# Patient Record
Sex: MALE | Race: WHITE | Employment: OTHER | URBAN - NONMETROPOLITAN AREA
[De-identification: names, ages, dates, MRNs, and addresses within clinical notes are randomized per-mention and may not be internally consistent; named-entity substitution may affect disease eponyms.]

---

## 2017-01-09 ENCOUNTER — AMBULATORY - GICH (OUTPATIENT)
Dept: UROLOGY | Facility: OTHER | Age: 82
End: 2017-01-09

## 2017-01-09 DIAGNOSIS — C61 MALIGNANT NEOPLASM OF PROSTATE (H): ICD-10-CM

## 2017-01-18 ENCOUNTER — COMMUNICATION - GICH (OUTPATIENT)
Dept: UROLOGY | Facility: OTHER | Age: 82
End: 2017-01-18

## 2018-01-03 NOTE — TELEPHONE ENCOUNTER
Patient Information     Patient Name MRN Maxime Lowry [id number] Male 10/26/1933      Telephone Encounter by Sima Santos at 2017 10:36 AM     Author:  Sima Santos Service:  (none) Author Type:  (none)     Filed:  2017 10:37 AM Encounter Date:  2017 Status:  Signed     :  Sima Santos            Pt was called by Urology Liaison to schedule a 6 month follow up with and PSA and see Dr. Norman. Pt declined the appointment stated he is seeing Dr. Pathak for follow up. Dr. Norman notified.Sima Santos LPN  2017  10:37 AM

## 2018-01-31 ENCOUNTER — DOCUMENTATION ONLY (OUTPATIENT)
Dept: FAMILY MEDICINE | Facility: OTHER | Age: 83
End: 2018-01-31

## 2018-01-31 PROBLEM — R35.0 URINARY FREQUENCY: Status: ACTIVE | Noted: 2018-01-31

## 2018-01-31 PROBLEM — R97.20 ELEVATED PROSTATE SPECIFIC ANTIGEN (PSA): Status: ACTIVE | Noted: 2018-01-31

## 2018-01-31 PROBLEM — G43.909 MIGRAINE HEADACHE: Status: ACTIVE | Noted: 2018-01-31

## 2018-01-31 PROBLEM — R35.1 NOCTURIA: Status: ACTIVE | Noted: 2018-01-31

## 2018-01-31 PROBLEM — M70.20 OLECRANON BURSITIS: Status: ACTIVE | Noted: 2018-01-31

## 2018-01-31 PROBLEM — R19.8 OTHER SYMPTOMS INVOLVING DIGESTIVE SYSTEM(787.99): Status: ACTIVE | Noted: 2018-01-31

## 2018-01-31 PROBLEM — R05.9 COUGH: Status: ACTIVE | Noted: 2018-01-31

## 2018-01-31 PROBLEM — M54.50 LUMBAGO: Status: ACTIVE | Noted: 2018-01-31

## 2018-01-31 PROBLEM — I25.10 CORONARY ATHEROSCLEROSIS: Status: ACTIVE | Noted: 2018-01-31

## 2018-01-31 PROBLEM — M25.519 PAIN IN JOINT, SHOULDER REGION: Status: ACTIVE | Noted: 2018-01-31

## 2018-01-31 RX ORDER — GLIPIZIDE 5 MG/1
5 TABLET ORAL
COMMUNITY
Start: 2014-10-02 | End: 2019-03-27

## 2018-01-31 RX ORDER — LISINOPRIL 20 MG/1
10 TABLET ORAL EVERY MORNING
COMMUNITY
Start: 2013-08-27

## 2018-01-31 RX ORDER — MULTIVIT-MIN/IRON FUM/FOLIC AC 7.5 MG-4
1 TABLET ORAL DAILY
COMMUNITY
Start: 2014-10-02 | End: 2019-03-27

## 2018-01-31 RX ORDER — TAMSULOSIN HYDROCHLORIDE 0.4 MG/1
0.8 CAPSULE ORAL AT BEDTIME
Status: ON HOLD | COMMUNITY
Start: 2014-10-02 | End: 2019-09-22

## 2018-01-31 RX ORDER — HYDROXYCHLOROQUINE SULFATE 200 MG/1
200 TABLET, FILM COATED ORAL EVERY MORNING
Status: ON HOLD | COMMUNITY
Start: 2013-08-27 | End: 2019-03-30

## 2018-01-31 RX ORDER — SIMVASTATIN 40 MG
20 TABLET ORAL AT BEDTIME
COMMUNITY
Start: 2013-08-27 | End: 2019-03-27

## 2018-01-31 RX ORDER — PREDNISONE 5 MG/1
7.5 TABLET ORAL
COMMUNITY
Start: 2015-07-30

## 2018-01-31 RX ORDER — ATENOLOL 50 MG/1
50 TABLET ORAL DAILY
COMMUNITY
Start: 2013-08-27 | End: 2019-03-27

## 2018-08-28 ENCOUNTER — RECORDS - HEALTHEAST (OUTPATIENT)
Dept: LAB | Facility: CLINIC | Age: 83
End: 2018-08-28

## 2018-08-28 LAB
ALBUMIN UR-MCNC: ABNORMAL MG/DL
APPEARANCE UR: ABNORMAL
BACTERIA #/AREA URNS HPF: ABNORMAL HPF
BILIRUB UR QL STRIP: NEGATIVE
COLOR UR AUTO: YELLOW
GLUCOSE UR STRIP-MCNC: NEGATIVE MG/DL
HGB UR QL STRIP: NEGATIVE
KETONES UR STRIP-MCNC: NEGATIVE MG/DL
LEUKOCYTE ESTERASE UR QL STRIP: ABNORMAL
NITRATE UR QL: NEGATIVE
PH UR STRIP: 6.5 [PH] (ref 4.5–8)
RBC #/AREA URNS AUTO: ABNORMAL HPF
SP GR UR STRIP: 1.02 (ref 1–1.03)
SQUAMOUS #/AREA URNS AUTO: ABNORMAL LPF
UROBILINOGEN UR STRIP-ACNC: ABNORMAL
WBC #/AREA URNS AUTO: >100 HPF
WBC CLUMPS #/AREA URNS HPF: PRESENT /[HPF]

## 2018-08-29 LAB — BACTERIA SPEC CULT: ABNORMAL

## 2018-09-11 ENCOUNTER — RECORDS - HEALTHEAST (OUTPATIENT)
Dept: LAB | Facility: CLINIC | Age: 83
End: 2018-09-11

## 2018-09-11 LAB
ALBUMIN SERPL-MCNC: 3 G/DL (ref 3.5–5)
ALP SERPL-CCNC: 51 U/L (ref 45–120)
ALT SERPL W P-5'-P-CCNC: 15 U/L (ref 0–45)
ANION GAP SERPL CALCULATED.3IONS-SCNC: 9 MMOL/L (ref 5–18)
AST SERPL W P-5'-P-CCNC: 16 U/L (ref 0–40)
BILIRUB SERPL-MCNC: 0.4 MG/DL (ref 0–1)
BUN SERPL-MCNC: 24 MG/DL (ref 8–28)
CALCIUM SERPL-MCNC: 9.3 MG/DL (ref 8.5–10.5)
CHLORIDE BLD-SCNC: 107 MMOL/L (ref 98–107)
CHOLEST SERPL-MCNC: 129 MG/DL
CO2 SERPL-SCNC: 24 MMOL/L (ref 22–31)
CREAT SERPL-MCNC: 0.95 MG/DL (ref 0.7–1.3)
ERYTHROCYTE [DISTWIDTH] IN BLOOD BY AUTOMATED COUNT: 14.7 % (ref 11–14.5)
FASTING STATUS PATIENT QL REPORTED: NORMAL
GFR SERPL CREATININE-BSD FRML MDRD: >60 ML/MIN/1.73M2
GLUCOSE BLD-MCNC: 168 MG/DL (ref 70–125)
HCT VFR BLD AUTO: 33.6 % (ref 40–54)
HDLC SERPL-MCNC: 50 MG/DL
HGB BLD-MCNC: 11.2 G/DL (ref 14–18)
LDLC SERPL CALC-MCNC: 61 MG/DL
MCH RBC QN AUTO: 33 PG (ref 27–34)
MCHC RBC AUTO-ENTMCNC: 33.3 G/DL (ref 32–36)
MCV RBC AUTO: 99 FL (ref 80–100)
PLATELET # BLD AUTO: 167 THOU/UL (ref 140–440)
PMV BLD AUTO: 10.2 FL (ref 8.5–12.5)
POTASSIUM BLD-SCNC: 4.1 MMOL/L (ref 3.5–5)
PROT SERPL-MCNC: 6 G/DL (ref 6–8)
RBC # BLD AUTO: 3.39 MILL/UL (ref 4.4–6.2)
SODIUM SERPL-SCNC: 140 MMOL/L (ref 136–145)
TRIGL SERPL-MCNC: 88 MG/DL
WBC: 8.8 THOU/UL (ref 4–11)

## 2018-09-12 LAB — HBA1C MFR BLD: 6.4 % (ref 4.2–6.1)

## 2018-10-02 ENCOUNTER — HOSPITAL ENCOUNTER (EMERGENCY)
Facility: CLINIC | Age: 83
Discharge: HOME | End: 2018-10-02
Attending: FAMILY MEDICINE | Admitting: FAMILY MEDICINE
Payer: MEDICARE

## 2018-10-02 VITALS
OXYGEN SATURATION: 98 % | DIASTOLIC BLOOD PRESSURE: 93 MMHG | RESPIRATION RATE: 16 BRPM | TEMPERATURE: 97.6 F | SYSTOLIC BLOOD PRESSURE: 147 MMHG

## 2018-10-02 DIAGNOSIS — N39.0 URINARY TRACT INFECTION ASSOCIATED WITH CATHETERIZATION OF URINARY TRACT, UNSPECIFIED INDWELLING URINARY CATHETER TYPE, INITIAL ENCOUNTER (H): ICD-10-CM

## 2018-10-02 DIAGNOSIS — R33.9 URINARY RETENTION: ICD-10-CM

## 2018-10-02 DIAGNOSIS — T83.511A URINARY TRACT INFECTION ASSOCIATED WITH CATHETERIZATION OF URINARY TRACT, UNSPECIFIED INDWELLING URINARY CATHETER TYPE, INITIAL ENCOUNTER (H): ICD-10-CM

## 2018-10-02 LAB
ALBUMIN UR-MCNC: 30 MG/DL
ANION GAP SERPL CALCULATED.3IONS-SCNC: 6 MMOL/L (ref 3–14)
APPEARANCE UR: ABNORMAL
BILIRUB UR QL STRIP: NEGATIVE
BUN SERPL-MCNC: 22 MG/DL (ref 7–30)
CALCIUM SERPL-MCNC: 8.2 MG/DL (ref 8.5–10.1)
CHLORIDE SERPL-SCNC: 108 MMOL/L (ref 94–109)
CO2 SERPL-SCNC: 28 MMOL/L (ref 20–32)
COLOR UR AUTO: YELLOW
CREAT SERPL-MCNC: 1 MG/DL (ref 0.66–1.25)
GFR SERPL CREATININE-BSD FRML MDRD: 71 ML/MIN/1.7M2
GLUCOSE SERPL-MCNC: 149 MG/DL (ref 70–99)
GLUCOSE UR STRIP-MCNC: NEGATIVE MG/DL
HGB UR QL STRIP: NEGATIVE
KETONES UR STRIP-MCNC: NEGATIVE MG/DL
LEUKOCYTE ESTERASE UR QL STRIP: ABNORMAL
NITRATE UR QL: NEGATIVE
PH UR STRIP: 6 PH (ref 5–7)
POTASSIUM SERPL-SCNC: 4.1 MMOL/L (ref 3.4–5.3)
RBC #/AREA URNS AUTO: 28 /HPF (ref 0–2)
SODIUM SERPL-SCNC: 142 MMOL/L (ref 133–144)
SOURCE: ABNORMAL
SP GR UR STRIP: 1.01 (ref 1–1.03)
UROBILINOGEN UR STRIP-MCNC: 0 MG/DL (ref 0–2)
WBC #/AREA URNS AUTO: >182 /HPF (ref 0–5)
WBC CLUMPS #/AREA URNS HPF: PRESENT /HPF

## 2018-10-02 PROCEDURE — 99284 EMERGENCY DEPT VISIT MOD MDM: CPT | Mod: 25 | Performed by: FAMILY MEDICINE

## 2018-10-02 PROCEDURE — 76705 ECHO EXAM OF ABDOMEN: CPT | Mod: 26 | Performed by: FAMILY MEDICINE

## 2018-10-02 PROCEDURE — 87088 URINE BACTERIA CULTURE: CPT | Performed by: FAMILY MEDICINE

## 2018-10-02 PROCEDURE — 87186 SC STD MICRODIL/AGAR DIL: CPT | Performed by: FAMILY MEDICINE

## 2018-10-02 PROCEDURE — 87086 URINE CULTURE/COLONY COUNT: CPT | Performed by: FAMILY MEDICINE

## 2018-10-02 PROCEDURE — 25000125 ZZHC RX 250: Performed by: FAMILY MEDICINE

## 2018-10-02 PROCEDURE — 80048 BASIC METABOLIC PNL TOTAL CA: CPT | Performed by: FAMILY MEDICINE

## 2018-10-02 PROCEDURE — 76705 ECHO EXAM OF ABDOMEN: CPT | Performed by: FAMILY MEDICINE

## 2018-10-02 PROCEDURE — 81001 URINALYSIS AUTO W/SCOPE: CPT | Performed by: FAMILY MEDICINE

## 2018-10-02 RX ORDER — SULFAMETHOXAZOLE/TRIMETHOPRIM 800-160 MG
1 TABLET ORAL 2 TIMES DAILY
Qty: 14 TABLET | Refills: 0 | Status: SHIPPED | OUTPATIENT
Start: 2018-10-02 | End: 2018-10-13

## 2018-10-02 RX ADMIN — LIDOCAINE HYDROCHLORIDE 10 ML: 20 JELLY TOPICAL at 15:32

## 2018-10-02 ASSESSMENT — ENCOUNTER SYMPTOMS
ABDOMINAL PAIN: 1
NAUSEA: 0
DIAPHORESIS: 0
BLOOD IN STOOL: 0
DIFFICULTY URINATING: 1
FEVER: 0
SINUS PRESSURE: 0
COUGH: 0
FREQUENCY: 0
WHEEZING: 0
PALPITATIONS: 0
VOMITING: 0
SHORTNESS OF BREATH: 0
DYSURIA: 1
HEADACHES: 0
DIARRHEA: 1
CONSTIPATION: 0
SORE THROAT: 0
CHILLS: 0

## 2018-10-02 NOTE — ED NOTES
Pt self cath's and reports it went normally this morning but this afternoon he tried to cath but it was very painful and he was unable to perform the procedure. He also reports that some urine leaked around the catheter rather than coming through it. Pt denies hematuria. Does have hx of UTI but reports this feels different

## 2018-10-02 NOTE — ED AVS SNAPSHOT
[facility] Emergency Department    [address] [address]    Phone:  [phone]    Fax:  [phone]                                       Maxime Powell   MRN: [id number]    Department:  [facility] Emergency Department   Date of Visit:  10/2/2018           After Visit Summary Signature Page     I have received my discharge instructions, and my questions have been answered. I have discussed any challenges I see with this plan with the nurse or doctor.    ..........................................................................................................................................  Patient/Patient Representative Signature      ..........................................................................................................................................  Patient Representative Print Name and Relationship to Patient    ..................................................               ................................................  Date                                   Time    ..........................................................................................................................................  Reviewed by Signature/Title    ...................................................              ..............................................  Date                                               Time          22EPIC Rev 08/18

## 2018-10-02 NOTE — ED PROVIDER NOTES
History     Chief Complaint   Patient presents with     Urinary Retention     does self cathing but too painful this afternoon to insert cath     HPI  Maxime Powell is a 84 year old male who presents with DM,RA, prostate cancer, CAD and urinary retention and self-catheterizes 5-6 times daily for the last year. Today it was difficult and could not place catheter - painful to catheterize today and leakage around catheter. this occurred this afternoon.   dysuria.    No fever,.  suprapubic paiun. Tolerating fluids, food.     Problem List:    Patient Active Problem List    Diagnosis Date Noted     Coronary atherosclerosis 01/31/2018     Priority: Medium     Cough 01/31/2018     Priority: Medium     Elevated prostate specific antigen (PSA) 01/31/2018     Priority: Medium     Lumbago 01/31/2018     Priority: Medium     Migraine headache 01/31/2018     Priority: Medium     Nocturia 01/31/2018     Priority: Medium     Olecranon bursitis 01/31/2018     Priority: Medium     Other symptoms involving digestive system(787.99) 01/31/2018     Priority: Medium     Pain in joint, shoulder region 01/31/2018     Priority: Medium     Urinary frequency 01/31/2018     Priority: Medium     Aftercare following surgery of the musculoskeletal system 11/04/2014     Priority: Medium     Acute urinary retention 05/07/2014     Priority: Medium     ASCVD (arteriosclerotic cardiovascular disease) 05/07/2014     Priority: Medium     Overview:   S/p RCA stenting 2001       Diabetes mellitus, type 2 (H) 05/07/2014     Priority: Medium     Hyperlipidemia 05/07/2014     Priority: Medium     Hypertension 05/07/2014     Priority: Medium     Rheumatoid arthritis (H) 05/07/2014     Priority: Medium     Osteoarthritis of glenohumeral joint 02/13/2014     Priority: Medium     Prostate cancer (H) 08/27/2013     Priority: Medium     Overview:   S/p radition tx 1987 in [place]           Past Medical History:    Past Medical History:   Diagnosis Date      Atherosclerotic heart disease of native coronary artery without angina pectoris      Essential (primary) hypertension      Hyperlipidemia      Mononeuropathy of left lower extremity      Osteoarthritis      Personal history of malignant neoplasm of prostate      Rheumatoid arthritis (H)      Strain of muscle, fascia and tendon of long head of biceps, unspecified arm, initial encounter      Type 2 diabetes mellitus without complications (H)        Past Surgical History:    Past Surgical History:   Procedure Laterality Date     APPENDECTOMY OPEN      1960     ARTHROSCOPY SHOULDER ROTATOR CUFF REPAIR      2002,right, Jim Chanel MD     ARTHROTOMY WRIST      1986     COLONOSCOPY      2014     HEART CATH, ANGIOPLASTY      2001,right coronary artery     OTHER SURGICAL HISTORY      05/07/2014,[id number],SHOULDER REPLACEMENT,Right,reverse     OTHER SURGICAL HISTORY      69545.0,NM BONE SCAN 3 PHASE,09/06/13 negative for  metastases     OTHER SURGICAL HISTORY      611334,OTHER,no evidence of metastases     OTHER SURGICAL HISTORY      911189,OTHER     OTHER SURGICAL HISTORY      738160,OTHER,right       Family History:    Family History   Problem Relation Age of Onset     Cancer Father      Cancer,throat cancer     Breast Cancer Sister      Cancer-breast       Social History:  Marital Status:   [2]  Social History   Substance Use Topics     Smoking status: Former Smoker     Years: 30.00     Types: Cigarettes     Quit date: 1/1/1980     Smokeless tobacco: Former User     Types: Chew     Quit date: 5/1/2014      Comment: Quit smoking: quit smoking 33 years ago. Chew's daily     Alcohol use No        Medications:      adalimumab (HUMIRA) 40 MG/0.8ML prefilled syringe kit   aspirin 81 MG tablet   atenolol (TENORMIN) 50 MG tablet   calcium-vitamin D (CALTRATE) 600-400 MG-UNIT per tablet   glipiZIDE (GLUCOTROL) 5 MG tablet   hydroxychloroquine (PLAQUENIL) 200 MG tablet   lisinopril (PRINIVIL/ZESTRIL) 20 MG tablet    metFORMIN (GLUCOPHAGE) 500 MG tablet   Multiple Vitamins-Minerals (MULTI-VITAMIN/MINERALS) TABS   predniSONE (DELTASONE) 5 MG tablet   simvastatin (ZOCOR) 40 MG tablet   tamsulosin (FLOMAX) 0.4 MG capsule         Review of Systems   Constitutional: Negative for chills, diaphoresis and fever.   HENT: Negative for ear pain, sinus pressure and sore throat.    Eyes: Negative for visual disturbance.   Respiratory: Negative for cough, shortness of breath and wheezing.    Cardiovascular: Negative for chest pain and palpitations.   Gastrointestinal: Positive for abdominal pain (suprapubic) and diarrhea (x1 episopde). Negative for blood in stool, constipation, nausea and vomiting.   Genitourinary: Positive for decreased urine volume, difficulty urinating and dysuria. Negative for frequency and urgency.   Skin: Negative for rash.   Neurological: Negative for headaches.   All other systems reviewed and are negative.      Physical Exam   BP: 127/62  Heart Rate: 70  Temp: 97.6  F (36.4  C)  Resp: 16  SpO2: 98 %      Physical Exam   Constitutional: No distress.   HENT:   Mouth/Throat: Oropharynx is clear and moist.   Eyes: Conjunctivae are normal.   Neck: Neck supple.   Cardiovascular: Exam reveals no gallop and no friction rub.    No murmur heard.  Pulmonary/Chest: Effort normal and breath sounds normal. No respiratory distress. He has no wheezes. He has no rales.   Abdominal: Soft. Bowel sounds are normal. He exhibits no distension. There is tenderness in the suprapubic area. There is no rebound and no guarding.   Musculoskeletal: He exhibits no edema.   Neurological: He is alert. He exhibits normal muscle tone.   Skin: No rash noted. He is not diaphoretic.       ED Course     ED Course     Procedures               Critical Care time:  none               Results for orders placed or performed during the hospital encounter of 10/02/18 (from the past 24 hour(s))   POC US ABDOMEN LIMITED    Impression    [facility]  Procedure Note      Limited Bedside ED Ultrasound of the Urinary Bladder:    PERFORMED BY: Dr. Joel Williamson  INDICATIONS:  Suprapubic pain  PROBE: Low frequency convex probe  BODY LOCATION:  Abdomen  FINDINGS:  Visualization of the bladder in longitudinal and transverse views demonstrated a distended state.  The bladder dimensions measured: 10 cm width X 8 height cm X 10 cm length.  INTERPRETATION:  Total calculated volume was estimated at 400 cc.  The bladder is abnormally distended.  IMAGE DOCUMENTATION: Images were archived to PACs system.   Urine Culture   Result Value Ref Range    Specimen Description Catheterized Urine     Special Requests Specimen received in preservative     Culture Micro PENDING    UA with Microscopic   Result Value Ref Range    Color Urine Yellow     Appearance Urine Cloudy     Glucose Urine Negative NEG^Negative mg/dL    Bilirubin Urine Negative NEG^Negative    Ketones Urine Negative NEG^Negative mg/dL    Specific Gravity Urine 1.013 1.003 - 1.035    Blood Urine Negative NEG^Negative    pH Urine 6.0 5.0 - 7.0 pH    Protein Albumin Urine 30 (A) NEG^Negative mg/dL    Urobilinogen mg/dL 0.0 0.0 - 2.0 mg/dL    Nitrite Urine Negative NEG^Negative    Leukocyte Esterase Urine Large (A) NEG^Negative    Source Catheterized Urine     WBC Urine >182 (H) 0 - 5 /HPF    RBC Urine 28 (H) 0 - 2 /HPF    WBC Clumps Present (A) NEG^Negative /HPF   Basic metabolic panel   Result Value Ref Range    Sodium 142 133 - 144 mmol/L    Potassium 4.1 3.4 - 5.3 mmol/L    Chloride 108 94 - 109 mmol/L    Carbon Dioxide 28 20 - 32 mmol/L    Anion Gap 6 3 - 14 mmol/L    Glucose 149 (H) 70 - 99 mg/dL    Urea Nitrogen 22 7 - 30 mg/dL    Creatinine 1.00 0.66 - 1.25 mg/dL    GFR Estimate 71 >60 mL/min/1.7m2    GFR Estimate If Black 86 >60 mL/min/1.7m2    Calcium 8.2 (L) 8.5 - 10.1 mg/dL       Medications - No data to display    Assessments & Plan (with Medical Decision Making)     MDM: Maxime Powell is a 84 year old  male who presented with urinary obstruction symptoms  and difficulty with self-catheterization today associated with dysuria and symptoms suggestive of urinary tract infection.  He has chronic urinary obstruction and self catheterizes for more than a year.  Prior prostate cancer resection and status post radiation.   bedside ultrasound revealed approximately 400 cc and bladder and he was catheterized with [place] catheter I recommended keeping is in for now.  As he has no BPH I did not prescribe Flomax.  He does have a urinary tract infection will treat this with Septra.  We did discuss the potential risk of hyperkalemia on Septra while he is on lisinopril and therefore I checked a baseline chemistry panel today and recommended that he follow-up in clinic and recheck potassium at that point.  He should also return for signs of worsening of an urinary tract infection including fever which were not present on his exam.  I did recommend he follow-up with urology regarding his obstructive symptoms for removal of the [place] catheter.  I suspect at that point after urinary tract infection is treated he will be able to tolerate removal of the catheter.    Alternative antibiotics were considered for catheter associated UTI.  He is on chronic steroids and therefore I have avoided floroquinolones.    I have reviewed the nursing notes.    I have reviewed the findings, diagnosis, plan and need for follow up with the patient.       New Prescriptions    No medications on file       Final diagnoses:   Urinary tract infection associated with catheterization of urinary tract, unspecified indwelling urinary catheter type, initial encounter (H) - Take septra ds twice daily for 7 days.  await the urine culture.  return for fever, worsening.  recheck potassium level in the next week to confirm no increased potassium on septra while on lisinopril.   Urinary retention - leave catheter in until follow-up with urology.  return for worsening.        10/2/2018   [facility] EMERGENCY DEPARTMENT     Joel Williamson MD  10/02/18 0921

## 2018-10-02 NOTE — ED AVS SNAPSHOT
[facility] Emergency Department    [address] [facility] [address]    Phone:  [phone]    Fax:  [phone]                                       Maxime Powell   MRN: [id number]    Department:  [facility] Emergency Department   Date of Visit:  10/2/2018           Patient Information     Date Of Birth          10/26/1933        Your diagnoses for this visit were:     Urinary tract infection associated with catheterization of urinary tract, unspecified indwelling urinary catheter type, initial encounter (H) Take septra ds twice daily for 7 days.  await the urine culture.  return for fever, worsening.  recheck potassium level in the next week to confirm no increased potassium on septra while on lisinopril.    Urinary retention leave catheter in until follow-up with urology.  return for worsening.       You were seen by Joel Williamson MD.      Follow-up Information     Follow up with [facility] In 1 week.    Contact information:    [facility] [address]          Follow up with [facility] Emergency Department.    Specialty:  EMERGENCY MEDICINE    Why:  As needed, If symptoms worsen    Contact information:    [facility] [id number] [phone]    Additional information:    The medical center is located at   [address] [place] [address] (between [address]   [address] in [place], [place] north   of [place]).        Schedule an appointment as soon as possible for a visit with [facility].    Specialty:  Urology    [facility]  [facility]    Contact information:    [address] [facility] [id number] [phone]    Additional information:    The [facility] is located at   [address] [place] [address] (between [address]   [address] in [place], four [place] north   of [place]).        Discharge Instructions         ICD-10-CM    1. Urinary tract infection associated with catheterization of urinary  tract, unspecified indwelling urinary catheter type, initial encounter (H) T83.511A     N39.0     Take septra ds twice daily for 7 days.  await the urine culture.  return for fever, worsening.  recheck potassium level in the next week to confirm no increased potassium on septra while on lisinopril.   2. Urinary retention R33.9     leave catheter in until follow-up with urology.  return for worsening.         Catheter-Associated Urinary Tract Infections     A small balloon keeps the catheter in place inside the bladder.   A catheter-associated urinary tract infection (CAUTI) is an infection of the urinary system. CAUTI is caused by bacteria that enter the urinary tract when a urinary catheter is used. This is a tube that s placed into the bladder to drain urine.  The urinary system  This system includes the kidneys, ureters, bladder, and urethra. The kidneys filter blood and make urine. The ureters carry urine from the kidneys to the bladder. The bladder stores urine. The urethra carries urine from the bladder to the outside of the body.  What is a urinary catheter?  A urinary catheter is a thin, flexible tube. It is placed in the bladder to drain urine. Urine flows through the tube into a collecting bag outside of the body. There are different types of urinary catheters. The most common type is an indwelling catheter. This is also known as a urethral catheter. This is because it s placed into the bladder through the urethra. This catheter is also called a Murcia catheter.  Why is a urinary catheter needed?  A urinary catheter is needed for any of the following:    You can t get up to use the toilet because your mobility is limited. This may be due to surgery, an injury, or illness.    You have a blockage in your urinary system.    Your healthcare provider needs to measure the amount of urine you pass.    The function of your kidneys and bladder is being tested.    You re not able to control your bladder  (incontinence).  In most cases, the urinary catheter is temporary. You'll need it only until the problem that requires it is resolved.   How does a CAUTI develop?  Bacteria can enter the urinary tract as the catheter is put into the urethra. Bacteria can also get into the urinary tract while the catheter is in place. The common bacteria that cause a CAUTI are ones that live in the intestine. These bacteria don t normally cause problems in the intestine. But when they get into the urinary tract, a CAUTI can result.  Why is a CAUTI of concern?  Left untreated, a CAUTI can lead to health problems. These problems may include bladder infection, prostate infection, and kidney infection. A CAUTI can prolong your hospital stay. If the infection is not treated in time, serious health complications may occur.  What are the symptoms of a CAUTI?    A burning feeling, pressure, or pain in your lower abdomen    Fever or chills    Urine in the collecting bag is cloudy or bloody (pink or red)    Burning feeling in the urethra or genital area    Aching in the back (kidney area)    Nausea and vomiting    Person is confused, or is not alert, or has a change in behavior (mainly affects older patients)    Note that sometimes a person won t have any symptoms but may still have a CAUTI.  Tell a healthcare provider right away if you or your loved one has any of these symptoms.  How is CAUTI diagnosed?  If you have symptoms of CAUTI your healthcare provider will order tests. These include a urine test, blood tests, and other tests as needed.  How is CAUTI treated?   Treatment may involve any of the following:    Antibiotics. Your healthcare provider will likely prescribe antibiotics if you have symptoms. Be aware that if you don t have symptoms, you may not be given antibiotics. This is to prevent an increase in bacteria that resist (can t be killed by) certain antibiotics.    Removing the catheter. The catheter will be removed when your  healthcare provider decides it s no longer needed. This usually helps stop the infection.    Changing the catheter. If you still need a catheter, the old one will be removed. A new one will be put in. This may help stop the infection.  How do hospital and long-term facility staff prevent CAUTI?  To keep patients from getting a CAUTI, the staff follow certain procedures:    Prescribe a catheter only when it s needed. It is removed as soon as it s no longer needed.    Use sterile (clean) technique when placing the catheter into the urinary tract. This means before putting the catheter in, the caregiver washes his or her hands with soap and water. He or she then puts on sterile gloves. A sterile catheter kit that has cleansers is used to cleanse the patient s genital area.    Before performing catheter care, caregivers also wash their hands or use an alcohol-based hand cleanser.    Hang the bag lower than your bladder. This prevents urine from flowing back into your bladder.    Ensure that the bag is emptied regularly.  What you can do as a patient to prevent CAUTI  You can help prevent yourself from getting a CAUTI by doing the following:    Every day ask your healthcare provider how long you need to have the catheter. The longer you have a catheter, the higher your chance of getting a CAUTI.    If a caregiver doesn t clean his or her hands and put on gloves before touching your catheter, ask them to do so.    If you ve been taught how to care for your catheter, be sure to wash your hands before and after each session.    Make sure your bag is lower than your bladder. If it s not, tell your caregiver.    Don t disconnect the catheter and drain tube. Doing so allows germs to get into the catheter.    Cleansing of the genital and perineal areas is very important to help decrease bacteria in areas surrounding the catheter. Ask your doctor what you should use and how often to clean these areas.  If you are discharged with  an indwelling catheter    Before you leave the hospital, make sure you understand the instructions on how to care for your catheter at home.    Ask your healthcare provider how long you need the catheter. Also ask if you need to make a follow-up appointment to have the catheter removed.    Always use sterile (clean) technique when caring for your catheter. Wash your hands before and after doing any catheter care.    Call your healthcare provider right away if you develop symptoms of a CAUTI (see above).   Date Last Reviewed: 1/1/2017 2000-2017 [employer]. [address] [place] [address]. All rights reserved. This information is not intended as a substitute for professional medical care. Always follow your healthcare professional's instructions.          24 Hour Appointment Hotline       To make an appointment at any [facility], call [phone] ([phone]). If you don't have a family doctor or clinic, we will help you find one. [facility] clinics are conveniently located to serve the needs of you and your family.             Review of your medicines      START taking        Dose / Directions Last dose taken    sulfamethoxazole-trimethoprim 800-160 MG per tablet   Commonly known as:  BACTRIM DS/SEPTRA DS   Dose:  1 tablet   Quantity:  14 tablet        Take 1 tablet by mouth 2 times daily   Refills:  0          Our records show that you are taking the medicines listed below. If these are incorrect, please call your family doctor or clinic.        Dose / Directions Last dose taken    aspirin 81 MG tablet   Dose:  81 mg        Take 81 mg by mouth daily with food   Refills:  0        atenolol 50 MG tablet   Commonly known as:  TENORMIN   Dose:  50 mg        Take 50 mg by mouth daily   Refills:  0        calcium carbonate 600 mg-vitamin D 400 units 600-400 MG-UNIT per tablet   Commonly known as:  CALTRATE   Dose:  1 tablet        Take 1 tablet by mouth 2 times daily (with meals)    Refills:  0        glipiZIDE 5 MG tablet   Commonly known as:  GLUCOTROL   Dose:  5 mg        Take 5 mg by mouth 2 times daily (before meals)   Refills:  0        HUMIRA 40 MG/0.8ML prefilled syringe kit   Dose:  40 mg   Generic drug:  adalimumab        Inject 40 mg Subcutaneous every 14 days   Refills:  0        hydroxychloroquine 200 MG tablet   Commonly known as:  PLAQUENIL        Take by mouth 2 times daily   Refills:  0        lisinopril 20 MG tablet   Commonly known as:  PRINIVIL/ZESTRIL   Dose:  10 mg        Take 10 mg by mouth daily   Refills:  0        metFORMIN 500 MG tablet   Commonly known as:  GLUCOPHAGE        Take two tablets in the AM and one tablet at night   Refills:  0        MULTI-VITAMIN/MINERALS Tabs   Dose:  1 tablet        Take 1 tablet by mouth daily   Refills:  0        predniSONE 5 MG tablet   Commonly known as:  DELTASONE   Dose:  7.5 mg        Take 7.5 mg by mouth as directed.   Refills:  0        simvastatin 40 MG tablet   Commonly known as:  ZOCOR   Dose:  20 mg        Take 20 mg by mouth At Bedtime   Refills:  0        tamsulosin 0.4 MG capsule   Commonly known as:  FLOMAX   Dose:  0.4 mg        Take 0.4 mg by mouth daily after a meal.   Refills:  0                Prescriptions were sent or printed at these locations (1 Prescription)                   [facility] [place], [place] - [address] [place] [address]    Telephone:  [phone]   Fax:  [phone]   Hours:                  E-Prescribed (1 of 1)         sulfamethoxazole-trimethoprim (BACTRIM DS/SEPTRA DS) 800-160 MG per tablet                Procedures and tests performed during your visit     Basic metabolic panel    Leg bag    POC US ABDOMEN LIMITED    UA with Microscopic    Urine Culture      Orders Needing Specimen Collection     None      Pending Results     Date and Time Order Name Status Description    10/2/2018 1701 Basic metabolic panel In process     10/2/2018 1518 Urine  Culture In process             Pending Culture Results     Date and Time Order Name Status Description    10/2/2018 1518 Urine Culture In process             Pending Results Instructions     If you had any lab results that were not finalized at the time of your Discharge, you can call the ED Lab Result RN at [phone]. You will be contacted by this team for any positive Lab results or changes in treatment. The nurses are available 7 days a week from 10A to 6:30P.  You can leave a message 24 hours per day and they will return your call.        Test Results From Your Hospital Stay        10/2/2018  3:21 PM      Impression     [facility] Procedure Note      Limited Bedside ED Ultrasound of the Urinary Bladder:    PERFORMED BY: Dr. Joel Williamson  INDICATIONS:  Suprapubic pain  PROBE: Low frequency convex probe  BODY LOCATION:  Abdomen  FINDINGS:  Visualization of the bladder in longitudinal and transverse views demonstrated a distended state.  The bladder dimensions measured: 10 cm width X 8 height cm X 10 cm length.  INTERPRETATION:  Total calculated volume was estimated at 400 cc.  The bladder is abnormally distended.  IMAGE DOCUMENTATION: Images were archived to PACs system.         10/2/2018  4:00 PM         10/2/2018  4:27 PM      Component Results     Component Value Ref Range & Units Status    Color Urine Yellow  Final    Appearance Urine Cloudy  Final    Glucose Urine Negative NEG^Negative mg/dL Final    Bilirubin Urine Negative NEG^Negative Final    Ketones Urine Negative NEG^Negative mg/dL Final    Specific Gravity Urine 1.013 1.003 - 1.035 Final    Blood Urine Negative NEG^Negative Final    pH Urine 6.0 5.0 - 7.0 pH Final    Protein Albumin Urine 30 (A) NEG^Negative mg/dL Final    Urobilinogen mg/dL 0.0 0.0 - 2.0 mg/dL Final    Nitrite Urine Negative NEG^Negative Final    Leukocyte Esterase Urine Large (A) NEG^Negative Final    Source Catheterized Urine  Final    WBC Urine >182 (H) 0 - 5 /HPF Final  "   RBC Urine 28 (H) 0 - 2 /HPF Final    WBC Clumps Present (A) NEG^Negative /HPF Final         10/2/2018  5:31 PM                Thank you for choosing [facility]       Thank you for choosing [facility] for your care. Our goal is always to provide you with excellent care. Hearing back from our patients is one way we can continue to improve our services. Please take a few minutes to complete the written survey that you may receive in the mail after you visit with us. Thank you!        [employer] Accesshart Information     [facility] lets you send messages to your doctor, view your test results, renew your prescriptions, schedule appointments and more. To sign up, go to [email or web address] . Click on \"Log in\" on the left side of the screen, which will take you to the Welcome page. Then click on \"Sign up Now\" on the right side of the page.     You will be asked to enter the access code listed below, as well as some personal information. Please follow the directions to create your username and password.     Your access code is: [email or web address]  Expires: 2018  5:57 PM     Your access code will  in 90 days. If you need help or a new code, please call your [facility] clinic or [phone].        Care EveryWhere ID     This is your Care EveryWhere ID. This could be used by other organizations to access your [facility] medical records  [id number]        Equal Access to Services     RAYRAY DREW : Hadii thomas Henning, waaxda patrick, qaybta kaalthierry shields, geovanni wilkinson . So [facility] [place] [phone].    ATENCIÓN: Si habla español, tiene a nicholas disposición servicios gratuitos de asistencia lingüística. Su al [phone].    We comply with applicable federal civil rights laws and [place] laws. We do not discriminate on the basis of race, color, national origin, age, disability, sex, sexual orientation, or gender identity.            After Visit Summary       This is your record. Keep this with you " and show to your community pharmacist(s) and doctor(s) at your next visit.

## 2018-10-02 NOTE — DISCHARGE INSTRUCTIONS
ICD-10-CM    1. Urinary tract infection associated with catheterization of urinary tract, unspecified indwelling urinary catheter type, initial encounter (H) T83.511A     N39.0     Take septra ds twice daily for 7 days.  await the urine culture.  return for fever, worsening.  recheck potassium level in the next week to confirm no increased potassium on septra while on lisinopril.   2. Urinary retention R33.9     leave catheter in until follow-up with urology.  return for worsening.         Catheter-Associated Urinary Tract Infections     A small balloon keeps the catheter in place inside the bladder.   A catheter-associated urinary tract infection (CAUTI) is an infection of the urinary system. CAUTI is caused by bacteria that enter the urinary tract when a urinary catheter is used. This is a tube that s placed into the bladder to drain urine.  The urinary system  This system includes the kidneys, ureters, bladder, and urethra. The kidneys filter blood and make urine. The ureters carry urine from the kidneys to the bladder. The bladder stores urine. The urethra carries urine from the bladder to the outside of the body.  What is a urinary catheter?  A urinary catheter is a thin, flexible tube. It is placed in the bladder to drain urine. Urine flows through the tube into a collecting bag outside of the body. There are different types of urinary catheters. The most common type is an indwelling catheter. This is also known as a urethral catheter. This is because it s placed into the bladder through the urethra. This catheter is also called a Murcia catheter.  Why is a urinary catheter needed?  A urinary catheter is needed for any of the following:    You can t get up to use the toilet because your mobility is limited. This may be due to surgery, an injury, or illness.    You have a blockage in your urinary system.    Your healthcare provider needs to measure the amount of urine you pass.    The function of your kidneys  and bladder is being tested.    You re not able to control your bladder (incontinence).  In most cases, the urinary catheter is temporary. You'll need it only until the problem that requires it is resolved.   How does a CAUTI develop?  Bacteria can enter the urinary tract as the catheter is put into the urethra. Bacteria can also get into the urinary tract while the catheter is in place. The common bacteria that cause a CAUTI are ones that live in the intestine. These bacteria don t normally cause problems in the intestine. But when they get into the urinary tract, a CAUTI can result.  Why is a CAUTI of concern?  Left untreated, a CAUTI can lead to health problems. These problems may include bladder infection, prostate infection, and kidney infection. A CAUTI can prolong your hospital stay. If the infection is not treated in time, serious health complications may occur.  What are the symptoms of a CAUTI?    A burning feeling, pressure, or pain in your lower abdomen    Fever or chills    Urine in the collecting bag is cloudy or bloody (pink or red)    Burning feeling in the urethra or genital area    Aching in the back (kidney area)    Nausea and vomiting    Person is confused, or is not alert, or has a change in behavior (mainly affects older patients)    Note that sometimes a person won t have any symptoms but may still have a CAUTI.  Tell a healthcare provider right away if you or your loved one has any of these symptoms.  How is CAUTI diagnosed?  If you have symptoms of CAUTI your healthcare provider will order tests. These include a urine test, blood tests, and other tests as needed.  How is CAUTI treated?   Treatment may involve any of the following:    Antibiotics. Your healthcare provider will likely prescribe antibiotics if you have symptoms. Be aware that if you don t have symptoms, you may not be given antibiotics. This is to prevent an increase in bacteria that resist (can t be killed by) certain  antibiotics.    Removing the catheter. The catheter will be removed when your healthcare provider decides it s no longer needed. This usually helps stop the infection.    Changing the catheter. If you still need a catheter, the old one will be removed. A new one will be put in. This may help stop the infection.  How do hospital and long-term facility staff prevent CAUTI?  To keep patients from getting a CAUTI, the staff follow certain procedures:    Prescribe a catheter only when it s needed. It is removed as soon as it s no longer needed.    Use sterile (clean) technique when placing the catheter into the urinary tract. This means before putting the catheter in, the caregiver washes his or her hands with soap and water. He or she then puts on sterile gloves. A sterile catheter kit that has cleansers is used to cleanse the patient s genital area.    Before performing catheter care, caregivers also wash their hands or use an alcohol-based hand cleanser.    Hang the bag lower than your bladder. This prevents urine from flowing back into your bladder.    Ensure that the bag is emptied regularly.  What you can do as a patient to prevent CAUTI  You can help prevent yourself from getting a CAUTI by doing the following:    Every day ask your healthcare provider how long you need to have the catheter. The longer you have a catheter, the higher your chance of getting a CAUTI.    If a caregiver doesn t clean his or her hands and put on gloves before touching your catheter, ask them to do so.    If you ve been taught how to care for your catheter, be sure to wash your hands before and after each session.    Make sure your bag is lower than your bladder. If it s not, tell your caregiver.    Don t disconnect the catheter and drain tube. Doing so allows germs to get into the catheter.    Cleansing of the genital and perineal areas is very important to help decrease bacteria in areas surrounding the catheter. Ask your doctor what  you should use and how often to clean these areas.  If you are discharged with an indwelling catheter    Before you leave the hospital, make sure you understand the instructions on how to care for your catheter at home.    Ask your healthcare provider how long you need the catheter. Also ask if you need to make a follow-up appointment to have the catheter removed.    Always use sterile (clean) technique when caring for your catheter. Wash your hands before and after doing any catheter care.    Call your healthcare provider right away if you develop symptoms of a CAUTI (see above).   Date Last Reviewed: 1/1/2017 2000-2017 [employer]. [address] [place] [address], [place] [address]. All rights reserved. This information is not intended as a substitute for professional medical care. Always follow your healthcare professional's instructions.

## 2018-10-04 ENCOUNTER — TELEPHONE (OUTPATIENT)
Dept: EMERGENCY MEDICINE | Facility: CLINIC | Age: 83
End: 2018-10-04

## 2018-10-04 DIAGNOSIS — N39.0 URINARY TRACT INFECTION: ICD-10-CM

## 2018-10-04 LAB
BACTERIA SPEC CULT: ABNORMAL
Lab: ABNORMAL
SPECIMEN SOURCE: ABNORMAL

## 2018-10-04 NOTE — LETTER
October 6, 2018        Maxime Powell     [facility] [address]          Dear Maxime Powell:    You were seen in the [facility] at [facility] DEPARTMENT on 10/2/2018.  We are unable to reach you by phone, so we are sending you this letter.     It is important that you call [facility] Emergency Department Lab Result RN at [phone] as we have to make some changes in your treatment.     Best time to call back is between 10 a.m. and 6 p.m.      Sincerely,     [facility] Emergency Department Lab Result RN  [phone]

## 2018-10-04 NOTE — TELEPHONE ENCOUNTER
[facility] Emergency Department Lab result notification [Adult-Male]     [facility] ED lab result protocol used  Urine Culture     Reason for call  Notify of lab results, assess symptoms,  review ED providers recommendations/discharge instructions (if necessary) and advise per ED lab result f/u protocol     Lab Result (including Rx patient on, if applicable)  Final Urine Culture Report on 10/4/18  Emergency Dept discharge antibiotic prescribed: Sulfamethoxazole-Trimethoprim (Bactrim DS, Septra DS) 800-160 mg PO tablet,  1 tablet by mouth 2 times daily for 7 days.  #1. Bacteria, >100,000 colonies/mLEnterococcus faecalis,  is [NOT TESTED] to antibiotic.   Change in treatment as per [place] ED Lab result protocol.     Information table from ED Provider visit on 10/2/18  Symptoms reported at ED visit (Chief complaint, HPI)      Patient presents with     Urinary Retention         does self cathing but too painful this afternoon to insert cath       HPI  Maxime Powell is a 84 year old male who presents with DM,RA, prostate cancer, CAD and urinary retention and self-catheterizes 5-6 times daily for the last year. Today it was difficult and could not place catheter - painful to catheterize today and leakage around catheter. this occurred this afternoon.   dysuria.    No fever,.  suprapubic paiun. Tolerating fluids, food.       Significant Medical hx, if applicable (i.e. CKD, diabetes) Diabetes, prostate CA, martinez.   Allergies       Allergies   Allergen Reactions     Finasteride         Other reaction(s): Gynecomastia  Required tamoxifen treatment     Penicillins         Other reaction(s): Edema, Erythema     Gold Rash       Injectable gold       Weight, if applicable     Wt Readings from Last 2 Encounters:   08/19/16 77.1 kg (170 lb)   08/19/15 87.1 kg (192 lb)       Coumadin/Warfarin [Yes /No] No   Creatinine Level (mg/dl)       Creatinine   Date Value Ref Range Status   10/02/2018 1.00 0.66 - 1.25 mg/dL Final        Creatinine clearance (ml/min), if applicable Creatinine clearance cannot be calculated (Unknown ideal weight.)   ED providers Impression and Plan (applicable information) MDM: Maxime Powell is a 84 year old male who presented with urinary obstruction symptoms  and difficulty with self-catheterization today associated with dysuria and symptoms suggestive of urinary tract infection.  He has chronic urinary obstruction and self catheterizes for more than a year.  Prior prostate cancer resection and status post radiation.   bedside ultrasound revealed approximately 400 cc and bladder and he was catheterized with [place] catheter I recommended keeping is in for now.  As he has no BPH I did not prescribe Flomax.  He does have a urinary tract infection will treat this with Septra.  We did discuss the potential risk of hyperkalemia on Septra while he is on lisinopril and therefore I checked a baseline chemistry panel today and recommended that he follow-up in clinic and recheck potassium at that point.  He should also return for signs of worsening of an urinary tract infection including fever which were not present on his exam.  I did recommend he follow-up with urology regarding his obstructive symptoms for removal of the Murcia catheter.  I suspect at that point after urinary tract infection is treated he will be able to tolerate removal of the catheter.  Alternative antibiotics were considered for catheter associated UTI.  He is on chronic steroids and therefore I have avoided floroquinolones.     Take septra ds twice daily for 7 days.  await the urine culture.  return for fever, worsening.  recheck potassium level in the next week to confirm no increased potassium on septra while on lisinopril.  leave catheter in until follow-up with urology.  return for worsening.      ED diagnosis Urinary tract infection associated with catheterization of urinary tract, unspecified indwelling urinary catheter type, initial  encounter (H)    Urinary retention        ED provider Joel Williamson MD  10/02/18 2079       RN Assessment (Patient s current Symptoms), include time called.  [Insert Left message here if message left]  3:39 pm Message left to call us back at [phone], between 10 am and 6 pm, seven days a week. May leave a message 24/7, if no one available.      [facility] Emergency Department Provider Name & Recommendations (included time consulted)  3:25 pm I consulted with Dr Feliciano from the [place] ED. He advised to switch the Bactrim DS for Nitrofurantoin 100 mg BID for 7 days.   Patient to be notified.         PCP follow-up Questions asked: No     Dary Munroe RN  [facility] RN  Lung Nodule and ED Lab Result F/u RN  Epic pool (ED late result f/u RN): [id number]

## 2018-10-04 NOTE — TELEPHONE ENCOUNTER
[facility] Emergency Department Lab result notification [Adult-Male]    [facility] ED lab result protocol used  Urine Culture    Reason for call  Notify of lab results, assess symptoms,  review ED providers recommendations/discharge instructions (if necessary) and advise per ED lab result f/u protocol    Lab Result (including Rx patient on, if applicable)  Final Urine Culture Report on 10/4/18  Emergency Dept discharge antibiotic prescribed: Sulfamethoxazole-Trimethoprim (Bactrim DS, Septra DS) 800-160 mg PO tablet,  1 tablet by mouth 2 times daily for 7 days.  #1. Bacteria,  >100,000 colonies/mLEnterococcus faecalis,  is [NOT TESTED] to antibiotic.   Change in treatment as per [place] ED Lab result protocol.    Information table from ED Provider visit on 10/2/18  Symptoms reported at ED visit (Chief complaint, HPI) Patient presents with     Urinary Retention       does self cathing but too painful this afternoon to insert cath      HPI  Maxime Powell is a 84 year old male who presents with DM,RA, prostate cancer, CAD and urinary retention and self-catheterizes 5-6 times daily for the last year. Today it was difficult and could not place catheter - painful to catheterize today and leakage around catheter. this occurred this afternoon.   dysuria.    No fever,.  suprapubic paiun. Tolerating fluids, food.      Significant Medical hx, if applicable (i.e. CKD, diabetes) Diabetes, prostate CA, martinez.   Allergies Allergies   Allergen Reactions     Finasteride      Other reaction(s): Gynecomastia  Required tamoxifen treatment     Penicillins      Other reaction(s): Edema, Erythema     Gold Rash     Injectable gold      Weight, if applicable Wt Readings from Last 2 Encounters:   08/19/16 77.1 kg (170 lb)   08/19/15 87.1 kg (192 lb)      Coumadin/Warfarin [Yes /No] No   Creatinine Level (mg/dl) Creatinine   Date Value Ref Range Status   10/02/2018 1.00 0.66 - 1.25 mg/dL Final      Creatinine clearance (ml/min), if  applicable Creatinine clearance cannot be calculated (Unknown ideal weight.)   ED providers Impression and Plan (applicable information) MDM: Maxime Powell is a 84 year old male who presented with urinary obstruction symptoms  and difficulty with self-catheterization today associated with dysuria and symptoms suggestive of urinary tract infection.  He has chronic urinary obstruction and self catheterizes for more than a year.  Prior prostate cancer resection and status post radiation.   bedside ultrasound revealed approximately 400 cc and bladder and he was catheterized with Murcia catheter I recommended keeping is in for now.  As he has no BPH I did not prescribe Flomax.  He does have a urinary tract infection will treat this with Septra.  We did discuss the potential risk of hyperkalemia on Septra while he is on lisinopril and therefore I checked a baseline chemistry panel today and recommended that he follow-up in clinic and recheck potassium at that point.  He should also return for signs of worsening of an urinary tract infection including fever which were not present on his exam.  I did recommend he follow-up with urology regarding his obstructive symptoms for removal of the Murcia catheter.  I suspect at that point after urinary tract infection is treated he will be able to tolerate removal of the catheter.  Alternative antibiotics were considered for catheter associated UTI.  He is on chronic steroids and therefore I have avoided floroquinolones.    Take septra ds twice daily for 7 days.  await the urine culture.  return for fever, worsening.  recheck potassium level in the next week to confirm no increased potassium on septra while on lisinopril.  leave catheter in until follow-up with urology.  return for worsening.     ED diagnosis Urinary tract infection associated with catheterization of urinary tract, unspecified indwelling urinary catheter type, initial encounter (H)    Urinary retention       ED  provider Joel Williamson MD  10/02/18 6712      RN Assessment (Patient s current Symptoms), include time called.  [Insert Left message here if message left]  3:39 pm     RN Recommendations/Instructions per [facility] ED lab result protocol  Patient notified of lab result and treatment recommendations.  Rx for  sent to [Pharmacy - ].  RN reviewed information about Advised to finish full course of antibiotics as prescribed and drink plenty of fluids.  And follow up with your PCP as the ED provider recommended.     [facility] Emergency Department Provider Name & Recommendations (included time consulted)  2:55 pm I consulted with Dr Feliciano from the [place] ED. He advised to switch antibiotic for Nitrofurantoin 100 mg BID for 7 days.      Please Contact your PCP clinic or return to the Emergency department if your:    Symptoms return.    Symptoms do not improve after 3 days on antibiotic.    Symptoms do not resolve after completing antibiotic.    Symptoms worsen or other concerning symptom's.    PCP follow-up Questions asked: YES       Dary Munroe RN  [facility] RN  Lung Nodule and ED Lab Result F/u RN  Epic pool (ED late result f/u RN): [id number]  # [phone]

## 2018-10-05 NOTE — TELEPHONE ENCOUNTER
[facility] Emergency Department Lab result notification:    Reason for call  Notify of lab results, assess symptoms,  review ED providers recommendations (if necessary) and advise per ED lab result f/u protocol.    Lab result  Final Urine Culture Report on 10/4/18  Emergency Dept discharge antibiotic prescribed: Sulfamethoxazole-Trimethoprim (Bactrim DS, Septra DS) 800-160 mg PO tablet,  1 tablet by mouth 2 times daily for 7 days.  #1. Bacteria, >100,000 colonies/mLEnterococcus faecalis,  is [NOT TESTED] to antibiotic.   Change in treatment as per [facility] ED Lab result protocol.    Left voicemail message requesting a call back to [phone] between 10 a.m. and 6:30 p.m. for patient's ED/UC lab results.    PCP follow-up Questions asked: NO    Mercedes Grider RN    [facility] RN  Lung Nodule and ED Lab Results F/U RN  Epic pool (ED late result f/u RN) : [id number]  Ph # [phone]

## 2018-10-10 ENCOUNTER — RECORDS - HEALTHEAST (OUTPATIENT)
Dept: LAB | Facility: CLINIC | Age: 83
End: 2018-10-10

## 2018-10-10 LAB — POTASSIUM BLD-SCNC: 4.3 MMOL/L (ref 3.5–5)

## 2018-10-11 ENCOUNTER — RECORDS - HEALTHEAST (OUTPATIENT)
Dept: LAB | Facility: CLINIC | Age: 83
End: 2018-10-11

## 2018-10-11 LAB — POTASSIUM BLD-SCNC: 4.2 MMOL/L (ref 3.5–5)

## 2018-10-13 RX ORDER — NITROFURANTOIN 25; 75 MG/1; MG/1
100 CAPSULE ORAL 2 TIMES DAILY
Qty: 14 CAPSULE | Refills: 0 | Status: SHIPPED | OUTPATIENT
Start: 2018-10-13 | End: 2018-10-14

## 2018-10-13 NOTE — TELEPHONE ENCOUNTER
[facility] Emergency Department Lab result notification     Patient/parent Name  Dtr in law of Maxime Powell RN Assessment (Patient s current Symptoms), include time called.  [Insert Left message here if message left]  Dtr in law states letter received, patient resides in memory care unit at [facility], where his cares and meds are managed.  Dtr in law does offer no improvement in Maxime's confusion which is a symptom of UTI for him.  Spoke briefly with staff who reports nurse not in facility over weekend, asked this nurse to call back in 10 minutes and she can offer contact number for on call nurse.  Did call back, no answer.  Called back again, spoke with FIDENCIO Munoz for facility on call.  Medication ordered to [facility] and states no need for results be faxed to facility.      Please Contact your PCP clinic or return to the Emergency department if your:    Symptoms return.    Symptoms do not improve after 3 days on antibiotic.    Symptoms do not resolve after completing antibiotic.    Symptoms worsen or other concerning symptom's.    PCP follow-up Questions asked: YES       Mercedes Grider RN    [facility] RN  Lung Nodule and ED Lab Results F/U RN  Epic pool (ED late result f/u RN) : P [id number]   # [phone]

## 2018-10-14 RX ORDER — NITROFURANTOIN 25; 75 MG/1; MG/1
100 CAPSULE ORAL 2 TIMES DAILY
Qty: 14 CAPSULE | Refills: 0 | Status: SHIPPED | OUTPATIENT
Start: 2018-10-14 | End: 2019-03-27

## 2018-10-14 NOTE — TELEPHONE ENCOUNTER
Daughter in law Beatris, called with concern that Rx has not been received as yet.  Rx sent to the incorrect pharmacy.  Resent to [facility], Esdras Quinones, RN  [facility] RN  Lung Nodule and ED Lab Result RN  Epic pool (ED late result f/u RN): P [id number]  [facility] INCIDENTAL RADIOLOGY F/U NURSES: P [id number]  # [phone]

## 2018-12-25 ENCOUNTER — HOSPITAL ENCOUNTER (EMERGENCY)
Facility: CLINIC | Age: 83
Discharge: HOME | End: 2018-12-25
Attending: FAMILY MEDICINE | Admitting: FAMILY MEDICINE
Payer: MEDICARE

## 2018-12-25 VITALS
TEMPERATURE: 97.2 F | SYSTOLIC BLOOD PRESSURE: 135 MMHG | DIASTOLIC BLOOD PRESSURE: 100 MMHG | RESPIRATION RATE: 16 BRPM | BODY MASS INDEX: 23.03 KG/M2 | OXYGEN SATURATION: 96 % | HEART RATE: 87 BPM | HEIGHT: 72 IN | WEIGHT: 170 LBS

## 2018-12-25 DIAGNOSIS — R33.9 URINE RETENTION: ICD-10-CM

## 2018-12-25 DIAGNOSIS — N30.00 ACUTE CYSTITIS WITHOUT HEMATURIA: ICD-10-CM

## 2018-12-25 LAB
ALBUMIN UR-MCNC: 100 MG/DL
ANION GAP SERPL CALCULATED.3IONS-SCNC: 9 MMOL/L (ref 3–14)
APPEARANCE UR: ABNORMAL
BACTERIA #/AREA URNS HPF: ABNORMAL /HPF
BASOPHILS # BLD AUTO: 0 10E9/L (ref 0–0.2)
BASOPHILS NFR BLD AUTO: 0.2 %
BILIRUB UR QL STRIP: NEGATIVE
BUN SERPL-MCNC: 20 MG/DL (ref 7–30)
CALCIUM SERPL-MCNC: 8.3 MG/DL (ref 8.5–10.1)
CHLORIDE SERPL-SCNC: 104 MMOL/L (ref 94–109)
CO2 SERPL-SCNC: 23 MMOL/L (ref 20–32)
COLOR UR AUTO: YELLOW
CREAT SERPL-MCNC: 0.94 MG/DL (ref 0.66–1.25)
DIFFERENTIAL METHOD BLD: ABNORMAL
EOSINOPHIL # BLD AUTO: 0 10E9/L (ref 0–0.7)
EOSINOPHIL NFR BLD AUTO: 0.2 %
ERYTHROCYTE [DISTWIDTH] IN BLOOD BY AUTOMATED COUNT: 14.3 % (ref 10–15)
GFR SERPL CREATININE-BSD FRML MDRD: 73 ML/MIN/{1.73_M2}
GLUCOSE SERPL-MCNC: 171 MG/DL (ref 70–99)
GLUCOSE UR STRIP-MCNC: NEGATIVE MG/DL
HCT VFR BLD AUTO: 36.3 % (ref 40–53)
HGB BLD-MCNC: 12.2 G/DL (ref 13.3–17.7)
HGB UR QL STRIP: NEGATIVE
IMM GRANULOCYTES # BLD: 0 10E9/L (ref 0–0.4)
IMM GRANULOCYTES NFR BLD: 0.4 %
KETONES UR STRIP-MCNC: NEGATIVE MG/DL
LEUKOCYTE ESTERASE UR QL STRIP: ABNORMAL
LYMPHOCYTES # BLD AUTO: 0.7 10E9/L (ref 0.8–5.3)
LYMPHOCYTES NFR BLD AUTO: 7.2 %
MCH RBC QN AUTO: 32.4 PG (ref 26.5–33)
MCHC RBC AUTO-ENTMCNC: 33.6 G/DL (ref 31.5–36.5)
MCV RBC AUTO: 97 FL (ref 78–100)
MONOCYTES # BLD AUTO: 0.8 10E9/L (ref 0–1.3)
MONOCYTES NFR BLD AUTO: 7.9 %
MUCOUS THREADS #/AREA URNS LPF: PRESENT /LPF
NEUTROPHILS # BLD AUTO: 8.2 10E9/L (ref 1.6–8.3)
NEUTROPHILS NFR BLD AUTO: 84.1 %
NITRATE UR QL: NEGATIVE
NRBC # BLD AUTO: 0 10*3/UL
NRBC BLD AUTO-RTO: 0 /100
PH UR STRIP: 8 PH (ref 5–7)
PLATELET # BLD AUTO: 160 10E9/L (ref 150–450)
POTASSIUM SERPL-SCNC: 3.8 MMOL/L (ref 3.4–5.3)
RBC # BLD AUTO: 3.76 10E12/L (ref 4.4–5.9)
RBC #/AREA URNS AUTO: 11 /HPF (ref 0–2)
SODIUM SERPL-SCNC: 136 MMOL/L (ref 133–144)
SOURCE: ABNORMAL
SP GR UR STRIP: 1.01 (ref 1–1.03)
SPERM #/AREA URNS HPF: PRESENT /HPF
UROBILINOGEN UR STRIP-MCNC: 0 MG/DL (ref 0–2)
WBC # BLD AUTO: 9.8 10E9/L (ref 4–11)
WBC #/AREA URNS AUTO: 417 /HPF (ref 0–5)
WBC CLUMPS #/AREA URNS HPF: PRESENT /HPF

## 2018-12-25 PROCEDURE — 96360 HYDRATION IV INFUSION INIT: CPT | Performed by: FAMILY MEDICINE

## 2018-12-25 PROCEDURE — 81001 URINALYSIS AUTO W/SCOPE: CPT | Performed by: FAMILY MEDICINE

## 2018-12-25 PROCEDURE — 25000128 H RX IP 250 OP 636: Performed by: FAMILY MEDICINE

## 2018-12-25 PROCEDURE — 87086 URINE CULTURE/COLONY COUNT: CPT | Performed by: FAMILY MEDICINE

## 2018-12-25 PROCEDURE — 80048 BASIC METABOLIC PNL TOTAL CA: CPT | Performed by: FAMILY MEDICINE

## 2018-12-25 PROCEDURE — A9270 NON-COVERED ITEM OR SERVICE: HCPCS | Mod: GY | Performed by: FAMILY MEDICINE

## 2018-12-25 PROCEDURE — 51702 INSERT TEMP BLADDER CATH: CPT | Performed by: FAMILY MEDICINE

## 2018-12-25 PROCEDURE — 25000125 ZZHC RX 250

## 2018-12-25 PROCEDURE — 99284 EMERGENCY DEPT VISIT MOD MDM: CPT | Mod: 25 | Performed by: FAMILY MEDICINE

## 2018-12-25 PROCEDURE — 99284 EMERGENCY DEPT VISIT MOD MDM: CPT | Mod: Z6 | Performed by: FAMILY MEDICINE

## 2018-12-25 PROCEDURE — 87088 URINE BACTERIA CULTURE: CPT | Performed by: FAMILY MEDICINE

## 2018-12-25 PROCEDURE — 25000132 ZZH RX MED GY IP 250 OP 250 PS 637: Mod: GY | Performed by: FAMILY MEDICINE

## 2018-12-25 PROCEDURE — 85025 COMPLETE CBC W/AUTO DIFF WBC: CPT | Performed by: FAMILY MEDICINE

## 2018-12-25 PROCEDURE — 87186 SC STD MICRODIL/AGAR DIL: CPT | Performed by: FAMILY MEDICINE

## 2018-12-25 RX ORDER — CIPROFLOXACIN 500 MG/1
500 TABLET, FILM COATED ORAL ONCE
Status: COMPLETED | OUTPATIENT
Start: 2018-12-25 | End: 2018-12-25

## 2018-12-25 RX ORDER — CIPROFLOXACIN 500 MG/1
500 TABLET, FILM COATED ORAL 2 TIMES DAILY
Qty: 14 TABLET | Refills: 0 | Status: SHIPPED | OUTPATIENT
Start: 2018-12-25 | End: 2019-03-27

## 2018-12-25 RX ADMIN — LIDOCAINE HYDROCHLORIDE: 20 JELLY TOPICAL at 18:29

## 2018-12-25 RX ADMIN — CIPROFLOXACIN 500 MG: 500 TABLET, FILM COATED ORAL at 18:19

## 2018-12-25 RX ADMIN — SODIUM CHLORIDE 1000 ML: 9 INJECTION, SOLUTION INTRAVENOUS at 18:08

## 2018-12-25 ASSESSMENT — MIFFLIN-ST. JEOR: SCORE: 1494.11

## 2018-12-25 NOTE — ED AVS SNAPSHOT
[facility] Emergency Department  [address] [address]  Phone:  [phone]  Fax:  [phone]                                    Maxime Powell   MRN: [id number]    Department:  [facility] Emergency Department   Date of Visit:  12/25/2018           After Visit Summary Signature Page    I have received my discharge instructions, and my questions have been answered. I have discussed any challenges I see with this plan with the nurse or doctor.    ..........................................................................................................................................  Patient/Patient Representative Signature      ..........................................................................................................................................  Patient Representative Print Name and Relationship to Patient    ..................................................               ................................................  Date                                   Time    ..........................................................................................................................................  Reviewed by Signature/Title    ...................................................              ..............................................  Date                                               Time          22EPIC Rev 08/18

## 2018-12-26 NOTE — DISCHARGE INSTRUCTIONS
Return to the Emergency Room if the following occurs:     Severely worsened pain, vomiting/dehydration, fainting, or for any concern at anytime.    Or, follow-up with the following provider as we discussed:     Return to your primary doctor as needed, or if not improved over the next 48 hours (or return to the ER).    Medications discussed:    Ciprofloxacin.    If you received pain-relieving or sedating medication during your time in the ER, avoid alcohol, driving automobiles, or working with machinery.  Also, a responsible adult must stay with you.        Call the Nurse Advice Line at [phone] or [phone] for any concern at anytime.

## 2018-12-26 NOTE — ED PROVIDER NOTES
HPI  Patient is an 85-year-old male presenting with concerns for a bladder infection.  He comes from an assisted living environment.  He has a known history of bladder infections but no recent antibiotics reported or found in the chart (10/18).  Other past medical history and medications reviewed.  He does have diabetes and is on Humira and prednisone for rheumatoid arthritis.    The patient describes new onset of pelvic pain and burning with urination starting this morning.  He has had increased frequency and some incontinence.  No fever.  No flank pain.  No vomiting.  No lightheadedness or fainting.    ROS: All other review of systems are negative other than that noted above.      Past Medical History:   Diagnosis Date     Atherosclerotic heart disease of native coronary artery without angina pectoris     RCA stenting 2001     Essential (primary) hypertension     No Comments Provided     Hyperlipidemia     No Comments Provided     Mononeuropathy of left lower extremity     R/T diabetes     Osteoarthritis     No Comments Provided     Personal history of malignant neoplasm of prostate     1987,radiation     Rheumatoid arthritis (H)     1996     Strain of muscle, fascia and tendon of long head of biceps, unspecified arm, initial encounter     2003,right     Type 2 diabetes mellitus without complications (H)     2002,Type II     Past Surgical History:   Procedure Laterality Date     APPENDECTOMY OPEN      1960     ARTHROSCOPY SHOULDER ROTATOR CUFF REPAIR      2002,right, Jim Chanel MD     ARTHROTOMY WRIST      1986     COLONOSCOPY      2014     HEART CATH, ANGIOPLASTY      2001,right coronary artery     OTHER SURGICAL HISTORY      05/07/2014,[id number],SHOULDER REPLACEMENT,Right,reverse     OTHER SURGICAL HISTORY      06990.0,NM BONE SCAN 3 PHASE,09/06/13 negative for  metastases     OTHER SURGICAL HISTORY      591803,OTHER,no evidence of metastases     OTHER SURGICAL HISTORY      258877,OTHER     OTHER SURGICAL  HISTORY      342893,OTHER,right     Family History   Problem Relation Age of Onset     Cancer Father         Cancer,throat cancer     Breast Cancer Sister         Cancer-breast     Social History     Tobacco Use     Smoking status: Former Smoker     Years: 30.00     Types: Cigarettes     Last attempt to quit: 1980     Years since quittin.0     Smokeless tobacco: Former User     Types: Chew     Quit date: 2014     Tobacco comment: Quit smoking: quit smoking 33 years ago. Chew's daily   Substance Use Topics     Alcohol use: No     Drug use: Unknown     Types: Other     Comment: Drug use: No         PHYSICAL  BP (!) 180/98   Pulse 85   Temp 97.2  F (36.2  C) (Oral)   Resp 16   Ht 1.829 m (6')   Wt 77.1 kg (170 lb)   SpO2 96%   BMI 23.06 kg/m    General: Patient is alert and in mild distress.  Cooperative but not extremely sharp when presenting history as he seems forgetful.  Thin.  Neurological: Alert.  Moving upper and lower extremities equally, bilaterally.  Head / Neck: Atraumatic.  Ears: Not done.  Eyes: Pupils are equal, round, and reactive.  Normal conjunctiva.  Nose: Midline.  No epistaxis.  Mouth / Throat: Dry mucosa. Respiratory: No respiratory distress.  Cardiovascular: Regular rhythm.  Peripheral extremities are warm.  Abdomen / Pelvis: Mild tenderness in the suprapubic region.  Rest of his abdomen is soft and not tender.  Genitalia: Not done.  Musculoskeletal: Not tender to palpation over major muscles and joints.  Skin: No evidence of rash or trauma.        PHYSICIAN  1803.  The patient has an abnormal urine concerning for acute cystitis.  Low concern for pyelonephritis.  Low concern for sepsis.  No emergent need for hospitalization.  He does seem somewhat dry so fluid bolus will be given.  He does not have a fever though and is not tachycardic or hypotensive.  His white blood cell count is normal.  Oral antibiotics will be given with ciprofloxacin.  A prescription will be provided as  well.  Low concern for hypoglycemia with ciprofloxacin given his current numbers and his use of prednisone and the fact that he has an ongoing illness.    Labs Ordered and Resulted from Time of ED Arrival Up to the Time of Departure from the ED   CBC WITH PLATELETS DIFFERENTIAL - Abnormal; Notable for the following components:       Result Value    RBC Count 3.76 (*)     Hemoglobin 12.2 (*)     Hematocrit 36.3 (*)     Absolute Lymphocytes 0.7 (*)     All other components within normal limits   BASIC METABOLIC PANEL - Abnormal; Notable for the following components:    Glucose 171 (*)     Calcium 8.3 (*)     All other components within normal limits   ROUTINE UA WITH MICROSCOPIC REFLEX TO CULTURE - Abnormal; Notable for the following components:    pH Urine 8.0 (*)     Protein Albumin Urine 100 (*)     Leukocyte Esterase Urine Large (*)     WBC Urine 417 (*)     RBC Urine 11 (*)     WBC Clumps Present (*)     Bacteria Urine Moderate (*)     Mucous Urine Present (*)     sperm Present (*)     All other components within normal limits     1915.  The patient had pelvic discomfort as above.  The nurse found that the indwelling catheter was not draining.  A new catheter was placed.  The patient's pain is much improved.  His urine is obviously abnormal as above.  I will continue to provide an antibiotic for concern of infection.  Follow-up discussed.      IMPRESSION    ICD-10-CM    1. Acute cystitis without hematuria N30.00    2. Urine retention R33.9             Jaxon Feliciano MD  12/25/18 1915

## 2018-12-29 ENCOUNTER — TELEPHONE (OUTPATIENT)
Dept: EMERGENCY MEDICINE | Facility: CLINIC | Age: 83
End: 2018-12-29

## 2018-12-29 LAB
BACTERIA SPEC CULT: ABNORMAL
SPECIMEN SOURCE: ABNORMAL

## 2018-12-29 NOTE — TELEPHONE ENCOUNTER
1:28PM: Spoke with Patient's daughter in law Beatris. Advised per ED providers recommendation to continue with the antibiotic and follow up with PCP on Monday. Beatris is comfortable with this plan of care.   Advised to return to ED or contact PCP if symptoms worsen or has other questions or concerns.    Janine Guthrie RN  [facility] RN  Lung Nodule and ED Lab Result RN  Epic pool (ED late result f/u RN): [id number]  [facility] INCIDENTAL RADIOLOGY F/U NURSES: P [id number]  # [phone]

## 2018-12-29 NOTE — TELEPHONE ENCOUNTER
[facility] Emergency Department Lab result notification [Adult-Male]    [place] ED lab result protocol used  Urine culture    Reason for call  Notify of lab results, assess symptoms,  review ED providers recommendations/discharge instructions (if necessary) and advise per ED lab result f/u protocol    Lab Result (including Rx patient on, if applicable)  Final Urine Culture Report on 12/29/18  Emergency Dept/Urgent Care discharge antibiotic prescribed: Ciprofloxacin (Cipro) 500 mg tablet, 1 tablet (500 mg) by mouth 2 times daily for 7 days.  #1. Bacteria, >100,000 colonies/mL Pseudomonas aeruginosa,  is [ SUSCEPTIBLE] to antibiotic.   #2. Bacteria, >100,000 colonies/mL Streptococcus agalactiae B,  is [NOT TESTED] to antibiotic.   #3. Bacteria, >100,000 colonies/mL Strain 2 Pseudomonas aeruginosa,  is [SUSCEPTIBLE] to antibiotic.   Change in treatment as per [place] ED Lab result protocol.    Information table from ED Provider visit on 12/25/18  Symptoms reported at ED visit (Chief complaint, HPI) Patient is an 85-year-old male presenting with concerns for a bladder infection.  He comes from an assisted living environment.  He has a known history of bladder infections but no recent antibiotics reported or found in the chart (10/18).  Other past medical history and medications reviewed.  He does have diabetes and is on Humira and prednisone for rheumatoid arthritis.     The patient describes new onset of pelvic pain and burning with urination starting this morning.  He has had increased frequency and some incontinence.  No fever.  No flank pain.  No vomiting.  No lightheadedness or fainting.   Significant Medical hx, if applicable (i.e. CKD, diabetes) Diabetes type 2   Allergies Allergies   Allergen Reactions     Finasteride      Other reaction(s): Gynecomastia  Required tamoxifen treatment     Penicillins      Other reaction(s): Edema, Erythema     Gold Rash     Injectable gold      Weight, if  applicable Wt Readings from Last 2 Encounters:   12/25/18 77.1 kg (170 lb)   08/19/16 77.1 kg (170 lb)      Coumadin/Warfarin [Yes /No] No   Creatinine Level (mg/dl) Creatinine   Date Value Ref Range Status   12/25/2018 0.94 0.66 - 1.25 mg/dL Final      Creatinine clearance (ml/min), if applicable Serum creatinine: 0.94 mg/dL 12/25/18 1659  Estimated creatinine clearance: 62.7 mL/min   ED providers Impression and Plan (applicable information) The patient had pelvic discomfort as above.  The nurse found that the indwelling catheter was not draining.  A new catheter was placed.  The patient's pain is much improved.  His urine is obviously abnormal as above.  I will continue to provide an antibiotic for concern of infection.  Follow-up discussed.   ED diagnosis 1. Acute cystitis without hematuria N30.00     2. Urine retention R33.9         ED provider Jaxon Feliciano MD      RN Assessment (Patient s current Symptoms), include time called.  [Insert Left message here if message left]  12:51PM: Spoke with Shaun CAST at the [facility]. No nurse is available on the weekend. Shaun states that the Patient is doing well. Murcia is draining well. Patient denies any pain. No fever. Eating and drinking well.   1:18PM: Spoke with Beatris, Patient's daughter in law. She saw the Patient yesterday. States that he still had some occasional pain in the bladder area. Urine looked clear.   Beatris states that the last time the Patient was put on an antibiotic he was told by his rheumatologist that his meds might decrease the effectiveness of his antibiotic. (Humira and Methotrexate)     RN Recommendations/Instructions per [place] ED lab result protocol  Patient's daughter in law notified of lab result and treatment recommendations.      [place] Emergency Department Provider Name & Recommendations (included time consulted)  1:26PM: Consulted with Dr. Feliciano at the [facility] ED. Provider advised to continue with the  same antibiotic and to follow up with his PCP on Monday 12/31/18.      Please Contact your PCP clinic or return to the Emergency department if your:    Symptoms worsen or other concerning symptom's.    PCP follow-up Questions asked: YES       [RN Name]  Janine Guthrie RN  [facility] RN  Lung Nodule and ED Lab Result RN  Epic pool (ED late result f/u RN): P [id number]  FV INCIDENTAL RADIOLOGY F/U NURSES: [id number]  # [phone]      Copy of Lab result  Urine Culture Aerobic Bacterial [[id number]] (Order [id number])   Exam Information     Exam Date Exam Time Accession # Results    12/25/18  4:59 PM [id number]    Component Results     Specimen Information: Midstream Urine        Component Collected Lab   Specimen Description 12/25/2018  4:59    Midstream Urine    Culture Micro (Abnormal) 12/25/2018  4:59    Abnormal   >100,000 colonies/mL   Pseudomonas aeruginosa     Culture Micro (Abnormal) 12/25/2018  4:59    Abnormal   >100,000 colonies/mL   Streptococcus agalactiae sero group B   Susceptibility testing not routinely done on this organism from the genitourinary tract.   Our antibiogram indicates that Group B streptococci are susceptible to ampicillin,   penicillin, vancomycin and the cephalosporins. Susceptibility testing must be requested   within 5 days.   Group B Streptococcus may be significant in OB patients, however, it is part of the normal    urogenital andrés.     Culture Micro (Abnormal) 12/25/2018  4:59    Abnormal   >100,000 colonies/mL   Strain 2   Pseudomonas aeruginosa     Susceptibility     Pseudomonas aeruginosa (1)     Antibiotic Interpretation Sensitivity Method Status   AMIKACIN Sensitive <=2 ug/mL KYLEE Final   CEFEPIME Sensitive 4 ug/mL KYLEE Final   CEFTAZIDIME Sensitive 4 ug/mL KYLEE Final   CIPROFLOXACIN Sensitive <=0.25 ug/mL KYLEE Final   GENTAMICIN Sensitive <=1 ug/mL KYLEE Final   LEVOFLOXACIN Sensitive 1 ug/mL KYLEE Final   Piperacillin/Tazo Sensitive 8 ug/mL KYLEE  Final   TOBRAMYCIN Sensitive <=1 ug/mL KYLEE Final   MEROPENEM Sensitive <=0.25 ug/mL KYLEE Final   Pseudomonas aeruginosa (3)     Antibiotic Interpretation Sensitivity Method Status   AMIKACIN Sensitive <=2 ug/mL KYLEE Final   CEFEPIME Sensitive 2 ug/mL KYLEE Final   CEFTAZIDIME Sensitive 4 ug/mL KYLEE Final   CIPROFLOXACIN Sensitive <=0.25 ug/mL KYLEE Final   GENTAMICIN Sensitive <=1 ug/mL KYLEE Final   LEVOFLOXACIN Sensitive 1 ug/mL KYLEE Final   Piperacillin/Tazo Sensitive 8 ug/mL KYLEE Final   TOBRAMYCIN Sensitive <=1 ug/mL KYLEE Final   MEROPENEM Sensitive <=0.25 ug/mL KYLEE Final

## 2019-01-02 ENCOUNTER — HOSPITAL ENCOUNTER (EMERGENCY)
Facility: CLINIC | Age: 84
Discharge: HOME | End: 2019-01-02
Attending: EMERGENCY MEDICINE | Admitting: EMERGENCY MEDICINE
Payer: MEDICARE

## 2019-01-02 VITALS
BODY MASS INDEX: 22.16 KG/M2 | TEMPERATURE: 98.6 F | OXYGEN SATURATION: 97 % | DIASTOLIC BLOOD PRESSURE: 82 MMHG | RESPIRATION RATE: 13 BRPM | HEART RATE: 66 BPM | WEIGHT: 163.4 LBS | SYSTOLIC BLOOD PRESSURE: 146 MMHG

## 2019-01-02 DIAGNOSIS — R46.89 CHANGE IN BEHAVIOR: ICD-10-CM

## 2019-01-02 LAB
ALBUMIN SERPL-MCNC: 2.9 G/DL (ref 3.4–5)
ALBUMIN UR-MCNC: NEGATIVE MG/DL
ALP SERPL-CCNC: 42 U/L (ref 40–150)
ALT SERPL W P-5'-P-CCNC: 19 U/L (ref 0–70)
ANION GAP SERPL CALCULATED.3IONS-SCNC: 7 MMOL/L (ref 3–14)
APPEARANCE UR: CLEAR
AST SERPL W P-5'-P-CCNC: 22 U/L (ref 0–45)
BASOPHILS # BLD AUTO: 0 10E9/L (ref 0–0.2)
BASOPHILS NFR BLD AUTO: 0.4 %
BILIRUB SERPL-MCNC: 0.4 MG/DL (ref 0.2–1.3)
BILIRUB UR QL STRIP: NEGATIVE
BUN SERPL-MCNC: 26 MG/DL (ref 7–30)
CALCIUM SERPL-MCNC: 8.1 MG/DL (ref 8.5–10.1)
CHLORIDE SERPL-SCNC: 109 MMOL/L (ref 94–109)
CO2 SERPL-SCNC: 26 MMOL/L (ref 20–32)
COLOR UR AUTO: YELLOW
CREAT SERPL-MCNC: 1.13 MG/DL (ref 0.66–1.25)
DIFFERENTIAL METHOD BLD: ABNORMAL
EOSINOPHIL # BLD AUTO: 0.1 10E9/L (ref 0–0.7)
EOSINOPHIL NFR BLD AUTO: 2.1 %
ERYTHROCYTE [DISTWIDTH] IN BLOOD BY AUTOMATED COUNT: 14 % (ref 10–15)
GFR SERPL CREATININE-BSD FRML MDRD: 59 ML/MIN/{1.73_M2}
GLUCOSE SERPL-MCNC: 97 MG/DL (ref 70–99)
GLUCOSE UR STRIP-MCNC: NEGATIVE MG/DL
HCT VFR BLD AUTO: 32.9 % (ref 40–53)
HGB BLD-MCNC: 10.9 G/DL (ref 13.3–17.7)
HGB UR QL STRIP: ABNORMAL
IMM GRANULOCYTES # BLD: 0 10E9/L (ref 0–0.4)
IMM GRANULOCYTES NFR BLD: 0.2 %
KETONES UR STRIP-MCNC: NEGATIVE MG/DL
LACTATE BLD-SCNC: 1.6 MMOL/L (ref 0.7–2)
LEUKOCYTE ESTERASE UR QL STRIP: NEGATIVE
LYMPHOCYTES # BLD AUTO: 1.3 10E9/L (ref 0.8–5.3)
LYMPHOCYTES NFR BLD AUTO: 27 %
MCH RBC QN AUTO: 32.4 PG (ref 26.5–33)
MCHC RBC AUTO-ENTMCNC: 33.1 G/DL (ref 31.5–36.5)
MCV RBC AUTO: 98 FL (ref 78–100)
MONOCYTES # BLD AUTO: 0.3 10E9/L (ref 0–1.3)
MONOCYTES NFR BLD AUTO: 6.5 %
MUCOUS THREADS #/AREA URNS LPF: PRESENT /LPF
NEUTROPHILS # BLD AUTO: 3.1 10E9/L (ref 1.6–8.3)
NEUTROPHILS NFR BLD AUTO: 63.8 %
NITRATE UR QL: NEGATIVE
NRBC # BLD AUTO: 0 10*3/UL
NRBC BLD AUTO-RTO: 0 /100
PH UR STRIP: 6 PH (ref 5–7)
PLATELET # BLD AUTO: 161 10E9/L (ref 150–450)
POTASSIUM SERPL-SCNC: 3.9 MMOL/L (ref 3.4–5.3)
PROT SERPL-MCNC: 6.2 G/DL (ref 6.8–8.8)
RBC # BLD AUTO: 3.36 10E12/L (ref 4.4–5.9)
RBC #/AREA URNS AUTO: 12 /HPF (ref 0–2)
SODIUM SERPL-SCNC: 142 MMOL/L (ref 133–144)
SOURCE: ABNORMAL
SP GR UR STRIP: 1.01 (ref 1–1.03)
SQUAMOUS #/AREA URNS AUTO: <1 /HPF (ref 0–1)
UROBILINOGEN UR STRIP-MCNC: 0 MG/DL (ref 0–2)
WBC # BLD AUTO: 4.8 10E9/L (ref 4–11)
WBC #/AREA URNS AUTO: 2 /HPF (ref 0–5)

## 2019-01-02 PROCEDURE — 99283 EMERGENCY DEPT VISIT LOW MDM: CPT | Mod: Z6 | Performed by: EMERGENCY MEDICINE

## 2019-01-02 PROCEDURE — 81001 URINALYSIS AUTO W/SCOPE: CPT | Performed by: EMERGENCY MEDICINE

## 2019-01-02 PROCEDURE — 83605 ASSAY OF LACTIC ACID: CPT | Performed by: EMERGENCY MEDICINE

## 2019-01-02 PROCEDURE — 80053 COMPREHEN METABOLIC PANEL: CPT | Performed by: EMERGENCY MEDICINE

## 2019-01-02 PROCEDURE — 85025 COMPLETE CBC W/AUTO DIFF WBC: CPT | Performed by: EMERGENCY MEDICINE

## 2019-01-02 PROCEDURE — 99283 EMERGENCY DEPT VISIT LOW MDM: CPT | Performed by: EMERGENCY MEDICINE

## 2019-01-02 NOTE — ED AVS SNAPSHOT
[facility] Emergency Department  [address] [address]  Phone:  [phone]  Fax:  [phone]                                    Maxime Powell   MRN: [id number]    Department:  [facility] Emergency Department   Date of Visit:  1/2/2019           After Visit Summary Signature Page    I have received my discharge instructions, and my questions have been answered. I have discussed any challenges I see with this plan with the nurse or doctor.    ..........................................................................................................................................  Patient/Patient Representative Signature      ..........................................................................................................................................  Patient Representative Print Name and Relationship to Patient    ..................................................               ................................................  Date                                   Time    ..........................................................................................................................................  Reviewed by Signature/Title    ...................................................              ..............................................  Date                                               Time          22EPIC Rev 08/18

## 2019-01-03 NOTE — DISCHARGE INSTRUCTIONS
There is reviewed vomiting, fever, difficulty breathing, or other concerns.  Otherwise follow-up in clinic.  Continue home medications.

## 2019-01-03 NOTE — ED PROVIDER NOTES
"  History     Chief Complaint   Patient presents with     Fever     Pt brought in by EMS for \"a little bit of a fever\" which EMS states was not reported to them. States pt recently treated for UTI. Pt does have a cath. in place.     HPI  Maxime Powell is a 85 year old male who presents for possible fever and change in behavior at the nursing home.  The patient has a history of dementia and is at a memory care unit.  History obtained from EMS, the patient, and the son.  Apparently today the patient became slightly more argumentative and combative with the nursing home staff, he was hallucinating more than they thought was normal for him.  They felt he was a bit warm but did not have recorded elevated temperature.  They sent him here for further evaluation.  The patient currently denies complaints.  He specifically denies chest pain, cough, difficulty breathing, headache, sore throat, ear pain, abdominal pain, nausea, diarrhea, or rash.    Problem List:    Patient Active Problem List    Diagnosis Date Noted     Coronary atherosclerosis 01/31/2018     Priority: Medium     Cough 01/31/2018     Priority: Medium     Elevated prostate specific antigen (PSA) 01/31/2018     Priority: Medium     Lumbago 01/31/2018     Priority: Medium     Migraine headache 01/31/2018     Priority: Medium     Nocturia 01/31/2018     Priority: Medium     Olecranon bursitis 01/31/2018     Priority: Medium     Other symptoms involving digestive system(787.99) 01/31/2018     Priority: Medium     Pain in joint, shoulder region 01/31/2018     Priority: Medium     Urinary frequency 01/31/2018     Priority: Medium     Aftercare following surgery of the musculoskeletal system 11/04/2014     Priority: Medium     Acute urinary retention 05/07/2014     Priority: Medium     ASCVD (arteriosclerotic cardiovascular disease) 05/07/2014     Priority: Medium     Overview:   S/p RCA stenting 2001       Diabetes mellitus, type 2 (H) 05/07/2014     Priority: " Medium     Hyperlipidemia 05/07/2014     Priority: Medium     Hypertension 05/07/2014     Priority: Medium     Rheumatoid arthritis (H) 05/07/2014     Priority: Medium     Osteoarthritis of glenohumeral joint 02/13/2014     Priority: Medium     Prostate cancer (H) 08/27/2013     Priority: Medium     Overview:   S/p radition tx 1987 in [place]           Past Medical History:    Past Medical History:   Diagnosis Date     Atherosclerotic heart disease of native coronary artery without angina pectoris      Essential (primary) hypertension      Hyperlipidemia      Mononeuropathy of left lower extremity      Osteoarthritis      Personal history of malignant neoplasm of prostate      RA (rheumatoid arthritis) (H)      Rheumatoid arthritis (H)      Sjogren-Ashwin syndrome      Strain of muscle, fascia and tendon of long head of biceps, unspecified arm, initial encounter      Type 2 diabetes mellitus without complications (H)        Past Surgical History:    Past Surgical History:   Procedure Laterality Date     APPENDECTOMY OPEN      1960     ARTHROSCOPY SHOULDER ROTATOR CUFF REPAIR      2002,right, Jim Chanel MD     ARTHROTOMY WRIST      1986     COLONOSCOPY      2014     HEART CATH, ANGIOPLASTY      2001,right coronary artery     OTHER SURGICAL HISTORY      05/07/2014,[id number],SHOULDER REPLACEMENT,Right,reverse     OTHER SURGICAL HISTORY      91037.0,NM BONE SCAN 3 PHASE,09/06/13 negative for  metastases     OTHER SURGICAL HISTORY      357529,OTHER,no evidence of metastases     OTHER SURGICAL HISTORY      302233,OTHER     OTHER SURGICAL HISTORY      893783,OTHER,right       Family History:    Family History   Problem Relation Age of Onset     Cancer Father         Cancer,throat cancer     Breast Cancer Sister         Cancer-breast       Social History:  Marital Status:   [5]  Social History     Tobacco Use     Smoking status: Former Smoker     Years: 30.00     Types: Cigarettes     Last attempt to quit:  1980     Years since quittin.0     Smokeless tobacco: Former User     Types: Lorenzo     Quit date: 2014     Tobacco comment: Quit smoking: quit smoking 33 years ago. Chew's daily   Substance Use Topics     Alcohol use: No     Drug use: No     Comment: Drug use: No        Medications:      adalimumab (HUMIRA) 40 MG/0.8ML prefilled syringe kit   aspirin 81 MG tablet   atenolol (TENORMIN) 50 MG tablet   calcium-vitamin D (CALTRATE) 600-400 MG-UNIT per tablet   glipiZIDE (GLUCOTROL) 5 MG tablet   hydroxychloroquine (PLAQUENIL) 200 MG tablet   lisinopril (PRINIVIL/ZESTRIL) 20 MG tablet   metFORMIN (GLUCOPHAGE) 500 MG tablet   Multiple Vitamins-Minerals (MULTI-VITAMIN/MINERALS) TABS   predniSONE (DELTASONE) 5 MG tablet   simvastatin (ZOCOR) 40 MG tablet   tamsulosin (FLOMAX) 0.4 MG capsule         Review of Systems  Pertinent positives and negatives listed in the HPI, all other systems reviewed and are negative.    Physical Exam   BP: 131/81  Pulse: 77  Heart Rate: 68  Temp: 98.6  F (37  C)  Resp: 16  Weight: 74.1 kg (163 lb 6.4 oz)  SpO2: 97 %      Physical Exam   Constitutional: He appears well-developed and well-nourished. He appears distressed.   HENT:   Head: Normocephalic and atraumatic.   Right Ear: External ear normal.   Left Ear: External ear normal.   Nose: Nose normal.   Eyes: Conjunctivae are normal. No scleral icterus.   Neck: Normal range of motion.   Cardiovascular: Normal rate. An irregularly irregular rhythm present.   Pulmonary/Chest: Effort normal. No stridor. No respiratory distress. He has no wheezes.   Abdominal: Soft. He exhibits no distension. There is no tenderness. There is no guarding.   Neurological: He is alert. He exhibits normal muscle tone.   Skin: Skin is warm and dry. He is not diaphoretic.   Psychiatric: He has a normal mood and affect. His behavior is normal.   Nursing note and vitals reviewed.      ED Course        Procedures               Critical Care time:  none                Results for orders placed or performed during the hospital encounter of 01/02/19 (from the past 24 hour(s))   Lactic acid whole blood   Result Value Ref Range    Lactic Acid 1.6 0.7 - 2.0 mmol/L   CBC with platelets differential   Result Value Ref Range    WBC 4.8 4.0 - 11.0 10e9/L    RBC Count 3.36 (L) 4.4 - 5.9 10e12/L    Hemoglobin 10.9 (L) 13.3 - 17.7 g/dL    Hematocrit 32.9 (L) 40.0 - 53.0 %    MCV 98 78 - 100 fl    MCH 32.4 26.5 - 33.0 pg    MCHC 33.1 31.5 - 36.5 g/dL    RDW 14.0 10.0 - 15.0 %    Platelet Count 161 150 - 450 10e9/L    Diff Method Automated Method     % Neutrophils 63.8 %    % Lymphocytes 27.0 %    % Monocytes 6.5 %    % Eosinophils 2.1 %    % Basophils 0.4 %    % Immature Granulocytes 0.2 %    Nucleated RBCs 0 0 /100    Absolute Neutrophil 3.1 1.6 - 8.3 10e9/L    Absolute Lymphocytes 1.3 0.8 - 5.3 10e9/L    Absolute Monocytes 0.3 0.0 - 1.3 10e9/L    Absolute Eosinophils 0.1 0.0 - 0.7 10e9/L    Absolute Basophils 0.0 0.0 - 0.2 10e9/L    Abs Immature Granulocytes 0.0 0 - 0.4 10e9/L    Absolute Nucleated RBC 0.0    Comprehensive metabolic panel   Result Value Ref Range    Sodium 142 133 - 144 mmol/L    Potassium 3.9 3.4 - 5.3 mmol/L    Chloride 109 94 - 109 mmol/L    Carbon Dioxide 26 20 - 32 mmol/L    Anion Gap 7 3 - 14 mmol/L    Glucose 97 70 - 99 mg/dL    Urea Nitrogen 26 7 - 30 mg/dL    Creatinine 1.13 0.66 - 1.25 mg/dL    GFR Estimate 59 (L) >60 mL/min/[1.73_m2]    GFR Estimate If Black 68 >60 mL/min/[1.73_m2]    Calcium 8.1 (L) 8.5 - 10.1 mg/dL    Bilirubin Total 0.4 0.2 - 1.3 mg/dL    Albumin 2.9 (L) 3.4 - 5.0 g/dL    Protein Total 6.2 (L) 6.8 - 8.8 g/dL    Alkaline Phosphatase 42 40 - 150 U/L    ALT 19 0 - 70 U/L    AST 22 0 - 45 U/L   UA reflex to Microscopic   Result Value Ref Range    Color Urine Yellow     Appearance Urine Clear     Glucose Urine Negative NEG^Negative mg/dL    Bilirubin Urine Negative NEG^Negative    Ketones Urine Negative NEG^Negative mg/dL    Specific Gravity  Urine 1.014 1.003 - 1.035    Blood Urine Small (A) NEG^Negative    pH Urine 6.0 5.0 - 7.0 pH    Protein Albumin Urine Negative NEG^Negative mg/dL    Urobilinogen mg/dL 0.0 0.0 - 2.0 mg/dL    Nitrite Urine Negative NEG^Negative    Leukocyte Esterase Urine Negative NEG^Negative    Source Catheterized Urine     RBC Urine 12 (H) 0 - 2 /HPF    WBC Urine 2 0 - 5 /HPF    Squamous Epithelial /HPF Urine <1 0 - 1 /HPF    Mucous Urine Present (A) NEG^Negative /LPF       Medications - No data to display    Assessments & Plan (with Medical Decision Making)   85-year-old male who presents for change in behavior.  Heart rate 63, blood pressure 133/72, temperature 98.6 F, SPO2 is 96% on room air.  Lungs are clear to auscultation, no respiratory distress, no signs of pneumonia, no indication for chest x-ray.  Abdominal exam is benign and not concerning for an acute surgical process such as appendicitis or cholecystitis.  White blood cell count is normal at 4.8 which is reassuring.  Hemoglobin stable at 10.9, not significantly down from 12.2 last week.  Lactic acid is normal at 1.6.  Urinalysis normal without signs of infection.  Electrolytes are within normal limits.  No signs of cellulitis on a full skin exam.  At this point.  He is afebrile here and tolerating oral intakes.  Is safe to discharge home with instructions to continue his normal cares, follow-up in clinic, or return if worse.    I have reviewed the nursing notes.    I have reviewed the findings, diagnosis, plan and need for follow up with the patient.          Medication List      ASK your doctor about these medications    ciprofloxacin 500 MG tablet  Commonly known as:  CIPRO  500 mg, Oral, 2 TIMES DAILY  Ask about: Should I take this medication?            Final diagnoses:   Change in behavior       1/2/2019   [facility] EMERGENCY DEPARTMENT     Bebeto Flores MD  01/02/19 6474

## 2019-01-03 NOTE — ED NOTES
Spoke with pt's son. Discharge and follow up instructions discussed with son. States he will come to pick the pt up.

## 2019-01-03 NOTE — ED NOTES
Pt presented with a martinez cath draining to gravity. Clear straw yellow urine noted. No blood noted around meatus or catheter at point of entry.

## 2019-01-03 NOTE — ED NOTES
Bed: ED11  Expected date:   Expected time:   Means of arrival:   Comments:  EMS coming from NH with temp and possible UTI

## 2019-01-03 NOTE — ED NOTES
Nursing facility contacted and spoke with the lead aid. Informed of pt's return and condition. Informed no nursing staff available to give further report to.

## 2019-01-03 NOTE — ED NOTES
Call received from pt's son. Son states pt had been having visual hallucinations, states he is seeing children that are not present. Son also states the pt became combative with staff. Son further states he will pick the pt up should he be discharged. Provider made aware of son's concerns.

## 2019-01-11 ENCOUNTER — RECORDS - HEALTHEAST (OUTPATIENT)
Dept: LAB | Facility: CLINIC | Age: 84
End: 2019-01-11

## 2019-01-11 ENCOUNTER — HOSPITAL ENCOUNTER (EMERGENCY)
Facility: CLINIC | Age: 84
Discharge: HOME | End: 2019-01-11
Attending: STUDENT IN AN ORGANIZED HEALTH CARE EDUCATION/TRAINING PROGRAM | Admitting: STUDENT IN AN ORGANIZED HEALTH CARE EDUCATION/TRAINING PROGRAM
Payer: MEDICARE

## 2019-01-11 VITALS
HEIGHT: 72 IN | DIASTOLIC BLOOD PRESSURE: 85 MMHG | TEMPERATURE: 98.4 F | HEART RATE: 87 BPM | WEIGHT: 156.9 LBS | OXYGEN SATURATION: 99 % | BODY MASS INDEX: 21.25 KG/M2 | RESPIRATION RATE: 16 BRPM | SYSTOLIC BLOOD PRESSURE: 171 MMHG

## 2019-01-11 DIAGNOSIS — T83.9XXA PROBLEM WITH FOLEY CATHETER, INITIAL ENCOUNTER (H): ICD-10-CM

## 2019-01-11 LAB
ALBUMIN UR-MCNC: ABNORMAL MG/DL
AMORPH CRY #/AREA URNS HPF: ABNORMAL /[HPF]
APPEARANCE UR: ABNORMAL
BACTERIA #/AREA URNS HPF: ABNORMAL HPF
BILIRUB UR QL STRIP: NEGATIVE
COLOR UR AUTO: YELLOW
GLUCOSE UR STRIP-MCNC: NEGATIVE MG/DL
HGB UR QL STRIP: ABNORMAL
KETONES UR STRIP-MCNC: NEGATIVE MG/DL
LEUKOCYTE ESTERASE UR QL STRIP: ABNORMAL
MUCOUS THREADS #/AREA URNS LPF: ABNORMAL LPF
NITRATE UR QL: NEGATIVE
PH UR STRIP: 5.5 [PH] (ref 4.5–8)
RBC #/AREA URNS AUTO: ABNORMAL HPF
SP GR UR STRIP: 1.02 (ref 1–1.03)
SQUAMOUS #/AREA URNS AUTO: ABNORMAL LPF
UROBILINOGEN UR STRIP-ACNC: ABNORMAL
WBC #/AREA URNS AUTO: ABNORMAL HPF
WBC CLUMPS #/AREA URNS HPF: PRESENT /[HPF]

## 2019-01-11 PROCEDURE — 99283 EMERGENCY DEPT VISIT LOW MDM: CPT | Mod: Z6 | Performed by: STUDENT IN AN ORGANIZED HEALTH CARE EDUCATION/TRAINING PROGRAM

## 2019-01-11 PROCEDURE — 99283 EMERGENCY DEPT VISIT LOW MDM: CPT | Performed by: STUDENT IN AN ORGANIZED HEALTH CARE EDUCATION/TRAINING PROGRAM

## 2019-01-11 ASSESSMENT — MIFFLIN-ST. JEOR: SCORE: 1434.69

## 2019-01-11 NOTE — ED AVS SNAPSHOT
[facility] Emergency Department  [address] [address]  Phone:  [phone]  Fax:  [phone]                                    Maxime Powell   MRN: [id number]    Department:  [facility] Emergency Department   Date of Visit:  1/11/2019           After Visit Summary Signature Page    I have received my discharge instructions, and my questions have been answered. I have discussed any challenges I see with this plan with the nurse or doctor.    ..........................................................................................................................................  Patient/Patient Representative Signature      ..........................................................................................................................................  Patient Representative Print Name and Relationship to Patient    ..................................................               ................................................  Date                                   Time    ..........................................................................................................................................  Reviewed by Signature/Title    ...................................................              ..............................................  Date                                               Time          22EPIC Rev 08/18

## 2019-01-12 LAB — BACTERIA SPEC CULT: NORMAL

## 2019-01-12 NOTE — ED NOTES
Son here to  pt. disch instructions reviewed with son states understanding. Pt fit with leg bag prior to leaving.

## 2019-01-12 NOTE — ED NOTES
Upon assessment to place martinez, patient is noted to have a martinez already. Pt has small amount of bloody discharge from meatus. Pt is noted to have no urine output in leg bag. RN to deflate martinez balloon, advance catheter with noted large amount of bertha/bloody urine returned. Will update MD.

## 2019-01-12 NOTE — ED PROVIDER NOTES
History     Chief Complaint   Patient presents with     Catheter Problem     martinez pulled while baloon inflated two days ago, martinez not replaced at that time. pt c/o increased pain in penis, some bleeding and not being able to pee for 2 days.      JORGE Powell is a 85 year old male with past medical history which includes ASCVD, type II DM, hypertension, hyperlipidemia, prostate cancer, urinary retention, and nocturia who presents from local [facility] for evaluation after report of patient having removed his catheter 2 days ago.  By EMS report, the patient is confused at baseline and had apparently pulled out his Martinez catheter 2 days ago.  He has since had some mild spotting or bleeding from the urethra but patient reportedly began complaining of suprapubic discomfort today.  Nursing facility had called patient's son who requested patient be transferred to the department.  In the department the patient is alert but not oriented to place or time.  He complains of mild suprapubic pressure but denies any other symptoms.    Problem List:    Patient Active Problem List    Diagnosis Date Noted     Coronary atherosclerosis 01/31/2018     Priority: Medium     Cough 01/31/2018     Priority: Medium     Elevated prostate specific antigen (PSA) 01/31/2018     Priority: Medium     Lumbago 01/31/2018     Priority: Medium     Migraine headache 01/31/2018     Priority: Medium     Nocturia 01/31/2018     Priority: Medium     Olecranon bursitis 01/31/2018     Priority: Medium     Other symptoms involving digestive system(787.99) 01/31/2018     Priority: Medium     Pain in joint, shoulder region 01/31/2018     Priority: Medium     Urinary frequency 01/31/2018     Priority: Medium     Aftercare following surgery of the musculoskeletal system 11/04/2014     Priority: Medium     Acute urinary retention 05/07/2014     Priority: Medium     ASCVD (arteriosclerotic cardiovascular disease) 05/07/2014     Priority:  Medium     Overview:   S/p RCA stenting 2001       Diabetes mellitus, type 2 (H) 05/07/2014     Priority: Medium     Hyperlipidemia 05/07/2014     Priority: Medium     Hypertension 05/07/2014     Priority: Medium     Rheumatoid arthritis (H) 05/07/2014     Priority: Medium     Osteoarthritis of glenohumeral joint 02/13/2014     Priority: Medium     Prostate cancer (H) 08/27/2013     Priority: Medium     Overview:   S/p radition tx 1987 in [place]           Past Medical History:    Past Medical History:   Diagnosis Date     Atherosclerotic heart disease of native coronary artery without angina pectoris      Essential (primary) hypertension      Hyperlipidemia      Mononeuropathy of left lower extremity      Osteoarthritis      Personal history of malignant neoplasm of prostate      RA (rheumatoid arthritis) (H)      Rheumatoid arthritis (H)      Sjogren-Ashwin syndrome      Strain of muscle, fascia and tendon of long head of biceps, unspecified arm, initial encounter      Type 2 diabetes mellitus without complications (H)        Past Surgical History:    Past Surgical History:   Procedure Laterality Date     APPENDECTOMY OPEN      1960     ARTHROSCOPY SHOULDER ROTATOR CUFF REPAIR      2002,right, Jim Chanel MD     ARTHROTOMY WRIST      1986     COLONOSCOPY      2014     HEART CATH, ANGIOPLASTY      2001,right coronary artery     OTHER SURGICAL HISTORY      05/07/2014,[id number],SHOULDER REPLACEMENT,Right,reverse     OTHER SURGICAL HISTORY      88537.0,NM BONE SCAN 3 PHASE,09/06/13 negative for  metastases     OTHER SURGICAL HISTORY      069089,OTHER,no evidence of metastases     OTHER SURGICAL HISTORY      122017,OTHER     OTHER SURGICAL HISTORY      941995,OTHER,right       Family History:    Family History   Problem Relation Age of Onset     Cancer Father         Cancer,throat cancer     Breast Cancer Sister         Cancer-breast       Social History:  Marital Status:   [5]  Social History     Tobacco Use      Smoking status: Former Smoker     Years: 30.00     Types: Cigarettes     Last attempt to quit: 1980     Years since quittin.0     Smokeless tobacco: Former User     Types: Chew     Quit date: 2014     Tobacco comment: Quit smoking: quit smoking 33 years ago. Chew's daily   Substance Use Topics     Alcohol use: No     Drug use: No     Comment: Drug use: No        Medications:      adalimumab (HUMIRA) 40 MG/0.8ML prefilled syringe kit   aspirin 81 MG tablet   atenolol (TENORMIN) 50 MG tablet   calcium-vitamin D (CALTRATE) 600-400 MG-UNIT per tablet   glipiZIDE (GLUCOTROL) 5 MG tablet   hydroxychloroquine (PLAQUENIL) 200 MG tablet   lisinopril (PRINIVIL/ZESTRIL) 20 MG tablet   metFORMIN (GLUCOPHAGE) 500 MG tablet   Multiple Vitamins-Minerals (MULTI-VITAMIN/MINERALS) TABS   predniSONE (DELTASONE) 5 MG tablet   simvastatin (ZOCOR) 40 MG tablet   tamsulosin (FLOMAX) 0.4 MG capsule         Review of Systems unable to obtain secondary to clinical condition...      Physical Exam   BP: 171/85  Pulse: 87  Temp: 98.4  F (36.9  C)  Resp: 16  Height: 182.9 cm (6')  Weight: 71.2 kg (156 lb 14.4 oz)  SpO2: 99 %      Physical Exam  Constitutional:  Well developed, well nourished.  Appears nontoxic and in no acute distress.  HENT:  Normocephalic and atraumatic.  Symmetric in appearance.  Eyes:  Conjunctivae are normal.  Neck:  Neck supple.  Cardiovascular:  No cyanosis.  RRR.  No audible murmurs noted.    Respiratory:  Effort normal without sign of respiratory distress.  CTAB without diminished regions.   Gastrointestinal:  Soft nondistended abdomen.  Superpubic tenderness with guarding.  No rigidity or rebound tenderness.  Negative Lackey's sign.  Negative McBurney's point.  No palpable pulsatile mass.   Genitourinary: Mildly eroded urethra due to catheterization, drip of blood noted at urethral meatus.  No obvious hydrocele, varicocele, testicular swelling or mass.    Musculoskeletal:  Moves extremities  spontaneously.  Neurological:  Patient is alert.  Skin:  Skin is warm and dry.  Psychiatric:  Normal mood and affect.      ED Course        Procedures              Critical Care time:  none               No results found for this or any previous visit (from the past 24 hour(s)).    Medications   lidocaine 2 % (URO-JET) jelly 10 mL (not administered)       Assessments & Plan (with Medical Decision Making)   Maxime Powell is a 85 year old male who presents to the department for evaluation of report of traumatic Murcia removal 2 days ago.  The patient's son eventually came to the department and explained that the patient had pulled out his Murcia catheter 2 evenings ago, it was replaced yesterday by unknown provider at the care facility but stopped draining today.  His Murcia catheter was advanced in the department and reinflated with relief of >500 cc urine, patient suprapubic pressure resolved.  It is possible there was some urethral injury from the previous trauma but no sign of hemorrhaging and Murcia catheter is functional.  Recommend he return to living facility for continued monitoring and management.  Son seems to be in agreement with discharge plan.      Disclaimer:  This note consists of symbols derived from keyboarding, dictation, and/or voice recognition software.  As a result, there may be errors in the script that have gone undetected.  Please consider this when interpreting information found in the chart.        I have reviewed the nursing notes.    I have reviewed the findings, diagnosis, plan and need for follow up with the patient.          Medication List      There are no discharge medications for this visit.         Final diagnoses:   Problem with Murcia catheter, initial encounter (H)       1/11/2019   [facility] EMERGENCY DEPARTMENT     Leonel Vogt DO  01/11/19 [id number]

## 2019-01-12 NOTE — ED NOTES
"RN to visit with Zuleyma--nursing staff from [facility]. Per her knowledge martinez was replaced \"either today or yesterday\" after her traumatically pulled it out with balloon inflated two days ago. Pt daughter in law was concerned due to his pain, possible trauma related to event and pt report of bleeding, thus ED evaluation was made per [facility]. Per Zuleyma she did not know anything in regards to output of recently replaced martinez and cannot verify output amounts.     Of note, patient currently reports he is comfortable. No further abdominal distention or pain. Martinez continues to drain dark bertha urine. Pt reports \"I have just been sleeping\".   "

## 2019-01-12 NOTE — ED TRIAGE NOTES
Pt martinez pulled out two days prior with baloon inflated. Martinez not replaced at that time. Pt complains of increased pain, some bleeding and inability to urinate since. Pt is from [facility], unsure if he truly has not voided since event. Pt reports pain 3/10 at penis. Pt does have abdominal distention and pressure in lower abdomen. Per MD replace martinez. Pt denies latex allergy.

## 2019-01-12 NOTE — ED NOTES
Pt son Maxime Sherry ZAPATA, is coming to to pick him to transport back to [facility].

## 2019-03-27 ENCOUNTER — HOSPITAL ENCOUNTER (INPATIENT)
Facility: CLINIC | Age: 84
LOS: 3 days | Discharge: NURSING, REHAB OR LONG-TERM CARE | DRG: 698 | End: 2019-03-30
Attending: FAMILY MEDICINE
Payer: COMMERCIAL

## 2019-03-27 ENCOUNTER — APPOINTMENT (OUTPATIENT)
Dept: GENERAL RADIOLOGY | Facility: CLINIC | Age: 84
DRG: 698 | End: 2019-03-27
Attending: FAMILY MEDICINE
Payer: COMMERCIAL

## 2019-03-27 ENCOUNTER — APPOINTMENT (OUTPATIENT)
Dept: GENERAL RADIOLOGY | Facility: CLINIC | Age: 84
DRG: 698 | End: 2019-03-27
Payer: COMMERCIAL

## 2019-03-27 DIAGNOSIS — T83.511A URINARY TRACT INFECTION ASSOCIATED WITH INDWELLING URETHRAL CATHETER, INITIAL ENCOUNTER (H): ICD-10-CM

## 2019-03-27 DIAGNOSIS — R65.21 SEPTIC SHOCK (H): ICD-10-CM

## 2019-03-27 DIAGNOSIS — E87.6 HYPOKALEMIA: Primary | ICD-10-CM

## 2019-03-27 DIAGNOSIS — M06.9 RHEUMATOID ARTHRITIS, INVOLVING UNSPECIFIED SITE, UNSPECIFIED RHEUMATOID FACTOR PRESENCE: ICD-10-CM

## 2019-03-27 DIAGNOSIS — R65.21 SEPTIC SHOCK DUE TO URINARY TRACT INFECTION (H): ICD-10-CM

## 2019-03-27 DIAGNOSIS — A41.9 SHOCK, SEPTIC (H): ICD-10-CM

## 2019-03-27 DIAGNOSIS — R65.21 SHOCK, SEPTIC (H): ICD-10-CM

## 2019-03-27 DIAGNOSIS — Z96.0 PRESENCE OF INDWELLING URINARY CATHETER: ICD-10-CM

## 2019-03-27 DIAGNOSIS — N39.0 URINARY TRACT INFECTION ASSOCIATED WITH INDWELLING URETHRAL CATHETER, INITIAL ENCOUNTER (H): ICD-10-CM

## 2019-03-27 DIAGNOSIS — N39.0 URINARY TRACT INFECTION WITHOUT HEMATURIA, SITE UNSPECIFIED: ICD-10-CM

## 2019-03-27 DIAGNOSIS — I95.9 HYPOTENSION, UNSPECIFIED HYPOTENSION TYPE: ICD-10-CM

## 2019-03-27 DIAGNOSIS — A41.9 SEPTIC SHOCK (H): ICD-10-CM

## 2019-03-27 DIAGNOSIS — N39.0 SEPTIC SHOCK DUE TO URINARY TRACT INFECTION (H): ICD-10-CM

## 2019-03-27 DIAGNOSIS — A41.9 SEPTIC SHOCK DUE TO URINARY TRACT INFECTION (H): ICD-10-CM

## 2019-03-27 PROBLEM — I48.91 ATRIAL FIBRILLATION WITH RAPID VENTRICULAR RESPONSE (H): Status: ACTIVE | Noted: 2019-03-27

## 2019-03-27 PROBLEM — D84.9 IMMUNOSUPPRESSED STATUS (H): Status: ACTIVE | Noted: 2019-03-27

## 2019-03-27 PROBLEM — R65.20 ACUTE RENAL INJURY DUE TO SEPSIS (H): Status: ACTIVE | Noted: 2019-03-27

## 2019-03-27 PROBLEM — N17.9 ACUTE RENAL INJURY DUE TO SEPSIS (H): Status: ACTIVE | Noted: 2019-03-27

## 2019-03-27 LAB
ALBUMIN SERPL-MCNC: 3.2 G/DL (ref 3.4–5)
ALBUMIN UR-MCNC: 100 MG/DL
ALP SERPL-CCNC: 68 U/L (ref 40–150)
ALT SERPL W P-5'-P-CCNC: 18 U/L (ref 0–70)
ANION GAP SERPL CALCULATED.3IONS-SCNC: 11 MMOL/L (ref 3–14)
APPEARANCE UR: ABNORMAL
AST SERPL W P-5'-P-CCNC: 19 U/L (ref 0–45)
BACTERIA #/AREA URNS HPF: ABNORMAL /HPF
BASOPHILS # BLD AUTO: 0 10E9/L (ref 0–0.2)
BASOPHILS NFR BLD AUTO: 0.1 %
BILIRUB SERPL-MCNC: 0.5 MG/DL (ref 0.2–1.3)
BILIRUB UR QL STRIP: NEGATIVE
BUN SERPL-MCNC: 31 MG/DL (ref 7–30)
CALCIUM SERPL-MCNC: 8.7 MG/DL (ref 8.5–10.1)
CHLORIDE SERPL-SCNC: 109 MMOL/L (ref 94–109)
CO2 SERPL-SCNC: 22 MMOL/L (ref 20–32)
COLOR UR AUTO: YELLOW
CREAT SERPL-MCNC: 1.65 MG/DL (ref 0.66–1.25)
DIFFERENTIAL METHOD BLD: ABNORMAL
EOSINOPHIL # BLD AUTO: 0 10E9/L (ref 0–0.7)
EOSINOPHIL NFR BLD AUTO: 0 %
ERYTHROCYTE [DISTWIDTH] IN BLOOD BY AUTOMATED COUNT: 14.9 % (ref 10–15)
FLUAV+FLUBV AG SPEC QL: NEGATIVE
FLUAV+FLUBV AG SPEC QL: NEGATIVE
GFR SERPL CREATININE-BSD FRML MDRD: 37 ML/MIN/{1.73_M2}
GLUCOSE BLDC GLUCOMTR-MCNC: 146 MG/DL (ref 70–99)
GLUCOSE SERPL-MCNC: 142 MG/DL (ref 70–99)
GLUCOSE UR STRIP-MCNC: NEGATIVE MG/DL
HCT VFR BLD AUTO: 36.7 % (ref 40–53)
HGB BLD-MCNC: 11.6 G/DL (ref 13.3–17.7)
HGB UR QL STRIP: ABNORMAL
IMM GRANULOCYTES # BLD: 0.1 10E9/L (ref 0–0.4)
IMM GRANULOCYTES NFR BLD: 0.8 %
KETONES UR STRIP-MCNC: NEGATIVE MG/DL
LACTATE BLD-SCNC: 3.5 MMOL/L (ref 0.7–2)
LACTATE BLD-SCNC: 7.2 MMOL/L (ref 0.7–2)
LEUKOCYTE ESTERASE UR QL STRIP: ABNORMAL
LYMPHOCYTES # BLD AUTO: 0.2 10E9/L (ref 0.8–5.3)
LYMPHOCYTES NFR BLD AUTO: 1.5 %
MCH RBC QN AUTO: 31.4 PG (ref 26.5–33)
MCHC RBC AUTO-ENTMCNC: 31.6 G/DL (ref 31.5–36.5)
MCV RBC AUTO: 100 FL (ref 78–100)
MONOCYTES # BLD AUTO: 0.1 10E9/L (ref 0–1.3)
MONOCYTES NFR BLD AUTO: 0.9 %
MRSA DNA SPEC QL NAA+PROBE: NEGATIVE
NEUTROPHILS # BLD AUTO: 10.1 10E9/L (ref 1.6–8.3)
NEUTROPHILS NFR BLD AUTO: 96.7 %
NITRATE UR QL: NEGATIVE
NRBC # BLD AUTO: 0 10*3/UL
NRBC BLD AUTO-RTO: 0 /100
PH UR STRIP: 6 PH (ref 5–7)
PLATELET # BLD AUTO: 150 10E9/L (ref 150–450)
POTASSIUM SERPL-SCNC: 4.2 MMOL/L (ref 3.4–5.3)
PROT SERPL-MCNC: 6.6 G/DL (ref 6.8–8.8)
RBC # BLD AUTO: 3.69 10E12/L (ref 4.4–5.9)
RBC #/AREA URNS AUTO: 152 /HPF (ref 0–2)
SODIUM SERPL-SCNC: 142 MMOL/L (ref 133–144)
SOURCE: ABNORMAL
SP GR UR STRIP: 1.01 (ref 1–1.03)
SPECIMEN SOURCE: NORMAL
SPECIMEN SOURCE: NORMAL
UROBILINOGEN UR STRIP-MCNC: 0 MG/DL (ref 0–2)
WBC # BLD AUTO: 10.4 10E9/L (ref 4–11)
WBC #/AREA URNS AUTO: >182 /HPF (ref 0–5)
WBC CLUMPS #/AREA URNS HPF: PRESENT /HPF

## 2019-03-27 PROCEDURE — 87640 STAPH A DNA AMP PROBE: CPT

## 2019-03-27 PROCEDURE — 99291 CRITICAL CARE FIRST HOUR: CPT | Mod: 25 | Performed by: FAMILY MEDICINE

## 2019-03-27 PROCEDURE — 93005 ELECTROCARDIOGRAM TRACING: CPT | Performed by: FAMILY MEDICINE

## 2019-03-27 PROCEDURE — 25800030 ZZH RX IP 258 OP 636: Performed by: FAMILY MEDICINE

## 2019-03-27 PROCEDURE — 81001 URINALYSIS AUTO W/SCOPE: CPT | Performed by: FAMILY MEDICINE

## 2019-03-27 PROCEDURE — 25000125 ZZHC RX 250: Performed by: FAMILY MEDICINE

## 2019-03-27 PROCEDURE — 87086 URINE CULTURE/COLONY COUNT: CPT | Performed by: FAMILY MEDICINE

## 2019-03-27 PROCEDURE — 99291 CRITICAL CARE FIRST HOUR: CPT

## 2019-03-27 PROCEDURE — 25000132 ZZH RX MED GY IP 250 OP 250 PS 637: Mod: GY | Performed by: FAMILY MEDICINE

## 2019-03-27 PROCEDURE — 80053 COMPREHEN METABOLIC PANEL: CPT | Performed by: FAMILY MEDICINE

## 2019-03-27 PROCEDURE — 71045 X-RAY EXAM CHEST 1 VIEW: CPT | Mod: 76

## 2019-03-27 PROCEDURE — 36415 COLL VENOUS BLD VENIPUNCTURE: CPT | Performed by: FAMILY MEDICINE

## 2019-03-27 PROCEDURE — 87077 CULTURE AEROBIC IDENTIFY: CPT | Performed by: FAMILY MEDICINE

## 2019-03-27 PROCEDURE — 96365 THER/PROPH/DIAG IV INF INIT: CPT | Performed by: FAMILY MEDICINE

## 2019-03-27 PROCEDURE — A9270 NON-COVERED ITEM OR SERVICE: HCPCS | Mod: GY | Performed by: FAMILY MEDICINE

## 2019-03-27 PROCEDURE — 87088 URINE BACTERIA CULTURE: CPT | Performed by: FAMILY MEDICINE

## 2019-03-27 PROCEDURE — 96366 THER/PROPH/DIAG IV INF ADDON: CPT | Performed by: FAMILY MEDICINE

## 2019-03-27 PROCEDURE — 93010 ELECTROCARDIOGRAM REPORT: CPT | Mod: Z6 | Performed by: FAMILY MEDICINE

## 2019-03-27 PROCEDURE — 25000128 H RX IP 250 OP 636: Performed by: FAMILY MEDICINE

## 2019-03-27 PROCEDURE — 25000128 H RX IP 250 OP 636

## 2019-03-27 PROCEDURE — 96361 HYDRATE IV INFUSION ADD-ON: CPT | Performed by: FAMILY MEDICINE

## 2019-03-27 PROCEDURE — 85025 COMPLETE CBC W/AUTO DIFF WBC: CPT | Performed by: FAMILY MEDICINE

## 2019-03-27 PROCEDURE — 96375 TX/PRO/DX INJ NEW DRUG ADDON: CPT | Performed by: FAMILY MEDICINE

## 2019-03-27 PROCEDURE — 96360 HYDRATION IV INFUSION INIT: CPT | Mod: 59 | Performed by: FAMILY MEDICINE

## 2019-03-27 PROCEDURE — 71045 X-RAY EXAM CHEST 1 VIEW: CPT

## 2019-03-27 PROCEDURE — 40000986 XR CHEST PORT 1 VW

## 2019-03-27 PROCEDURE — 83605 ASSAY OF LACTIC ACID: CPT | Performed by: FAMILY MEDICINE

## 2019-03-27 PROCEDURE — 20000003 ZZH R&B ICU

## 2019-03-27 PROCEDURE — 87040 BLOOD CULTURE FOR BACTERIA: CPT | Performed by: FAMILY MEDICINE

## 2019-03-27 PROCEDURE — 00000146 ZZHCL STATISTIC GLUCOSE BY METER IP

## 2019-03-27 PROCEDURE — 36569 INSJ PICC 5 YR+ W/O IMAGING: CPT

## 2019-03-27 PROCEDURE — 87804 INFLUENZA ASSAY W/OPTIC: CPT | Performed by: FAMILY MEDICINE

## 2019-03-27 PROCEDURE — 27211068 ZZH TRAY POWER PICC SOLO 5FR DUAL LUMEN

## 2019-03-27 PROCEDURE — 87641 MR-STAPH DNA AMP PROBE: CPT

## 2019-03-27 PROCEDURE — 87186 SC STD MICRODIL/AGAR DIL: CPT | Performed by: FAMILY MEDICINE

## 2019-03-27 PROCEDURE — 99285 EMERGENCY DEPT VISIT HI MDM: CPT | Mod: 25 | Performed by: FAMILY MEDICINE

## 2019-03-27 RX ORDER — HEPARIN SODIUM 5000 [USP'U]/.5ML
5000 INJECTION, SOLUTION INTRAVENOUS; SUBCUTANEOUS EVERY 8 HOURS
Status: DISCONTINUED | OUTPATIENT
Start: 2019-03-27 | End: 2019-03-28

## 2019-03-27 RX ORDER — LIDOCAINE 40 MG/G
CREAM TOPICAL
Status: DISCONTINUED | OUTPATIENT
Start: 2019-03-27 | End: 2019-03-30 | Stop reason: HOSPADM

## 2019-03-27 RX ORDER — POLYETHYLENE GLYCOL 3350 17 G/17G
17 POWDER, FOR SOLUTION ORAL DAILY PRN
Status: DISCONTINUED | OUTPATIENT
Start: 2019-03-27 | End: 2019-03-30 | Stop reason: HOSPADM

## 2019-03-27 RX ORDER — NALOXONE HYDROCHLORIDE 0.4 MG/ML
.1-.4 INJECTION, SOLUTION INTRAMUSCULAR; INTRAVENOUS; SUBCUTANEOUS
Status: DISCONTINUED | OUTPATIENT
Start: 2019-03-27 | End: 2019-03-30 | Stop reason: HOSPADM

## 2019-03-27 RX ORDER — ONDANSETRON 2 MG/ML
4 INJECTION INTRAMUSCULAR; INTRAVENOUS EVERY 6 HOURS PRN
Status: DISCONTINUED | OUTPATIENT
Start: 2019-03-27 | End: 2019-03-30 | Stop reason: HOSPADM

## 2019-03-27 RX ORDER — PROCHLORPERAZINE MALEATE 5 MG
5 TABLET ORAL EVERY 6 HOURS PRN
Status: DISCONTINUED | OUTPATIENT
Start: 2019-03-27 | End: 2019-03-30 | Stop reason: HOSPADM

## 2019-03-27 RX ORDER — SODIUM CHLORIDE, SODIUM LACTATE, POTASSIUM CHLORIDE, CALCIUM CHLORIDE 600; 310; 30; 20 MG/100ML; MG/100ML; MG/100ML; MG/100ML
INJECTION, SOLUTION INTRAVENOUS CONTINUOUS
Status: DISCONTINUED | OUTPATIENT
Start: 2019-03-27 | End: 2019-03-30

## 2019-03-27 RX ORDER — AMOXICILLIN 250 MG
1 CAPSULE ORAL 2 TIMES DAILY PRN
Status: DISCONTINUED | OUTPATIENT
Start: 2019-03-27 | End: 2019-03-30 | Stop reason: HOSPADM

## 2019-03-27 RX ORDER — ONDANSETRON 4 MG/1
4 TABLET, ORALLY DISINTEGRATING ORAL EVERY 6 HOURS PRN
Status: DISCONTINUED | OUTPATIENT
Start: 2019-03-27 | End: 2019-03-30 | Stop reason: HOSPADM

## 2019-03-27 RX ORDER — PROCHLORPERAZINE 25 MG
12.5 SUPPOSITORY, RECTAL RECTAL EVERY 12 HOURS PRN
Status: DISCONTINUED | OUTPATIENT
Start: 2019-03-27 | End: 2019-03-30 | Stop reason: HOSPADM

## 2019-03-27 RX ORDER — HYDROMORPHONE HCL/0.9% NACL/PF 0.2MG/0.2
0.2 SYRINGE (ML) INTRAVENOUS
Status: DISCONTINUED | OUTPATIENT
Start: 2019-03-27 | End: 2019-03-30 | Stop reason: HOSPADM

## 2019-03-27 RX ORDER — AMOXICILLIN 250 MG
2 CAPSULE ORAL 2 TIMES DAILY PRN
Status: DISCONTINUED | OUTPATIENT
Start: 2019-03-27 | End: 2019-03-30 | Stop reason: HOSPADM

## 2019-03-27 RX ORDER — SODIUM CHLORIDE 9 MG/ML
INJECTION, SOLUTION INTRAVENOUS CONTINUOUS
Status: DISCONTINUED | OUTPATIENT
Start: 2019-03-27 | End: 2019-03-27

## 2019-03-27 RX ORDER — ACETAMINOPHEN 650 MG/1
650 SUPPOSITORY RECTAL ONCE
Status: COMPLETED | OUTPATIENT
Start: 2019-03-27 | End: 2019-03-27

## 2019-03-27 RX ORDER — CLOTRIMAZOLE 1 %
CREAM (GRAM) TOPICAL 2 TIMES DAILY
Status: ON HOLD | COMMUNITY
End: 2019-09-22

## 2019-03-27 RX ORDER — MIRTAZAPINE 15 MG/1
7.5 TABLET, FILM COATED ORAL AT BEDTIME
COMMUNITY

## 2019-03-27 RX ORDER — HEPARIN SODIUM,PORCINE 10 UNIT/ML
2-5 VIAL (ML) INTRAVENOUS
Status: DISCONTINUED | OUTPATIENT
Start: 2019-03-27 | End: 2019-03-30 | Stop reason: HOSPADM

## 2019-03-27 RX ORDER — SODIUM CHLORIDE, SODIUM LACTATE, POTASSIUM CHLORIDE, CALCIUM CHLORIDE 600; 310; 30; 20 MG/100ML; MG/100ML; MG/100ML; MG/100ML
1000 INJECTION, SOLUTION INTRAVENOUS CONTINUOUS
Status: DISCONTINUED | OUTPATIENT
Start: 2019-03-27 | End: 2019-03-27

## 2019-03-27 RX ADMIN — HYDROCORTISONE SODIUM SUCCINATE 50 MG: 100 INJECTION, POWDER, FOR SOLUTION INTRAMUSCULAR; INTRAVENOUS at 15:56

## 2019-03-27 RX ADMIN — NOREPINEPHRINE BITARTRATE 0.03 MCG/KG/MIN: 1 INJECTION INTRAVENOUS at 15:27

## 2019-03-27 RX ADMIN — TAZOBACTAM SODIUM AND PIPERACILLIN SODIUM 3.38 G: 375; 3 INJECTION, SOLUTION INTRAVENOUS at 19:24

## 2019-03-27 RX ADMIN — SODIUM CHLORIDE, POTASSIUM CHLORIDE, SODIUM LACTATE AND CALCIUM CHLORIDE 500 ML: 600; 310; 30; 20 INJECTION, SOLUTION INTRAVENOUS at 12:50

## 2019-03-27 RX ADMIN — TAZOBACTAM SODIUM AND PIPERACILLIN SODIUM 3.38 G: 375; 3 INJECTION, SOLUTION INTRAVENOUS at 13:12

## 2019-03-27 RX ADMIN — HYDROCORTISONE SODIUM SUCCINATE 50 MG: 100 INJECTION, POWDER, FOR SOLUTION INTRAMUSCULAR; INTRAVENOUS at 18:24

## 2019-03-27 RX ADMIN — HEPARIN SODIUM 5000 UNITS: 5000 INJECTION, SOLUTION INTRAVENOUS; SUBCUTANEOUS at 18:24

## 2019-03-27 RX ADMIN — SODIUM CHLORIDE, POTASSIUM CHLORIDE, SODIUM LACTATE AND CALCIUM CHLORIDE 1000 ML: 600; 310; 30; 20 INJECTION, SOLUTION INTRAVENOUS at 15:16

## 2019-03-27 RX ADMIN — ACETAMINOPHEN 650 MG: 650 SUPPOSITORY RECTAL at 12:27

## 2019-03-27 RX ADMIN — SODIUM CHLORIDE, POTASSIUM CHLORIDE, SODIUM LACTATE AND CALCIUM CHLORIDE: 600; 310; 30; 20 INJECTION, SOLUTION INTRAVENOUS at 15:17

## 2019-03-27 RX ADMIN — SODIUM CHLORIDE 1000 ML: 9 INJECTION, SOLUTION INTRAVENOUS at 12:27

## 2019-03-27 ASSESSMENT — ACTIVITIES OF DAILY LIVING (ADL): ADLS_ACUITY_SCORE: 23

## 2019-03-27 NOTE — ED TRIAGE NOTES
Pt arrives via [facility] following no urinary output since last night, fever started today. Lives in [facility]. Murcia cath in place. Patient placed on EKG monitor, pulse oximeter and blood pressure cuff.

## 2019-03-27 NOTE — PROGRESS NOTES
Progress Note - Change in Code Status    The patient's son, Cheri, who also advises me that he has power of  for health care for his father, has arrived to the intensive care unit to visit the patient.  Cheri has requested the ICU RN to contact me, to advise me that the patient is to be considered a full CODE STATUS, and that the DO NOT RESUSCITATE/DO NOT INTUBATE order is no longer in effect.  I will make that change.    Anoml Miller MD

## 2019-03-27 NOTE — ED NOTES
Martinez cath in place upon arrival. Large thick urine with sediment and odor in place. Minimal urine output, appear tube is clogged above the flush valve. Balloon deflated, removed. Pt immediately appeared more comfortable. Less fidgety, appears more calm. Using sterile technique replaced martinez with immediate output.

## 2019-03-27 NOTE — H&P
[facility]    History and Physical  Hospital Medicine       Date of Admission:  3/27/2019  Date of Service: 3/27/2019     Assessment & Plan   Maxime Powell is a 85 year old male who presents on 3/27/2019 in septic shock.    Principal Problem:    Septic shock due to urinary tract infection (H) -transferred from [facility] to the [facility] emergency department with a temperature of 102.1, heart rate as high as 130, respiratory rate 28, and initial blood pressure 150/84.  However, blood pressure has declined, presently not in the 70s systolic range, being started on a norepinephrine continuous infusion.  Initial lactate was 7.2.  Etiology appears to be urinary tract infection.  -Zosyn IV started empirically in the emergency department.  -Normal saline 3.1 L has been administered in the emergency department thus far.  -The patient is on prednisone for the treatment of rheumatoid arthritis.  I ordered hydrocortisone 50 mg IV x1, and will continue this every 8 hours routinely until shock improved.  -See treatment of urinary tract infection below.  -Prognosis is guarded for recovery given severity of shock state and current evidence for multisystem organ failure.    Active Problems:    Urinary tract infection associated with indwelling urinary catheter (H) -the patient has a chronic indwelling urinary catheter, apparently present due to sequela of prostate cancer.  He has had prior urinary tract infection.  The 2 previous cultures have grown a pansensitive Pseudomonas.  A culture in October 2018 grew enterococcus, ampicillin sensitive.  UA here shows substantial bacteriuria and pyuria.  Urine and blood cultures are sent and are pending.  -See treatment of associated septic shock above.  -Continue empiric Zosyn.  -Follow urine and blood cultures to completion.      Diabetes mellitus, type 2 (H) -no previous hemoglobin A1c is found.  Outpatient regimen appears to be  "limited to metformin 500 mg p.o. twice daily.  -Discontinue metformin due to shock and lactic acidosis.  -Begin sliding scale insulin, moderate dose.  -If he remains hyperglycemic, will add NPH starting tomorrow morning.      Hypertension -managed as an outpatient with lisinopril 10 mg PO every day.  He is hypotensive here due to septic shock.  -Hold lisinopril.      Rheumatoid arthritis (H),   Immunosuppressed status (H) -outpatient regimen includes hydroxychloroquine, prednisone 7.5 mg p.o. daily, and although it is not on his med list from assisted living, the patient's son says that he is also on methotrexate, dose unknown.  I see no active inflamed joints on examination.  -\"Stress\" steroids with hydrocortisone are being given due to shock, details above.  -Hydroxychloroquine and methotrexate will be held until the patient is able to take oral medications.      Acute renal injury due to sepsis (H) -very few labs are available for review, though it appears that the patient's baseline GFR is 71-73.  GFR on arrival here is 37.  This probably represents acute kidney injury due to hypovolemia from septic shock.  -See above regarding fluid resuscitation thus far.  -All nephrotoxins will be held, including lisinopril.  -Dose medications appropriate to renal function.      Atrial fibrillation with rapid ventricular response (H) -admission EKG shows atrial fibrillation with rapid ventricular response.  No acute ST or T wave changes are suggested.  The patient's medical history obtained from his assisted living facility does not describe atrial fibrillation as a prior diagnosis.  This is probably new, due to septic shock.  -No rate control for now, as tachycardia is an appropriate response to the shock state.  -We will not begin systemic anticoagulation tonight.  If atrial fibrillation persists through to tomorrow, and if it appears that the patient will recover from this illness, anticoagulation could be " discussed.    DVT Prophylaxis: Heparin SQ  Code Status: DNR / DNI.  A POLST accompanied this patient from his assisted living facility.  It is dated 7/9/18, and is signed by the patient's son, who is also the patient's healthcare agent, Maximemaria elena Powell III. this document indicates that the patient is DO NOT RESUSCITATE/DO NOT INTUBATE status, and goes on to say that he is comfort care.  I contacted the patient's son to discuss this.  The patient's son does not recall witnessing or signing this POLST.  He says that there is an advanced directive that he helped complete as the patient's healthcare agent, that should supersede this POLST.  According to the patient's son, this document states that, although the patient is indeed DO NOT RESUSCITATE/DO NOT INTUBATE status, he should be treated for potential reversible conditions, including septic shock.  Treatment should include pressor therapy if needed, and the patient's son gives consent for the placement of a PICC line for delivery of medications, fluids, and pressors.  We will follow this directive.    Lines: Peripheral, though PICC is ordered.  Murcia catheter: Chronic.  Restraints: None.    Disposition: Anticipate discharge in 3 to 5 day(s). Appropriate for Inpatient, ICU care.    Attestation:   Time in: 1524  Time out: 1644  Critical Care time: 80 minutes.    Anmol Miller MD          Primary Care Physician   Center, [facility] None    Past Medical History      Past Medical History:   Diagnosis Date     Atherosclerotic heart disease of native coronary artery without angina pectoris     RCA stenting 2001     Essential (primary) hypertension     No Comments Provided     Hyperlipidemia     No Comments Provided     Mononeuropathy of left lower extremity     R/T diabetes     Osteoarthritis     No Comments Provided     Personal history of malignant neoplasm of prostate     1987,radiation     RA (rheumatoid arthritis) (H)      Rheumatoid arthritis (H)      1996     Sjogren-Sahwin syndrome      Strain of muscle, fascia and tendon of long head of biceps, unspecified arm, initial encounter     2003,right     Type 2 diabetes mellitus without complications (H)     2002,Type II     Patient Active Problem List    Diagnosis Date Noted     Septic shock due to urinary tract infection (H) 03/27/2019     Priority: Medium     Urinary tract infection associated with indwelling urinary catheter (H) 03/27/2019     Priority: Medium     Acute renal injury due to sepsis (H) 03/27/2019     Priority: Medium     Immunosuppressed status (H) 03/27/2019     Priority: Medium     Atrial fibrillation with rapid ventricular response (H) 03/27/2019     Priority: Medium     Coronary atherosclerosis 01/31/2018     Priority: Medium     Cough 01/31/2018     Priority: Medium     Elevated prostate specific antigen (PSA) 01/31/2018     Priority: Medium     Lumbago 01/31/2018     Priority: Medium     Migraine headache 01/31/2018     Priority: Medium     Nocturia 01/31/2018     Priority: Medium     Olecranon bursitis 01/31/2018     Priority: Medium     Other symptoms involving digestive system(787.99) 01/31/2018     Priority: Medium     Pain in joint, shoulder region 01/31/2018     Priority: Medium     Urinary frequency 01/31/2018     Priority: Medium     Aftercare following surgery of the musculoskeletal system 11/04/2014     Priority: Medium     Acute urinary retention 05/07/2014     Priority: Medium     ASCVD (arteriosclerotic cardiovascular disease) 05/07/2014     Priority: Medium     Overview:   S/p RCA stenting 2001       Diabetes mellitus, type 2 (H) 05/07/2014     Priority: Medium     Hyperlipidemia 05/07/2014     Priority: Medium     Hypertension 05/07/2014     Priority: Medium     Rheumatoid arthritis (H) 05/07/2014     Priority: Medium     Osteoarthritis of glenohumeral joint 02/13/2014     Priority: Medium     Prostate cancer (H) 08/27/2013     Priority: Medium     Overview:   S/p radition  tx 1987 in [place]           Past Surgical History     Past Surgical History:   Procedure Laterality Date     APPENDECTOMY OPEN      1960     ARTHROSCOPY SHOULDER ROTATOR CUFF REPAIR      2002,right, Jim Chanel MD     ARTHROTOMY WRIST      1986     COLONOSCOPY      2014     HEART CATH, ANGIOPLASTY      2001,right coronary artery     OTHER SURGICAL HISTORY      05/07/2014,[id number],SHOULDER REPLACEMENT,Right,reverse     OTHER SURGICAL HISTORY      65715.0,NM BONE SCAN 3 PHASE,09/06/13 negative for  metastases     OTHER SURGICAL HISTORY      932739,OTHER,no evidence of metastases     OTHER SURGICAL HISTORY      291168,OTHER     OTHER SURGICAL HISTORY      355548,OTHER,right        History is obtained from the patient's son, Cheri, a few documents sent over from assisted living, and review of old records via the EMR.    History of Present Illness   Maxime Powell is a 85 year old male with the above past medical history now presents on 3/27/2019 in septic shock.    The patient is a resident of [facility].  He is on the memory care unit at that facility, according to my conversation with the patient's son, Cheri.  The patient also has a chronic, indwelling Murcia catheter, apparently has a sequela of previous prostate cancer.  The patient has had previous urinary tract infection.  His 2 most recent positive urine cultures grew Pseudomonas, pansensitive.  An earlier culture grew Enterococcus faecalis, ampicillin sensitive.    I do not have information as to the circumstances at [facility] today that led up to the patient's transport to [facility] emergency department.  However, on arrival at the emergency department, the patient was in sepsis syndrome, with a temperature of 102.1, tachycardia, tachypnea, and since arrival, has had a declining blood pressure, currently with a blood pressure systolic in the 70s, on a norepinephrine infusion.    The patient is obtunded, minimally responsive  to my examination, and not a source of verbal history.  The remaining history above was obtained from the medical record and from my discussion with the patient's son.      Review of Systems   No verbal history is obtainable from the patient himself due to obtundation.  The patient's son describes that he has been in his usual state of health recently, and offers no positives or negatives currently.    Prior to Admission Medications   Prior to Admission Medications   Prescriptions Last Dose Informant Patient Reported? Taking?   Artificial Saliva (BIOTENE MOISTURIZING MOUTH MT) 3/27/2019 at [address] [place] [address] Yes Yes   Sig: Take 1 spray by mouth 2 times daily   Multiple Vitamins-Minerals (ICAPS AREDS 2) CAPS 3/27/2019 at [address] [place] [address] Yes Yes   Sig: Take 1 capsule by mouth every morning   adalimumab (HUMIRA) 40 MG/0.8ML prefilled syringe kit Unknown at Unknown time [facility] Yes No   Sig: Inject 40 mg Subcutaneous every 14 days   clotrimazole (LOTRIMIN) 1 % external cream 3/27/2019 at [address] [place] [address] Yes Yes   Sig: Apply topically 2 times daily To penis until healed   hydroxychloroquine (PLAQUENIL) 200 MG tablet 3/27/2019 at [address] [place] [address] Yes Yes   Sig: Take 200 mg by mouth every morning    lisinopril (PRINIVIL/ZESTRIL) 20 MG tablet 3/27/2019 at [address] [place] [address] Yes Yes   Sig: Take 10 mg by mouth every morning    metFORMIN (GLUCOPHAGE) 500 MG tablet 3/27/2019 at [address] [place] [address] Yes Yes   Sig: Take One tablet (500 mg) by mouth twice daily   mirtazapine (REMERON) 15 MG tablet 3/26/2019 at [address] [place] [address] Yes Yes   Sig: Take 7.5 mg by mouth At Bedtime   predniSONE (DELTASONE) 5 MG tablet 3/27/2019 at [address] [place] [address] Yes Yes   Sig: Take 7.5 mg by mouth daily (with breakfast) .   tamsulosin (FLOMAX) 0.4 MG capsule 3/26/2019 at [address] [facility] Yes Yes   Sig: Take 0.8 mg by mouth At Bedtime       Facility-Administered Medications: None     Allergies   Allergies   Allergen Reactions     Finasteride       Other reaction(s): Gynecomastia  Required tamoxifen treatment     Penicillins      Other reaction(s): Edema, Erythema     Gold Rash     Injectable gold       Family History    Family History   Problem Relation Age of Onset     Cancer Father         Cancer,throat cancer     Breast Cancer Sister         Cancer-breast       Social History   Social History     Socioeconomic History     Marital status:      Spouse name: Not on file     Number of children: Not on file     Years of education: Not on file     Highest education level: Not on file   Occupational History     Not on file   Social Needs     Financial resource strain: Not on file     Food insecurity:     Worry: Not on file     Inability: Not on file     Transportation needs:     Medical: Not on file     Non-medical: Not on file   Tobacco Use     Smoking status: Former Smoker     Years: 30.00     Types: Cigarettes     Last attempt to quit: 1980     Years since quittin.2     Smokeless tobacco: Former User     Types: Chew     Quit date: 2014     Tobacco comment: Quit smoking: quit smoking 33 years ago. Chew's daily   Substance and Sexual Activity     Alcohol use: No     Drug use: No     Types: Other     Comment: Drug use: No     Sexual activity: No   Lifestyle     Physical activity:     Days per week: Not on file     Minutes per session: Not on file     Stress: Not on file   Relationships     Social connections:     Talks on phone: Not on file     Gets together: Not on file     Attends Gnosticism service: Not on file     Active member of club or organization: Not on file     Attends meetings of clubs or organizations: Not on file     Relationship status: Not on file     Intimate partner violence:     Fear of current or ex partner: Not on file     Emotionally abused: Not on file     Physically abused: Not on file     Forced sexual activity: Not on file   Other Topics Concern     Not on file   Social History Narrative    3 living children, daughter  , oldest sone saray/Hodgkins. Was in [employer]. , wife - (Clarissa)       Physical Exam   BP (!) 72/52   Pulse 115   Temp 102.1  F (38.9  C) (Axillary)   Resp 23   Wt 77.1 kg (170 lb)   SpO2 92%   BMI 23.06 kg/m       Weight: 170 lbs 0 oz Body mass index is 23.06 kg/m .     GENERAL: Slender, somewhat disheveled appearing man, not responsive to my voice or exam.  EYES: Eyes grossly normal to limited inspection.  HENT: Nasal airway in place.  Nasal mucosa normal, no discharge.    NECK: Trachea midline, no stridor.    RESP: No accessory muscle use.  Prominent Cheyne-Rodriguez respiratory pattern.  Symmetrical breath sounds.  Lungs clear throughout on inspiration and expiration.  Expiration not prolonged, no wheeze.  CV: Irregularly irregular, tachycardic.  Normal S1 S2, no murmur or extra sound.  Trace bilateral lower extremity edema.  ABDOMEN: Soft, no guarding.  Liver and spleen not enlarged, no masses palpable.  Bowel sounds positive.  MS: No bony deformities noted.  No red or inflamed joints.  SKIN: Warm and dry, no rashes.  NEURO: No response to my voice or exam, non-conversant.  No facial asymmetry seen.  No spontaneous movement of any extremity.  PSYCH: Deferred in this obtunded patient.      Data   Data reviewed today:   Recent Labs   Lab 19  1226   WBC 10.4   HGB 11.6*            POTASSIUM 4.2   CHLORIDE 109   CO2 22   BUN 31*   CR 1.65*   ANIONGAP 11   LAISHA 8.7   *   ALBUMIN 3.2*   PROTTOTAL 6.6*   BILITOTAL 0.5   ALKPHOS 68   ALT 18   AST 19       Recent Results (from the past 24 hour(s))   XR Chest Port 1 View    Narrative    CHEST PORTABLE ONE VIEW 2019 1:32 PM     HISTORY: Fever.      Impression    IMPRESSION: Interstitial prominence likely representing chronic  fibrosis. No pulmonary consolidation is demonstrated. Right  glenohumeral joint reverse arthroplasty is noted.    RICKY SANTILLAN MD       I personally reviewed the EKG tracing showing atrial  fibrillation with rapid ventricular response, and no acutge St or T changes,  and the chest x-ray image(s) showing clear lung fields bilaterally.    Anmol Miller MD

## 2019-03-27 NOTE — LETTER
Transition Communication Hand-off for Care Transitions to Next Level of Care Provider    Name: Maxime Powell  : 10/26/1933  MRN #: [id number]  Primary Care Provider: [facility]     Primary Clinic: [facility] [address]  Primary Care Clinic Name: [facility]  Reason for Hospitalization:  Septic shock (H) [A41.9, R65.21]  Urinary tract infection without hematuria, site unspecified [N39.0]  Admit Date/Time: 3/27/2019 12:00 PM  Discharge Date: 3/30/19  Payor Source: Payor: UCARE / Plan: UCARE FOR S[employer]IORS [facility]/NON [employer] PARTNERS / Product Type: HMO /     Readmission Assessment Measure (J LUIS) Risk Score/category: average           Reason for Communication Hand-off Referral: Fragility    Discharge Plan:return to [facility]       Concern for non-adherence with plan of care:   Y/N no  Follow-up specialty is recommended: No    Follow-up plan:  No future appointments.    Key Recommendations:  Pt will return to [facility] (phone: [phone] Fax: [phone]) when stable with [facility] (phone: [phone] Fax: [phone]).  Pts son is supportive.     Johanna Reynaga MSW, LICSW, [phone]    AVS/Discharge Summary is the source of truth; this is a helpful guide for improved communication of patient story

## 2019-03-27 NOTE — PROGRESS NOTES
[facility] [facility] [place]MISSION NOTE    Patient admitted to room ICU room 1001 at approximately 1700 via cart from emergency room. Patient was accompanied by RN and son's S.O.     Verbal SBAR report received from Reny NICOLAS prior to patient arrival.     Patient trasferred to bed via air osmar. Patient alert and oriented X Pt lethargic/sleepy and not answering most questions. The patient is not having any pain.  . Admission vital signs: Blood pressure 101/50, pulse 118, temperature 99.3  F (37.4  C), temperature source Oral, resp. rate 17, weight 72.8 kg (160 lb 7.9 oz), SpO2 97 %. Patient and son's S.O. were oriented to plan of care, call light, bed controls, tv, telephone, bathroom and visiting hours.     Risk Assessment     The following safety risks were identified during admission: fall. Yellow risk band applied: YES.     Skin Initial Assessment    This writer admitted this patient and completed a full skin assessment and Wilfred score in the Adult PCS flowsheet. Appropriate interventions initiated as needed.     Secondary skin check completed by Lilibeth NICOLAS and June PETERSON RN. Has bruise to RFA and blanchable red ness to coccyx and red, sore meatus and end of penis and dry scabs to LLL.         Sanam Brooks

## 2019-03-28 ENCOUNTER — APPOINTMENT (OUTPATIENT)
Dept: OCCUPATIONAL THERAPY | Facility: CLINIC | Age: 84
DRG: 698 | End: 2019-03-28
Payer: COMMERCIAL

## 2019-03-28 ENCOUNTER — APPOINTMENT (OUTPATIENT)
Dept: PHYSICAL THERAPY | Facility: CLINIC | Age: 84
DRG: 698 | End: 2019-03-28
Payer: COMMERCIAL

## 2019-03-28 LAB
ALBUMIN SERPL-MCNC: 2.4 G/DL (ref 3.4–5)
ALP SERPL-CCNC: 48 U/L (ref 40–150)
ALT SERPL W P-5'-P-CCNC: 16 U/L (ref 0–70)
ANION GAP SERPL CALCULATED.3IONS-SCNC: 8 MMOL/L (ref 3–14)
AST SERPL W P-5'-P-CCNC: 26 U/L (ref 0–45)
BACTERIA SPEC CULT: ABNORMAL
BILIRUB SERPL-MCNC: 0.5 MG/DL (ref 0.2–1.3)
BUN SERPL-MCNC: 26 MG/DL (ref 7–30)
CALCIUM SERPL-MCNC: 7.9 MG/DL (ref 8.5–10.1)
CHLORIDE SERPL-SCNC: 111 MMOL/L (ref 94–109)
CO2 SERPL-SCNC: 24 MMOL/L (ref 20–32)
CREAT SERPL-MCNC: 1.28 MG/DL (ref 0.66–1.25)
D DIMER PPP FEU-MCNC: 3.7 UG/ML FEU (ref 0–0.5)
ERYTHROCYTE [DISTWIDTH] IN BLOOD BY AUTOMATED COUNT: 15.4 % (ref 10–15)
GFR SERPL CREATININE-BSD FRML MDRD: 51 ML/MIN/{1.73_M2}
GLUCOSE BLDC GLUCOMTR-MCNC: 145 MG/DL (ref 70–99)
GLUCOSE SERPL-MCNC: 149 MG/DL (ref 70–99)
HCT VFR BLD AUTO: 31.4 % (ref 40–53)
HGB BLD-MCNC: 10.2 G/DL (ref 13.3–17.7)
HGB BLD-MCNC: 9 G/DL (ref 13.3–17.7)
LACTATE BLD-SCNC: 2.2 MMOL/L (ref 0.7–2)
LACTATE BLD-SCNC: 4.7 MMOL/L (ref 0.7–2)
LACTATE BLD-SCNC: 5 MMOL/L (ref 0.7–2)
Lab: ABNORMAL
MCH RBC QN AUTO: 31.5 PG (ref 26.5–33)
MCHC RBC AUTO-ENTMCNC: 32.5 G/DL (ref 31.5–36.5)
MCV RBC AUTO: 97 FL (ref 78–100)
PLATELET # BLD AUTO: 127 10E9/L (ref 150–450)
POTASSIUM SERPL-SCNC: 3.8 MMOL/L (ref 3.4–5.3)
PROT SERPL-MCNC: 5.5 G/DL (ref 6.8–8.8)
RBC # BLD AUTO: 3.24 10E12/L (ref 4.4–5.9)
SODIUM SERPL-SCNC: 143 MMOL/L (ref 133–144)
SPECIMEN SOURCE: ABNORMAL
WBC # BLD AUTO: 23.5 10E9/L (ref 4–11)

## 2019-03-28 PROCEDURE — 25000128 H RX IP 250 OP 636: Performed by: INTERNAL MEDICINE

## 2019-03-28 PROCEDURE — 85379 FIBRIN DEGRADATION QUANT: CPT | Performed by: INTERNAL MEDICINE

## 2019-03-28 PROCEDURE — 25800030 ZZH RX IP 258 OP 636: Performed by: FAMILY MEDICINE

## 2019-03-28 PROCEDURE — 85018 HEMOGLOBIN: CPT | Performed by: INTERNAL MEDICINE

## 2019-03-28 PROCEDURE — 00000146 ZZHCL STATISTIC GLUCOSE BY METER IP

## 2019-03-28 PROCEDURE — 97165 OT EVAL LOW COMPLEX 30 MIN: CPT | Mod: GO

## 2019-03-28 PROCEDURE — 99233 SBSQ HOSP IP/OBS HIGH 50: CPT | Performed by: INTERNAL MEDICINE

## 2019-03-28 PROCEDURE — 25000128 H RX IP 250 OP 636: Performed by: FAMILY MEDICINE

## 2019-03-28 PROCEDURE — 97116 GAIT TRAINING THERAPY: CPT | Mod: GP | Performed by: PHYSICAL THERAPIST

## 2019-03-28 PROCEDURE — 80053 COMPREHEN METABOLIC PANEL: CPT

## 2019-03-28 PROCEDURE — 97161 PT EVAL LOW COMPLEX 20 MIN: CPT | Mod: GP | Performed by: PHYSICAL THERAPIST

## 2019-03-28 PROCEDURE — 83605 ASSAY OF LACTIC ACID: CPT | Performed by: INTERNAL MEDICINE

## 2019-03-28 PROCEDURE — 25800030 ZZH RX IP 258 OP 636: Performed by: INTERNAL MEDICINE

## 2019-03-28 PROCEDURE — 83605 ASSAY OF LACTIC ACID: CPT

## 2019-03-28 PROCEDURE — 20000003 ZZH R&B ICU

## 2019-03-28 PROCEDURE — 85027 COMPLETE CBC AUTOMATED: CPT

## 2019-03-28 PROCEDURE — 25000128 H RX IP 250 OP 636

## 2019-03-28 RX ADMIN — TAZOBACTAM SODIUM AND PIPERACILLIN SODIUM 3.38 G: 375; 3 INJECTION, SOLUTION INTRAVENOUS at 18:56

## 2019-03-28 RX ADMIN — TAZOBACTAM SODIUM AND PIPERACILLIN SODIUM 3.38 G: 375; 3 INJECTION, SOLUTION INTRAVENOUS at 12:26

## 2019-03-28 RX ADMIN — HEPARIN SODIUM 5000 UNITS: 5000 INJECTION, SOLUTION INTRAVENOUS; SUBCUTANEOUS at 02:33

## 2019-03-28 RX ADMIN — SODIUM CHLORIDE 500 ML: 9 INJECTION, SOLUTION INTRAVENOUS at 17:03

## 2019-03-28 RX ADMIN — SODIUM CHLORIDE, POTASSIUM CHLORIDE, SODIUM LACTATE AND CALCIUM CHLORIDE: 600; 310; 30; 20 INJECTION, SOLUTION INTRAVENOUS at 06:07

## 2019-03-28 RX ADMIN — SODIUM CHLORIDE, POTASSIUM CHLORIDE, SODIUM LACTATE AND CALCIUM CHLORIDE: 600; 310; 30; 20 INJECTION, SOLUTION INTRAVENOUS at 20:17

## 2019-03-28 RX ADMIN — TAZOBACTAM SODIUM AND PIPERACILLIN SODIUM 3.38 G: 375; 3 INJECTION, SOLUTION INTRAVENOUS at 06:07

## 2019-03-28 RX ADMIN — HYDROCORTISONE SODIUM SUCCINATE 50 MG: 100 INJECTION, POWDER, FOR SOLUTION INTRAMUSCULAR; INTRAVENOUS at 08:11

## 2019-03-28 RX ADMIN — SODIUM CHLORIDE, POTASSIUM CHLORIDE, SODIUM LACTATE AND CALCIUM CHLORIDE: 600; 310; 30; 20 INJECTION, SOLUTION INTRAVENOUS at 13:38

## 2019-03-28 RX ADMIN — ENOXAPARIN SODIUM 80 MG: 80 INJECTION SUBCUTANEOUS at 09:28

## 2019-03-28 RX ADMIN — HYDROCORTISONE SODIUM SUCCINATE 50 MG: 100 INJECTION, POWDER, FOR SOLUTION INTRAMUSCULAR; INTRAVENOUS at 20:17

## 2019-03-28 RX ADMIN — SODIUM CHLORIDE, POTASSIUM CHLORIDE, SODIUM LACTATE AND CALCIUM CHLORIDE: 600; 310; 30; 20 INJECTION, SOLUTION INTRAVENOUS at 00:46

## 2019-03-28 RX ADMIN — SODIUM CHLORIDE 1000 ML: 9 INJECTION, SOLUTION INTRAVENOUS at 09:28

## 2019-03-28 RX ADMIN — TAZOBACTAM SODIUM AND PIPERACILLIN SODIUM 3.38 G: 375; 3 INJECTION, SOLUTION INTRAVENOUS at 00:46

## 2019-03-28 RX ADMIN — SODIUM CHLORIDE 1000 ML: 9 INJECTION, SOLUTION INTRAVENOUS at 13:38

## 2019-03-28 RX ADMIN — HYDROCORTISONE SODIUM SUCCINATE 50 MG: 100 INJECTION, POWDER, FOR SOLUTION INTRAMUSCULAR; INTRAVENOUS at 00:44

## 2019-03-28 RX ADMIN — ENOXAPARIN SODIUM 80 MG: 80 INJECTION SUBCUTANEOUS at 20:19

## 2019-03-28 ASSESSMENT — ACTIVITIES OF DAILY LIVING (ADL)
AMBULATION: 0-->INDEPENDENT
ADLS_ACUITY_SCORE: 28
TRANSFERRING: 0-->INDEPENDENT
ADLS_ACUITY_SCORE: 28
SWALLOWING: 0-->SWALLOWS FOODS/LIQUIDS WITHOUT DIFFICULTY
ADLS_ACUITY_SCORE: 23
DRESS: 2-->ASSISTIVE PERSON
ADLS_ACUITY_SCORE: 23
NUMBER_OF_TIMES_PATIENT_HAS_FALLEN_WITHIN_LAST_SIX_MONTHS: 1
WHICH_OF_THE_ABOVE_FUNCTIONAL_RISKS_HAD_A_RECENT_ONSET_OR_CHANGE?: AMBULATION;TRANSFERRING
FALL_HISTORY_WITHIN_LAST_SIX_MONTHS: YES
RETIRED_COMMUNICATION: 3-->UNABLE TO UNDERSTAND (NOT RELATED TO LANGUAGE BARRIER)
ADLS_ACUITY_SCORE: 29
ADLS_ACUITY_SCORE: 27
TOILETING: 2-->ASSISTIVE PERSON
BATHING: 2-->ASSISTIVE PERSON
COGNITION: 1 - ATTENTION OR MEMORY DEFICITS
RETIRED_EATING: 0-->INDEPENDENT

## 2019-03-28 NOTE — PROGRESS NOTES
Maxime Powell IP PT Initial Evaluation  03/28/19 1000   Quick Adds   Type of Visit Initial PT Evaluation   Living Environment   Lives With facility resident   Living Arrangements (Memory care)   Home Accessibility no concerns   Transportation Anticipated family or friend will provide   Self-Care   Equipment Currently Used at Home other (see comments)  (stick, owns a walker)   Functional Level Prior   Ambulation 1-->assistive equipment   Transferring 1-->assistive equipment   Toileting 3-->assistive equipment and person   Bathing 3-->assistive equipment and person   Fall history within last six months yes  (per chart)   Number of times patient has fallen within last six months 1   General Information   Onset of Illness/Injury or Date of Surgery - Date 03/27/19   Referring Physician Rebecca Alcazar MD   Patient/Family Goals Statement go back to facility   Pertinent History of Current Problem (include personal factors and/or comorbidities that impact the POC) Per H&P: Maxime Powell is a 85 year old male who presents on 3/27/2019 in septic shock.   Precautions/Limitations fall precautions   Cognitive Status Examination   Orientation person   Level of Consciousness lethargic/somnolent;confused   Follows Commands and Answers Questions 75% of the time   Personal Safety and Judgment impaired;impulsive   Memory impaired   Pain Assessment   Patient Currently in Pain No   Integumentary/Edema   Integumentary/Edema no deficits were identifed   Posture    Posture Kyphosis;Forward head position   Range of Motion (ROM)   ROM Quick Adds No deficits were identified   Strength   Manual Muscle Testing Quick Adds No deficits were identified   Transfer Skills   Transfer Transfer Skill:  Stand to Sit;Transfer Skill: Sit to Stand   Transfer Comments Pt is CGA with transfers with FWW   Transfer Skill:  Sit to Stand   Level of [place]: Sit/Stand contact guard   Physical Assist/Nonphysical Assist: Sit/Stand set-up  required;supervision;verbal cues   Weightbearing Restrictions: Sit/Stand full weight-bearing   Assistive Device for Transfer: Sit/Stand standard walker   Transfer Skill: Stand to Sit   Level of [place]aud: Stand/Sit contact guard   Physical Assist/Nonphysical Assist: Stand/Sit set-up required;supervision;verbal cues   Weight-Bearing Restrictions: Stand/Sit full weight-bearing   Assistive Device: Stand to Sit standard walker   Gait   Gait Gait Skill   Gait Comments Pt amb 120 feet with FWW CGA with verbal and tacile cues for direction and safety, implusive and doesn't want to use the walker at times.    Gait Skills   Level of Maud: Gait contact guard   Physical Assist/Nonphysical Assist: Gait set-up required;supervision;verbal cues;nonverbal cues (demo/gestures)   Weight-Bearing Restrictions: Gait full weight-bearing   Assistive Device for Transfer: Gait standard walker   Gait Distance other (see comment)  (120 feet)   Balance   Balance no deficits were identified   Sensory Examination   Sensory Perception no deficits were identified   Coordination   Coordination no deficits were identified   Muscle Tone   Muscle Tone no deficits were identified   General Therapy Interventions   Planned Therapy Interventions transfer training;gait training;strengthening;neuromuscular re-education   Intervention Comments to address impairments above, improve overall functional capacity   Clinical Impression   Criteria for Skilled Therapeutic Intervention yes, treatment indicated   PT Diagnosis generalized weakness   Influenced by the following impairments weakness   Functional limitations due to impairments impaired gait, decreased endurance from baseline   Clinical Presentation Stable/Uncomplicated   Clinical Presentation Rationale PT judgement   Clinical Decision Making (Complexity) Low complexity   Therapy Frequency` daily   Predicted Duration of Therapy Intervention (days/wks) 3 days   Anticipated Discharge Disposition  "Other (see comments)  (Back to memory care)   Risk & Benefits of therapy have been explained Yes   Patient, Family & other staff in agreement with plan of care Yes   Clinical Impression Comments Pt is a pleasant 86 y/o male presenting to PT with generalzied weakness. He will benefit from PT to improve function and return to PLOF. Pt is a fair PT candiate.    [facility] AM-PAC TM \"6 Clicks\"   2016, Trustees of [facility], under license to [employer].  All rights reserved.   6 Clicks Short Forms Basic Mobility Inpatient Short Form   [facility] AM-PAC  \"6 Clicks\" V.2 Basic Mobility Inpatient Short Form   1. Turning from your back to your side while in a flat bed without using bedrails? 4 - None   2. Moving from lying on your back to sitting on the side of a flat bed without using bedrails? 4 - None   3. Moving to and from a bed to a chair (including a wheelchair)? 3 - A Little   4. Standing up from a chair using your arms (e.g., wheelchair, or bedside chair)? 4 - None   5. To walk in hospital room? 3 - A Little   6. Climbing 3-5 steps with a railing? 3 - A Little   Basic Mobility Raw Score (Score out of 24.Lower scores equate to lower levels of function) 21   Total Evaluation Time   Total Evaluation Time (Minutes) 12     Please Contact me with any questions or concerns. Thank you for for patience and cooperation.     Neo Chavez PT, DPT  Flexible Workforce Physical Therapist   [facility]  [email or web address]    "

## 2019-03-28 NOTE — PLAN OF CARE
Discharge Planner PT   Patient plan for discharge: Back to memory care facility  Current status: CGA with transfers with FWW, Pt amb 120 feet with FWW CGA with verbal and tacile cues for direction and safety, implusive and doesn't want to use the walker at times.  Barriers to return to prior living situation: decreased endurance  Recommendations for discharge: Back to [facility] care facility   Rationale for recommendations: good with gait with walker, appears to have one at facility, will attempt cane tomorrow if able, PT judgement.        Entered by: Neo Chavez 03/28/2019 12:08 PM     Please Contact me with any questions or concerns. Thank you for for patience and cooperation.     Neo Chavez PT, DPT  Flexible Workforce Physical Therapist   [facility]  [email or web address]

## 2019-03-28 NOTE — PROVIDER NOTIFICATION
DATE:  3/28/2019   TIME OF RECEIPT FROM LAB:  6322  LAB TEST:  Lactic  LAB VALUE:  2.2  RESULTS GIVEN WITH READ-BACK TO (PROVIDER):  Dr. Alcazar  TIME LAB VALUE REPORTED TO PROVIDER:   4902

## 2019-03-28 NOTE — PLAN OF CARE
VSS. BP ok off levophed. Pt now awake and alert but confused and has baseline dementia. Pt fidgeting with bed linens. Not able to hold a complete conversation.bed alarm on.

## 2019-03-28 NOTE — PLAN OF CARE
Pt has been sleeping quietly throughout night. Repositioned with pillows pt arouses and opens eyes but has not spoken a sentence. He is very difficult with oral cares. Lips and tongue very dry but pt pushes away the oral swabs and shuts his mouth so unable to do adequate oral care at this time.

## 2019-03-28 NOTE — PROGRESS NOTES
[facility]  Procedure Note         PICC      Maxime Powell  MRN# [id number]   March 27, 2019, 7:20 PM Indication: Hypotension  Laboratory sampling  Medication administration           Pause for the cause: Consent for catheter placement procedure signed  Time out completed  Patient ID's verified using two distinct indicators  All necessary equipment is present   Type of line to be used: PICC   Full barrier precautions used: Yes   Skin preparation: Chlorehexidine Gluconate 25% with isopropyl alcohol 70%   Date of insertion: March 27, 2019, 7:05 PM   Device type: Double lumen, valved, 5.0   Catheter brand: Bard Solo Power   Lot number: [id number]   Insertion location: Left basilic vein   Method of placement: Venipuncture  MST  Ultrasound   Number of attempts: With ultrasound: 1   Without ultrasound: 0   Difficulty threading: No   Midline IV device: na   Arm circumference: 26 - measured 10 cm above Left AC   Midline extremity circumference: na cm   Internal length: 45 cm   Midline visible catheter length: 1 cm to hub   Total catheter length: 46 cm to hub   Tip termination: SVC/RA   Method of verification: Chest x-ray   Midline patency post placement: Positive blood return  Flushes without difficulty  Saline locked   Line flush: Line flush documented on the eMAR yes   Placement verified by: Radiologist   Catheter placed by: Kathryn Robb RN, A-BC   Discontinuation form initiated: No   Patient tolerance: Tolerated well   PICC Insertion Education Complete: Yes - to family - patient unable      Summary:  This procedure was performed without difficulty and he tolerated the procedure well with no noted immediate complications.       Recorded by Kathryn Robb RN [facility]    Attestation:  Amount of time performed on this procedure: 15 minutes.    Picc is in good position with tip at the SVC/RA junction per Xray.    OK to use for meds, labs and power injection.

## 2019-03-28 NOTE — PLAN OF CARE
Pt awake and fidgeting with lines , iv's. Pt speaking and answering questions though does not follow commands  and responses are mostly unrelated to the questions. A sleeve placed on pt's arm to hid IV that pt was pulling on and he took the sleeve right off several times despite being told to leave it on. Pt is not comprehending situation.  Does not have complaints at this time.frequent safety checks.

## 2019-03-28 NOTE — PLAN OF CARE
Pt restless, putting legs over side of bed. Pt had a smear of BM on the bed, assist of 2 to the bedside commode, passed gas and a small soft stool.back in bed and resting quietly at present. Bed alarm on.

## 2019-03-28 NOTE — PLAN OF CARE
Disoriented to place, time and situation. BPs stable and levophed discontinued. Lactic critically elevated x 2. 1L NS bolus given this am and another this afternoon. Lactic recheck due at 1600. PT and OT consulted. Up to chair most of shift and ambulating in colung[place] and to bathroom with walker and 1 assist.

## 2019-03-28 NOTE — ED PROVIDER NOTES
History     Chief Complaint   Patient presents with     Fever     hx of UTI, comes from [facility] in [place]. No urinary output via maritnez since last night     HPI  Maxime Powell is a 85 year old male who was brought in by paramedics from nursing home  [place] with no urinary output from his Martinez catheter.  He has a history of prostate cancer and urinary retention and has a chronic indwelling Martinez catheter.  On arrival he is noted to have a temp of 103.  The patient is stuporous and nearly comatose and unable to provide any history.  History is from EMS and medical records.  No family is currently available to provide additional information.    Allergies:  Allergies   Allergen Reactions     Finasteride      Other reaction(s): Gynecomastia  Required tamoxifen treatment     Penicillins      Other reaction(s): Edema, Erythema     Gold Rash     Injectable gold       Problem List:    Patient Active Problem List    Diagnosis Date Noted     Septic shock due to urinary tract infection (H) 03/27/2019     Priority: Medium     Urinary tract infection associated with indwelling urinary catheter (H) 03/27/2019     Priority: Medium     Acute renal injury due to sepsis (H) 03/27/2019     Priority: Medium     Immunosuppressed status (H) 03/27/2019     Priority: Medium     Atrial fibrillation with rapid ventricular response (H) 03/27/2019     Priority: Medium     Septic shock (H) 03/27/2019     Priority: Medium     Coronary atherosclerosis 01/31/2018     Priority: Medium     Cough 01/31/2018     Priority: Medium     Elevated prostate specific antigen (PSA) 01/31/2018     Priority: Medium     Lumbago 01/31/2018     Priority: Medium     Migraine headache 01/31/2018     Priority: Medium     Nocturia 01/31/2018     Priority: Medium     Olecranon bursitis 01/31/2018     Priority: Medium     Other symptoms involving digestive system(787.99) 01/31/2018     Priority: Medium     Pain in joint, shoulder region 01/31/2018      Priority: Medium     Urinary frequency 01/31/2018     Priority: Medium     Aftercare following surgery of the musculoskeletal system 11/04/2014     Priority: Medium     Acute urinary retention 05/07/2014     Priority: Medium     ASCVD (arteriosclerotic cardiovascular disease) 05/07/2014     Priority: Medium     Overview:   S/p RCA stenting 2001       Diabetes mellitus, type 2 (H) 05/07/2014     Priority: Medium     Hyperlipidemia 05/07/2014     Priority: Medium     Hypertension 05/07/2014     Priority: Medium     Rheumatoid arthritis (H) 05/07/2014     Priority: Medium     Osteoarthritis of glenohumeral joint 02/13/2014     Priority: Medium     Prostate cancer (H) 08/27/2013     Priority: Medium     Overview:   S/p radition tx 1987 in [place]           Past Medical History:    Past Medical History:   Diagnosis Date     Atherosclerotic heart disease of native coronary artery without angina pectoris      Essential (primary) hypertension      Hyperlipidemia      Mononeuropathy of left lower extremity      Osteoarthritis      Personal history of malignant neoplasm of prostate      RA (rheumatoid arthritis) (H)      Rheumatoid arthritis (H)      Sjogren-Ashwin syndrome      Strain of muscle, fascia and tendon of long head of biceps, unspecified arm, initial encounter      Type 2 diabetes mellitus without complications (H)        Past Surgical History:    Past Surgical History:   Procedure Laterality Date     APPENDECTOMY OPEN      1960     ARTHROSCOPY SHOULDER ROTATOR CUFF REPAIR      2002,right, Jim Chanel MD     ARTHROTOMY WRIST      1986     COLONOSCOPY      2014     HEART CATH, ANGIOPLASTY      2001,right coronary artery     OTHER SURGICAL HISTORY      05/07/2014,[id number],SHOULDER REPLACEMENT,Right,reverse     OTHER SURGICAL HISTORY      07425.0,NM BONE SCAN 3 PHASE,09/06/13 negative for  metastases     OTHER SURGICAL HISTORY      876198,OTHER,no evidence of metastases     OTHER SURGICAL HISTORY      438574,OTHER      OTHER SURGICAL HISTORY      857385,OTHER,right       Family History:    Family History   Problem Relation Age of Onset     Cancer Father         Cancer,throat cancer     Breast Cancer Sister         Cancer-breast       Social History:  Marital Status:   [5]  Social History     Tobacco Use     Smoking status: Former Smoker     Years: 30.00     Types: Cigarettes     Last attempt to quit: 1980     Years since quittin.2     Smokeless tobacco: Former User     Types: Chew     Quit date: 2014     Tobacco comment: Quit smoking: quit smoking 33 years ago. Chew's daily   Substance Use Topics     Alcohol use: No     Drug use: No     Types: Other     Comment: Drug use: No        Medications:      No current outpatient medications on file.      Review of Systems    All other systems are reviewed and are negative    Physical Exam   BP: 150/84  Pulse: 104  Heart Rate: 130  Temp: 102.1  F (38.9  C)  Resp: 28  Weight: 77.1 kg (170 lb)  SpO2: 97 %      Physical Exam  Nursing note and vitals were reviewed.  Constitutional: Stuporous 85-year-old male difficult to arouse who arouses somewhat to voice but is unable to speak or obey commands.  He is not agitated and does not appear to have labored breathing.  He appears chronically and acutely ill.  HEENT: EACs clear.  TMs normal.  Oropharynx dry mucous membranes.  Largely edentulous.  EOMI.    Neck: He moves his neck spontaneously freely but does not obey commands..  Cardiovascular: Cardiac examination reveals tachycardic heart rate and regular rhythm without murmur.  Pulmonary/Chest: Breathing is unlabored.  Breath sounds are clear and equal bilaterally.  There no retractions, tachypnea, rales, wheezes, or rhonchi.  Abdomen: Soft, he seems to moan a bit with palpation of the abdomen but the abdomen is nondistended.  Bowel sounds are active.  No palpable masses..  Musculoskeletal: Extremities are warm and well-perfused and he moves them freely.  Motor tone is  normal.  No evident facial droop.  Neurological: Alert, oriented, thought content logical, coherent   Skin: Warm, dry, no rashes.  Psychiatric: Stuporous.      ED Course        Procedures               EKG Interpretation:      Interpreted by Lalo Ling  Time reviewed: 14:06  Symptoms at time of EKG: coma   Rhythm: uncertain; favor sinus   Rate: 119  Axis: normal  Ectopy: none  Conduction: normal  ST Segments/ T Waves: No ST-T wave changes  Q Waves: none  Comparison to prior: No old EKG available    Clinical Impression: tackycardia, undetermined rhythm           Critical Care time:  was 60 minutes for this patient excluding procedures.  The patient has signs of Septic Shock as evidenced by:    1. Presence of Sepsis, AND  2. Lactic Acid level greater than or equal to 4 and Persistent hypotension defined by the last 2 BP readings within the ONE HOUR follwing completion of the 30mL/kg bolus being low (SBP <90 or MAP <65)    Time septic shock diagnosis confirmed = lactate result as this was the time when Lactate was resulted and the level was greater than or equal to 4      3 Hour Septic Shock Bundle Completion:  1. Initial Lactic Acid Result:   Recent Labs   Lab Test 03/27/19  1513 01/02/19  2149   LACT 3.5* 1.6     2. Blood Cultures before Antibiotics: Yes  3. Broad Spectrum Antibiotics Administered: Yes     Anti-infectives (From now, onward)    Start     Dose/Rate Route Frequency Ordered Stop    03/27/19 1300  piperacillin-tazobactam (ZOSYN) infusion 3.375 g      3.375 g  100 mL/hr over 30 Minutes Intravenous EVERY 6 HOURS 03/27/19 1259          4. 30ml/kg ml of IV fluids.  Ideal body weight: 77.6 kg (171 lb 1.2 oz)    Severe Sepsis reassessment:  1. Repeat Lactic Acid Level: done  2. Vasopressors started for Persistent Hypotension defined by the last 2 BP readings within the ONE HOUR follwing completion of the 30mL/kg bolus being low (SBP <90 or MAP <65).  I attest to having performed a repeat sepsis exam and  assessment of perfusion at 16:30 and the results demonstrate no change.                Results for orders placed or performed during the hospital encounter of 03/27/19 (from the past 24 hour(s))   Influenza A/B antigen   Result Value Ref Range    Influenza A/B Agn Specimen Nasal     Influenza A Negative NEG^Negative    Influenza B Negative NEG^Negative   Comprehensive metabolic panel   Result Value Ref Range    Sodium 142 133 - 144 mmol/L    Potassium 4.2 3.4 - 5.3 mmol/L    Chloride 109 94 - 109 mmol/L    Carbon Dioxide 22 20 - 32 mmol/L    Anion Gap 11 3 - 14 mmol/L    Glucose 142 (H) 70 - 99 mg/dL    Urea Nitrogen 31 (H) 7 - 30 mg/dL    Creatinine 1.65 (H) 0.66 - 1.25 mg/dL    GFR Estimate 37 (L) >60 mL/min/[1.73_m2]    GFR Estimate If Black 43 (L) >60 mL/min/[1.73_m2]    Calcium 8.7 8.5 - 10.1 mg/dL    Bilirubin Total 0.5 0.2 - 1.3 mg/dL    Albumin 3.2 (L) 3.4 - 5.0 g/dL    Protein Total 6.6 (L) 6.8 - 8.8 g/dL    Alkaline Phosphatase 68 40 - 150 U/L    ALT 18 0 - 70 U/L    AST 19 0 - 45 U/L   CBC with platelets differential   Result Value Ref Range    WBC 10.4 4.0 - 11.0 10e9/L    RBC Count 3.69 (L) 4.4 - 5.9 10e12/L    Hemoglobin 11.6 (L) 13.3 - 17.7 g/dL    Hematocrit 36.7 (L) 40.0 - 53.0 %     78 - 100 fl    MCH 31.4 26.5 - 33.0 pg    MCHC 31.6 31.5 - 36.5 g/dL    RDW 14.9 10.0 - 15.0 %    Platelet Count 150 150 - 450 10e9/L    Diff Method Automated Method     % Neutrophils 96.7 %    % Lymphocytes 1.5 %    % Monocytes 0.9 %    % Eosinophils 0.0 %    % Basophils 0.1 %    % Immature Granulocytes 0.8 %    Nucleated RBCs 0 0 /100    Absolute Neutrophil 10.1 (H) 1.6 - 8.3 10e9/L    Absolute Lymphocytes 0.2 (L) 0.8 - 5.3 10e9/L    Absolute Monocytes 0.1 0.0 - 1.3 10e9/L    Absolute Eosinophils 0.0 0.0 - 0.7 10e9/L    Absolute Basophils 0.0 0.0 - 0.2 10e9/L    Abs Immature Granulocytes 0.1 0 - 0.4 10e9/L    Absolute Nucleated RBC 0.0    Lactate for Sepsis Protocol   Result Value Ref Range    Lactate for  Sepsis Protocol 7.2 () 0.7 - 2.0 mmol/L   Blood culture   Result Value Ref Range    Specimen Description Blood Right Arm     Special Requests Aerobic and anaerobic bottles received     Culture Micro (A)      Cultured on the 1st day of incubation:  Gram negative rods      Culture Micro       Critical Value/Significant Value, preliminary result only, called to and read back by  PANKAJ VARGAS RN @2302 3/27/19. SCG      Culture Micro       (Note)  POSITIVE for KLEBSIELLA PNEUMONIAE by Verigene multiplex nucleic acid  test. Final identification and antimicrobial susceptibility testing  will be verified by standard methods.    Specimen tested with Verigene multiplex, gram-negative blood culture  nucleic acid test for the following targets: Acinetobacter sp.,  Citrobacter sp., Enterobacter sp., Proteus sp., E. coli, K.  pneumoniae/oxytoca, P. aeruginosa, and the following resistance  markers: CTXM, KPC, NDM, VIM, IMP and OXA.    Critical Value/Significant Value called to and read back by Pankaj Vargas RN on [facility] at 0128 on 3.28.19 RD.     UA reflex to Microscopic   Result Value Ref Range    Color Urine Yellow     Appearance Urine Cloudy     Glucose Urine Negative NEG^Negative mg/dL    Bilirubin Urine Negative NEG^Negative    Ketones Urine Negative NEG^Negative mg/dL    Specific Gravity Urine 1.010 1.003 - 1.035    Blood Urine Large (A) NEG^Negative    pH Urine 6.0 5.0 - 7.0 pH    Protein Albumin Urine 100 (A) NEG^Negative mg/dL    Urobilinogen mg/dL 0.0 0.0 - 2.0 mg/dL    Nitrite Urine Negative NEG^Negative    Leukocyte Esterase Urine Large (A) NEG^Negative    Source Catheterized Urine     RBC Urine 152 (H) 0 - 2 /HPF    WBC Urine >182 (H) 0 - 5 /HPF    WBC Clumps Present (A) NEG^Negative /HPF    Bacteria Urine Moderate (A) NEG^Negative /HPF   Urine Culture   Result Value Ref Range    Specimen Description Catheterized Urine     Special Requests Specimen received in preservative     Culture Micro PENDING    Blood  culture   Result Value Ref Range    Specimen Description Blood Left Hand     Special Requests Aerobic and anaerobic bottles received     Culture Micro (A)      Cultured on the 1st day of incubation:  Gram negative rods      Culture Micro       Critical Value/Significant Value, preliminary result only, called to and read back by  Taj Davis RN at [facility] at 0148 on 3.28.19 RD.     XR Chest Port 1 View    Narrative    CHEST PORTABLE ONE VIEW March 27, 2019 1:32 PM     HISTORY: Fever.      Impression    IMPRESSION: Interstitial prominence likely representing chronic  fibrosis. No pulmonary consolidation is demonstrated. Right  glenohumeral joint reverse arthroplasty is noted.    RICKY SANTILLAN MD   Lactic acid whole blood   Result Value Ref Range    Lactic Acid 3.5 (H) 0.7 - 2.0 mmol/L   Chest  XR, 1 view portable    Narrative    XR CHEST PORT 1 VW   3/27/2019 4:49 PM     HISTORY: short of breath   low b/p    COMPARISON: 3/27/2019      Impression    IMPRESSION: No significant change. Again noted is interstitial  prominence of the lungs. Question central pulmonary vascular  congestion. Correlate clinically for signs and symptoms of CHF.    PAM BENITEZ MD   Glucose by meter   Result Value Ref Range    Glucose 146 (H) 70 - 99 mg/dL   Methicillin Resist/Sens S. aureus PCR   Result Value Ref Range    Specimen Description Nares     Methicillin Resist/Sens S. aureus PCR Negative NEG^Negative   XR Chest Port 1 View    Narrative    CHEST ONE VIEW PORTABLE    3/27/2019 7:26 PM     HISTORY: Confirm PICC placement.    COMPARISON: 3/27/2019      Impression    IMPRESSION: Left PICC tip is in the lower superior vena cava near the  atriocaval junction in expected position. Lung volumes are unchanged.  No new opacities are identified. Cardiac silhouette is enlarged,  unchanged. Right shoulder prosthesis noted.    GRAHAM HINOJOSA MD   CBC with platelets   Result Value Ref Range    WBC 23.5 (H) 4.0 - 11.0 10e9/L    RBC Count  3.24 (L) 4.4 - 5.9 10e12/L    Hemoglobin 10.2 (L) 13.3 - 17.7 g/dL    Hematocrit 31.4 (L) 40.0 - 53.0 %    MCV 97 78 - 100 fl    MCH 31.5 26.5 - 33.0 pg    MCHC 32.5 31.5 - 36.5 g/dL    RDW 15.4 (H) 10.0 - 15.0 %    Platelet Count 127 (L) 150 - 450 10e9/L   Comprehensive metabolic panel   Result Value Ref Range    Sodium 143 133 - 144 mmol/L    Potassium 3.8 3.4 - 5.3 mmol/L    Chloride 111 (H) 94 - 109 mmol/L    Carbon Dioxide 24 20 - 32 mmol/L    Anion Gap 8 3 - 14 mmol/L    Glucose 149 (H) 70 - 99 mg/dL    Urea Nitrogen 26 7 - 30 mg/dL    Creatinine 1.28 (H) 0.66 - 1.25 mg/dL    GFR Estimate 51 (L) >60 mL/min/[1.73_m2]    GFR Estimate If Black 59 (L) >60 mL/min/[1.73_m2]    Calcium 7.9 (L) 8.5 - 10.1 mg/dL    Bilirubin Total 0.5 0.2 - 1.3 mg/dL    Albumin 2.4 (L) 3.4 - 5.0 g/dL    Protein Total 5.5 (L) 6.8 - 8.8 g/dL    Alkaline Phosphatase 48 40 - 150 U/L    ALT 16 0 - 70 U/L    AST 26 0 - 45 U/L       Medications   sodium chloride (PF) 0.9% PF flush 3 mL (not administered)   lactated ringers infusion ( Intravenous New Bag 3/28/19 0607)   0.9% sodium chloride BOLUS (0 mLs Intravenous Stopped 3/27/19 1652)   piperacillin-tazobactam (ZOSYN) infusion 3.375 g (3.375 g Intravenous New Bag 3/28/19 0607)   norepinephrine (LEVOPHED) 16 mg in D5W 250 mL infusion (0 mcg/kg/min × 77.1 kg Intravenous Stopped 3/28/19 0310)   lidocaine (LMX4) cream (not administered)   heparin lock flush 10 UNIT/ML injection 2-5 mL (not administered)   naloxone (NARCAN) injection 0.1-0.4 mg (not administered)   heparin sodium injection 5,000 Units (5,000 Units Subcutaneous Given 3/28/19 0233)   HYDROmorphone (DILAUDID) injection 0.2 mg (not administered)   ondansetron (ZOFRAN-ODT) ODT tab 4 mg (not administered)     Or   ondansetron (ZOFRAN) injection 4 mg (not administered)   prochlorperazine (COMPAZINE) injection 5 mg (not administered)     Or   prochlorperazine (COMPAZINE) tablet 5 mg (not administered)     Or   prochlorperazine  (COMPAZINE) Suppository 12.5 mg (not administered)   senna-docusate (SENOKOT-S/PERICOLACE) 8.6-50 MG per tablet 1 tablet (not administered)     Or   senna-docusate (SENOKOT-S/PERICOLACE) 8.6-50 MG per tablet 2 tablet (not administered)   polyethylene glycol (MIRALAX/GLYCOLAX) Packet 17 g (not administered)   hydrocortisone sodium succinate PF (solu-CORTEF) injection 50 mg (50 mg Intravenous Given 3/28/19 0044)   sodium chloride (PF) 0.9% PF flush 10-20 mL (not administered)   sodium chloride (PF) 0.9% PF flush 10 mL (10 mLs Intracatheter Given 3/27/19 1924)   0.9% sodium chloride BOLUS (0 mLs Intravenous Stopped 3/27/19 1444)   acetaminophen (TYLENOL) Suppository 650 mg (650 mg Rectal Given 3/27/19 1227)   lactated ringers BOLUS 500 mL (0 mLs Intravenous Stopped 3/27/19 1444)   hydrocortisone sodium succinate PF (solu-CORTEF) injection 50 mg (50 mg Intravenous Given 3/27/19 1556)   lidocaine 1 % 0.5-5 mL (1 mL Other Given 3/27/19 1845)   sodium chloride (PF) 0.9% PF flush 5-50 mL (10 mLs Intracatheter Given 3/27/19 1906)       Assessments & Plan (with Medical Decision Making)     85-year-old home patient with chronic indwelling Murcia catheter present and coma.  Clearly is infected.  Aggressive therapy with rotation, culturing, and antibiotics.  His last Urine culture had grown Pseudomonas.  Because of this Zosyn therapy was initiated.  He received a 30 cc/kg fluid bolus.  After this he then developed hypotension.  Norepinephrine infusion was initiated.  We later found a form from the nursing home that was a POLST order that the patient have comfort cares only.   spoke with the patient's son who indicated that he felt the patient should have full medical therapy but no intubation, CPR, or central line placement.  He would agreed to PICC line placement.  Therapies were continued following this discussion.  I had concerns about whether the patient may also have had an intra-abdominal process but he was not  sufficiently stable to transport to CT for CT of his abdomen.  At the time of transfer to the ICU blood pressure had just barely improved on the norepinephrine drip to slightly above 100.  We will continue to monitor his abdominal status and clinical course to determine if he should undergo advanced imaging to look for a problem in the abdomen contributing to his sepsis.  Most likely this is urinary tract sepsis.  Chest x-ray did not show a source for his fever.  This was a single portable view because he was not stable enough to move to the radiology department.  Rapid flu testing was negative.  The other laboratory studies were merely consistent with sepsis.  Lactate improved significantly from over 7 to about 3.5 on reevaluation after the above resuscitation.  He remains critically ill at the time of transfer to the ICU.  His case was reviewed with  of the hospital service and they will assume care.    I have reviewed the nursing notes.    I have reviewed the findings, diagnosis, plan and need for follow up with the patient.          Medication List      There are no discharge medications for this visit.         Final diagnoses:   Septic shock (H)   Urinary tract infection without hematuria, site unspecified       3/27/2019   [facility]     Lalo Ling MD  03/28/19 0803

## 2019-03-28 NOTE — CONSULTS
CARE TRANSITION SOCIAL WORK INITIAL ASSESSMENT:      DATA  Principal Problem:    Septic shock due to urinary tract infection (H)  Active Problems:    Diabetes mellitus, type 2 (H)    Hypertension    Rheumatoid arthritis (H)    Urinary tract infection associated with indwelling urinary catheter (H)    Acute renal injury due to sepsis (H)    Immunosuppressed status (H)    Atrial fibrillation with rapid ventricular response (H)    Septic shock (H)       Primary Care Clinic Name: [facility]     Contact information and PCP information verified: Yes      ASSESSMENT  Cognitive Status: awake.       Resources List: Assisted Living              Description of Support System: Supportive, Involved   Who is your support system?: Children, Facility resident(s)/Staff   Support Assessment: Adequate family and caregiver support, Adequate social supports   Insurance Concerns: No Insurance issues identified and Has benefits for meals/lodging/transportation/mileage        This writer left a VM with pts son, as he is the designated health care decision maker, doucments in pts chart. Per chart review, pt lives at [facility] (phone: [phone] Fax: [phone]). This writer did call the long term and left VM, will wait on RN to call back and further discuss the services that pt is receiving.     PLAN    Undetermined, return to long term? Will wait on call back from son and long term.         Johanna LLANES, [facility], [facility] [phone]

## 2019-03-28 NOTE — PROGRESS NOTES
Addendum    Lactate 4.5.    Will give ns bolus 1000 ml, Increase lactated ringers to 150/hr and recheck lactate after bolus.    Rebecca Alcazar

## 2019-03-28 NOTE — PROGRESS NOTES
[facility]    Hospital Medicine Progress Note  Date of Service: 03/28/2019    Assessment & Plan   Maxime Powell is a 85 year old male who presented on 3/27/2019 with  Fever and sepsis    Assessment & Plan          1. Septic shock due to urinary tract infection  -transferred from [facility] to the [facility] emergency department with a temperature of 102.1, heart rate as high as 130, respiratory rate 28, and initial blood pressure 150/84.  However, blood pressure has declined, presently not in the 70s systolic range, being started on a norepinephrine continuous infusion.  Initial lactate was 7.2.  Etiology appears to be urinary tract infection.Today off pressors, bp better, no fever and more interactive. Awaiting am lactate  -Zosyn IV started empirically in the emergency department.2 blood cx pos for klebsiella pneumonia.  -The patient was on prednisone for the treatment of rheumatoid arthritis.  Changed to stress dose  hc on admit- hydrocortisone 50 mg iv q 8 and I will decrease to q 12.    2.  Urinary tract infection associated with indwelling urinary catheter (H) -the patient has a chronic indwelling urinary catheter, apparently present due to sequela of prostate cancer.  He has had prior urinary tract infection.  The 2 previous cultures have grown a pansensitive Pseudomonas.  A culture in October 2018 grew enterococcus, ampicillin sensitive.  UA here shows substantial bacteriuria and pyuria.  Urine  cx pending but 2/2 blood cx pos for klebsiella. Await sensitivities but cont zosym    3. Heme- leukocytosis increased today, likely a result of infection and will follow. May have been too ill with sepsis on admit to respond appropriately. Will not change antibx at this time with pt otherwise improving                - anemia- slight drop min hgb all though this may be his baseline. Will follow with q 8 hgb for now                - thrombocytopenia- could be from  "sepsis, less likely heparin as seems too soon for HIT.  Will follow      4.Diabetes mellitus, type 2  -no previous hemoglobin A1c is found.  Outpatient regimen appears to be limited to metformin 500 mg p.o. twice daily.  -off metformin due to shock and lactic acidosis.  -cont sliding scale insulin, moderate dose.  -bs ok       5.Hypertension -managed as an outpatient with lisinopril 10 mg PO every day.  He is hypotensive here due to septic shock.  -Hold lisinopril.       6.Rheumatoid arthritis ,   Immunosuppressed status  -outpatient regimen includes hydroxychloroquine, prednisone 7.5 mg p.o. daily, and although it is not on his med list from assisted living, the patient's son says that he is also on methotrexate, dose unknown.  I see no active inflamed joints on examination.  -\"Stress\" steroids with hydrocortisone are being given due to shock- decr dose today to hc 50 q 12  -Hydroxychloroquine and methotrexate will be held  While active infection treated      7. Acute renal injury due to sepsis  -improving with creat 1.28 today. ( baseline 1.13) This probably represents acute kidney injury due to hypovolemia from septic shock.  -See above regarding fluid resuscitation thus far.  -All nephrotoxins will be held, including lisinopril.  .     8.  Atrial fibrillation with rapid ventricular response NEW. -admission EKG shows atrial fibrillation with rapid ventricular response. ON TELE THIS PERSISTS .likely due to septic shock.will check ddimer, echo and tsh in case anything else needs to be treated. Since still in afib and chadsvasc2 score is 4, will start lovenox today. Will need to discuss risk/ benefit of long term anticoag with family before discharge to [facility]  -No rate control needed      9. Dementia- chronic     DVT Prophylaxis: starting lovenox for afib  Code Status: DNR / DNI.  A POLSTwas filled out by son this admit after talking to family members  Treatment should include pressor therapy if needed, and the " patient's son gives consent for the placement of a PICC line for delivery of medications, fluids, and pressors.   Lines: Peripheral, though PICC is ordered.  [place]cia catheter: Chronic.  Restraints: None.       Disposition: Anticipate discharge 1-2 days. Get ot/pt          Rebecca Duqueus       Interval History   Off pressors early am. Talking but frequently doesn't make sense and rambles. Denies all ros    Physical Exam   Temp:  [98.2  F (36.8  C)-102.1  F (38.9  C)] 98.3  F (36.8  C)  Pulse:  [104-133] 118  Heart Rate:  [] 83  Resp:  [0-28] 16  BP: ()/(46-84) 104/66  SpO2:  [92 %-98 %] 98 %    Weights:   Vitals:    03/27/19 1208 03/27/19 1710 03/28/19 0730   Weight: 77.1 kg (170 lb) 72.8 kg (160 lb 7.9 oz) 75.2 kg (165 lb 12.6 oz)    Body mass index is 22.48 kg/m .    Constitutional: nad  CV: Regular  Respiratory: CTA bilaterally  GI: Soft, nontender  Skin: Warm and dry  Musculoskeletal:no edema. 2+ dp    Data   Recent Labs   Lab 03/28/19  0520 03/27/19  1226   WBC 23.5* 10.4   HGB 10.2* 11.6*   MCV 97 100   * 150    142   POTASSIUM 3.8 4.2   CHLORIDE 111* 109   CO2 24 22   BUN 26 31*   CR 1.28* 1.65*   ANIONGAP 8 11   LAISHA 7.9* 8.7   * 142*   ALBUMIN 2.4* 3.2*   PROTTOTAL 5.5* 6.6*   BILITOTAL 0.5 0.5   ALKPHOS 48 68   ALT 16 18   AST 26 19       Recent Labs   Lab 03/28/19  0520 03/27/19  1821 03/27/19  1226   *  --  142*   BGM  --  146*  --         Unresulted Labs Ordered in the Past 30 Days of this Admission     Date and Time Order Name Status Description    3/27/2019 1250 Blood culture Preliminary     3/27/2019 1233 Urine Culture Preliminary     3/27/2019 1233 Blood culture Preliminary          2/2 pos bld for klebsiella  Imaging  Recent Results (from the past 24 hour(s))   XR Chest Port 1 View    Narrative    CHEST PORTABLE ONE VIEW March 27, 2019 1:32 PM     HISTORY: Fever.      Impression    IMPRESSION: Interstitial prominence likely representing chronic  fibrosis. No  pulmonary consolidation is demonstrated. Right  glenohumeral joint reverse arthroplasty is noted.    RICKY SANTILLAN MD   Chest  XR, 1 view portable    Narrative    XR CHEST PORT 1 VW   3/27/2019 4:49 PM     HISTORY: short of breath   low b/p    COMPARISON: 3/27/2019      Impression    IMPRESSION: No significant change. Again noted is interstitial  prominence of the lungs. Question central pulmonary vascular  congestion. Correlate clinically for signs and symptoms of CHF.    PAM BENITEZ MD   XR Chest Port 1 View    Narrative    CHEST ONE VIEW PORTABLE    3/27/2019 7:26 PM     HISTORY: Confirm PICC placement.    COMPARISON: 3/27/2019      Impression    IMPRESSION: Left PICC tip is in the lower superior vena cava near the  atriocaval junction in expected position. Lung volumes are unchanged.  No new opacities are identified. Cardiac silhouette is enlarged,  unchanged. Right shoulder prosthesis noted.    GRAHAM HINOJOSA MD       ekg admit- afib    Medications     lactated ringers 150 mL/hr at 03/28/19 0607       sodium chloride 0.9%  30 mL/kg Intravenous Once     enoxaparin  1 mg/kg Subcutaneous Q12H     hydrocortisone sodium succinate PF  50 mg Intravenous Q12H     piperacillin-tazobactam  3.375 g Intravenous Q6H     sodium chloride (PF)  10 mL Intracatheter Q7 Days       Rebecca Alcazar

## 2019-03-28 NOTE — PROGRESS NOTES
03/28/19 1200   Quick Adds   Type of Visit Initial Occupational Therapy Evaluation   Living Environment   Lives With facility resident   Living Arrangements assisted living  (memory care)   Transportation Anticipated family or friend will provide   Self-Care   Activity/Exercise/Self-Care Comment Pt states he uses a cane, owns a walker.    Functional Level   Ambulation 1-->assistive equipment   Transferring 1-->assistive equipment   Toileting 3-->assistive equipment and person   Bathing 3-->assistive equipment and person   Dressing 3-->assistive equipment and person   Eating 0-->independent   Communication 0-->understands/communicates without difficulty   Swallowing 0-->swallows foods/liquids without difficulty   Cognition 1 - attention or memory deficits   Fall history within last six months yes   Number of times patient has fallen within last six months 1   Prior Functional Level Comment Gets assistance from [facility] for dressing, bathing, toileting.   General Information   Onset of Illness/Injury or Date of Surgery - Date 03/27/19   Referring Physician Rebecca Alcazar MD   Patient/Family Goals Statement To return to [facility]   Additional Occupational Profile Info/Pertinent History of Current Problem Per H&P: Maxime Powell is a 85 year old male who presents on 3/27/2019 in septic shock.   Precautions/Limitations fall precautions   General Info Comments When walking, tends to leave walker to the side, needs cues to keep walker close.   Cognitive Status Examination   Orientation person   Level of Consciousness alert   Follows Commands (Cognition) follows one step commands;75-90% accuracy   Attention Distractible during evaluation   Range of Motion (ROM)   ROM Comment B UE WFL   Strength   Strength Comments  Strength: WFL, elbow flexion: 4+/5, shoulder flexion 4-/5   Transfer Skill: Sit to Stand   Level of [place]: Sit/Stand contact guard   Physical Assist/Nonphysical Assist: Sit/Stand 1 person assist  "  Assistive Device for Transfer: Sit/Stand standard walker   Transfer Skill: Toilet Transfer   Level of [place]: Toilet contact guard   Physical Assist/Nonphysical Assist: Toilet 1 person assist   Assistive Device grab bars;standard walker   Lower Body Dressing   Level of [place]: Dress Lower Body stand-by assist   Physical Assist/Nonphysical Assist: Dress Lower Body supervision  (don/doff socks)   Instrumental Activities of Daily Living (IADL)   IADL Comments Gets assitance at [facility]   Activities of Daily Living Analysis   Impairments Contributing to Impaired Activities of Daily Living strength decreased   General Therapy Interventions   Planned Therapy Interventions ADL retraining;strengthening;progressive activity/exercise   Clinical Impression   Criteria for Skilled Therapeutic Interventions Met yes, treatment indicated   OT Diagnosis decreased functional mobility and independence in ADLs   Influenced by the following impairments cognition, weakness   Assessment of Occupational Performance 1-3 Performance Deficits   Identified Performance Deficits functional mobility, toileting   Clinical Decision Making (Complexity) Low complexity   Therapy Frequency daily   Predicted Duration of Therapy Intervention (days/wks) 1-2 days   Anticipated Discharge Disposition (Return to memory care [facility])   Risks and Benefits of Treatment have been explained. Yes   Patient, Family & other staff in agreement with plan of care Yes   [facility] AM-PAC  \"6 Clicks\" Daily Activity Inpatient Short Form   1. Putting on and taking off regular lower body clothing? 3 - A Little   2. Bathing (including washing, rinsing, drying)? 3 - A Little   3. Toileting, which includes using toilet, bedpan or urinal? 3 - A Little   4. Putting on and taking off regular upper body clothing? 3 - A Little   5. Taking care of personal grooming such as brushing teeth? 3 - A Little   6. Eating meals? 4 - None   Daily Activity Raw Score (Score out of " 24.Lower scores equate to lower levels of function) 19

## 2019-03-28 NOTE — PROGRESS NOTES
Addendum    lactate returned 5 after ns bolus 1000cc. Will give another bolus and recheck lactate. Of note , lft normal this am, bp improving.    Rebecca Alcazar

## 2019-03-28 NOTE — PROGRESS NOTES
Progress Note - Positive Blood Culture    One of two blood cultures from 3/27/19 positive for gram negative rods.  Current Rx with Zosyn should be adequate therapy.  Patient is showing a response to treatment thus far.    Anmol Miller MD

## 2019-03-29 ENCOUNTER — APPOINTMENT (OUTPATIENT)
Dept: PHYSICAL THERAPY | Facility: CLINIC | Age: 84
DRG: 698 | End: 2019-03-29
Payer: COMMERCIAL

## 2019-03-29 ENCOUNTER — APPOINTMENT (OUTPATIENT)
Dept: OCCUPATIONAL THERAPY | Facility: CLINIC | Age: 84
DRG: 698 | End: 2019-03-29
Payer: COMMERCIAL

## 2019-03-29 ENCOUNTER — APPOINTMENT (OUTPATIENT)
Dept: CT IMAGING | Facility: CLINIC | Age: 84
DRG: 698 | End: 2019-03-29
Attending: INTERNAL MEDICINE
Payer: COMMERCIAL

## 2019-03-29 LAB
ALBUMIN SERPL-MCNC: 2.1 G/DL (ref 3.4–5)
ALBUMIN SERPL-MCNC: 2.1 G/DL (ref 3.4–5)
ALP SERPL-CCNC: 52 U/L (ref 40–150)
ALP SERPL-CCNC: 54 U/L (ref 40–150)
ALT SERPL W P-5'-P-CCNC: 20 U/L (ref 0–70)
ALT SERPL W P-5'-P-CCNC: 21 U/L (ref 0–70)
ANION GAP SERPL CALCULATED.3IONS-SCNC: 6 MMOL/L (ref 3–14)
AST SERPL W P-5'-P-CCNC: 43 U/L (ref 0–45)
AST SERPL W P-5'-P-CCNC: 44 U/L (ref 0–45)
BACTERIA SPEC CULT: ABNORMAL
BILIRUB DIRECT SERPL-MCNC: 0.1 MG/DL (ref 0–0.2)
BILIRUB SERPL-MCNC: 0.5 MG/DL (ref 0.2–1.3)
BILIRUB SERPL-MCNC: 0.5 MG/DL (ref 0.2–1.3)
BUN SERPL-MCNC: 24 MG/DL (ref 7–30)
CALCIUM SERPL-MCNC: 7.4 MG/DL (ref 8.5–10.1)
CHLORIDE SERPL-SCNC: 114 MMOL/L (ref 94–109)
CO2 SERPL-SCNC: 26 MMOL/L (ref 20–32)
CREAT SERPL-MCNC: 1.13 MG/DL (ref 0.66–1.25)
ERYTHROCYTE [DISTWIDTH] IN BLOOD BY AUTOMATED COUNT: 15.2 % (ref 10–15)
GFR SERPL CREATININE-BSD FRML MDRD: 59 ML/MIN/{1.73_M2}
GLUCOSE BLDC GLUCOMTR-MCNC: 149 MG/DL (ref 70–99)
GLUCOSE BLDC GLUCOMTR-MCNC: 156 MG/DL (ref 70–99)
GLUCOSE BLDC GLUCOMTR-MCNC: 230 MG/DL (ref 70–99)
GLUCOSE SERPL-MCNC: 134 MG/DL (ref 70–99)
HCT VFR BLD AUTO: 30.5 % (ref 40–53)
HGB BLD-MCNC: 9.8 G/DL (ref 13.3–17.7)
LACTATE BLD-SCNC: 1.2 MMOL/L (ref 0.7–2)
LIPASE SERPL-CCNC: 65 U/L (ref 73–393)
Lab: ABNORMAL
MCH RBC QN AUTO: 31.4 PG (ref 26.5–33)
MCHC RBC AUTO-ENTMCNC: 32.1 G/DL (ref 31.5–36.5)
MCV RBC AUTO: 98 FL (ref 78–100)
PLATELET # BLD AUTO: 92 10E9/L (ref 150–450)
POTASSIUM SERPL-SCNC: 2.9 MMOL/L (ref 3.4–5.3)
POTASSIUM SERPL-SCNC: 3.4 MMOL/L (ref 3.4–5.3)
PROT SERPL-MCNC: 5.2 G/DL (ref 6.8–8.8)
PROT SERPL-MCNC: 5.3 G/DL (ref 6.8–8.8)
RBC # BLD AUTO: 3.12 10E12/L (ref 4.4–5.9)
SODIUM SERPL-SCNC: 145 MMOL/L (ref 133–144)
SODIUM SERPL-SCNC: 146 MMOL/L (ref 133–144)
SPECIMEN SOURCE: ABNORMAL
TSH SERPL DL<=0.005 MIU/L-ACNC: 1.57 MU/L (ref 0.4–4)
WBC # BLD AUTO: 15.7 10E9/L (ref 4–11)

## 2019-03-29 PROCEDURE — 25000125 ZZHC RX 250: Performed by: INTERNAL MEDICINE

## 2019-03-29 PROCEDURE — 90662 IIV NO PRSV INCREASED AG IM: CPT | Performed by: INTERNAL MEDICINE

## 2019-03-29 PROCEDURE — 83690 ASSAY OF LIPASE: CPT | Performed by: INTERNAL MEDICINE

## 2019-03-29 PROCEDURE — 00000146 ZZHCL STATISTIC GLUCOSE BY METER IP

## 2019-03-29 PROCEDURE — 97116 GAIT TRAINING THERAPY: CPT | Mod: GP | Performed by: PHYSICAL THERAPIST

## 2019-03-29 PROCEDURE — 84295 ASSAY OF SERUM SODIUM: CPT | Performed by: INTERNAL MEDICINE

## 2019-03-29 PROCEDURE — 25000128 H RX IP 250 OP 636: Performed by: INTERNAL MEDICINE

## 2019-03-29 PROCEDURE — 80053 COMPREHEN METABOLIC PANEL: CPT

## 2019-03-29 PROCEDURE — 80076 HEPATIC FUNCTION PANEL: CPT | Performed by: INTERNAL MEDICINE

## 2019-03-29 PROCEDURE — 71260 CT THORAX DX C+: CPT

## 2019-03-29 PROCEDURE — 25000128 H RX IP 250 OP 636: Performed by: FAMILY MEDICINE

## 2019-03-29 PROCEDURE — 25000132 ZZH RX MED GY IP 250 OP 250 PS 637: Performed by: INTERNAL MEDICINE

## 2019-03-29 PROCEDURE — 84132 ASSAY OF SERUM POTASSIUM: CPT | Performed by: INTERNAL MEDICINE

## 2019-03-29 PROCEDURE — 84443 ASSAY THYROID STIM HORMONE: CPT

## 2019-03-29 PROCEDURE — 85027 COMPLETE CBC AUTOMATED: CPT

## 2019-03-29 PROCEDURE — 99233 SBSQ HOSP IP/OBS HIGH 50: CPT | Performed by: INTERNAL MEDICINE

## 2019-03-29 PROCEDURE — 12000000 ZZH R&B MED SURG/OB

## 2019-03-29 PROCEDURE — 83605 ASSAY OF LACTIC ACID: CPT | Performed by: INTERNAL MEDICINE

## 2019-03-29 PROCEDURE — 25800030 ZZH RX IP 258 OP 636: Performed by: INTERNAL MEDICINE

## 2019-03-29 PROCEDURE — 97535 SELF CARE MNGMENT TRAINING: CPT | Mod: GO

## 2019-03-29 RX ORDER — PANTOPRAZOLE SODIUM 40 MG/1
40 TABLET, DELAYED RELEASE ORAL
Status: DISCONTINUED | OUTPATIENT
Start: 2019-03-29 | End: 2019-03-30 | Stop reason: HOSPADM

## 2019-03-29 RX ORDER — NICOTINE POLACRILEX 4 MG
15-30 LOZENGE BUCCAL
Status: DISCONTINUED | OUTPATIENT
Start: 2019-03-29 | End: 2019-03-30 | Stop reason: HOSPADM

## 2019-03-29 RX ORDER — POTASSIUM CHLORIDE 1500 MG/1
20-40 TABLET, EXTENDED RELEASE ORAL
Status: DISCONTINUED | OUTPATIENT
Start: 2019-03-29 | End: 2019-03-30 | Stop reason: HOSPADM

## 2019-03-29 RX ORDER — POTASSIUM CL/LIDO/0.9 % NACL 10MEQ/0.1L
10 INTRAVENOUS SOLUTION, PIGGYBACK (ML) INTRAVENOUS
Status: DISCONTINUED | OUTPATIENT
Start: 2019-03-29 | End: 2019-03-30 | Stop reason: HOSPADM

## 2019-03-29 RX ORDER — CIPROFLOXACIN 500 MG/1
500 TABLET, FILM COATED ORAL EVERY 12 HOURS SCHEDULED
Status: DISCONTINUED | OUTPATIENT
Start: 2019-03-29 | End: 2019-03-30 | Stop reason: HOSPADM

## 2019-03-29 RX ORDER — IOPAMIDOL 755 MG/ML
74 INJECTION, SOLUTION INTRAVASCULAR ONCE
Status: COMPLETED | OUTPATIENT
Start: 2019-03-29 | End: 2019-03-29

## 2019-03-29 RX ORDER — LACTOBACILLUS RHAMNOSUS GG 10B CELL
1 CAPSULE ORAL
Status: DISCONTINUED | OUTPATIENT
Start: 2019-03-29 | End: 2019-03-30 | Stop reason: HOSPADM

## 2019-03-29 RX ORDER — POTASSIUM CHLORIDE 1.5 G/1.58G
20-40 POWDER, FOR SOLUTION ORAL
Status: DISCONTINUED | OUTPATIENT
Start: 2019-03-29 | End: 2019-03-30 | Stop reason: HOSPADM

## 2019-03-29 RX ORDER — POTASSIUM CHLORIDE 29.8 MG/ML
20 INJECTION INTRAVENOUS
Status: DISCONTINUED | OUTPATIENT
Start: 2019-03-29 | End: 2019-03-30 | Stop reason: HOSPADM

## 2019-03-29 RX ORDER — POTASSIUM CHLORIDE 7.45 MG/ML
10 INJECTION INTRAVENOUS
Status: DISCONTINUED | OUTPATIENT
Start: 2019-03-29 | End: 2019-03-30 | Stop reason: HOSPADM

## 2019-03-29 RX ORDER — DEXTROSE MONOHYDRATE 25 G/50ML
25-50 INJECTION, SOLUTION INTRAVENOUS
Status: DISCONTINUED | OUTPATIENT
Start: 2019-03-29 | End: 2019-03-30 | Stop reason: HOSPADM

## 2019-03-29 RX ORDER — L. ACIDOPHILUS/PECTIN, CITRUS 25MM-100MG
1 TABLET ORAL
Status: DISCONTINUED | OUTPATIENT
Start: 2019-03-29 | End: 2019-03-29 | Stop reason: RX

## 2019-03-29 RX ADMIN — IOPAMIDOL 74 ML: 755 INJECTION, SOLUTION INTRAVENOUS at 09:50

## 2019-03-29 RX ADMIN — HYDROCORTISONE SODIUM SUCCINATE 50 MG: 100 INJECTION, POWDER, FOR SOLUTION INTRAMUSCULAR; INTRAVENOUS at 07:47

## 2019-03-29 RX ADMIN — APIXABAN 10 MG: 5 TABLET, FILM COATED ORAL at 20:39

## 2019-03-29 RX ADMIN — APIXABAN 10 MG: 5 TABLET, FILM COATED ORAL at 12:00

## 2019-03-29 RX ADMIN — TAZOBACTAM SODIUM AND PIPERACILLIN SODIUM 3.38 G: 375; 3 INJECTION, SOLUTION INTRAVENOUS at 06:01

## 2019-03-29 RX ADMIN — METFORMIN HYDROCHLORIDE 500 MG: 500 TABLET ORAL at 17:33

## 2019-03-29 RX ADMIN — TAZOBACTAM SODIUM AND PIPERACILLIN SODIUM 3.38 G: 375; 3 INJECTION, SOLUTION INTRAVENOUS at 01:55

## 2019-03-29 RX ADMIN — INFLUENZA A VIRUS A/MICHIGAN/45/2015 X-275 (H1N1) ANTIGEN (FORMALDEHYDE INACTIVATED), INFLUENZA A VIRUS A/SINGAPORE/INFIMH-16-0019/2016 IVR-186 (H3N2) ANTIGEN (FORMALDEHYDE INACTIVATED), AND INFLUENZA B VIRUS B/MARYLAND/15/2016 BX-69A (A B/COLORADO/6/2017-LIKE VIRUS) ANTIGEN (FORMALDEHYDE INACTIVATED) 0.5 ML: 60; 60; 60 INJECTION, SUSPENSION INTRAMUSCULAR at 11:21

## 2019-03-29 RX ADMIN — PANTOPRAZOLE SODIUM 40 MG: 40 TABLET, DELAYED RELEASE ORAL at 10:09

## 2019-03-29 RX ADMIN — Medication 1 CAPSULE: at 11:21

## 2019-03-29 RX ADMIN — CIPROFLOXACIN HYDROCHLORIDE 500 MG: 500 TABLET, FILM COATED ORAL at 20:25

## 2019-03-29 RX ADMIN — POTASSIUM CHLORIDE 40 MEQ: 20 TABLET, EXTENDED RELEASE ORAL at 07:47

## 2019-03-29 RX ADMIN — POTASSIUM CHLORIDE 40 MEQ: 20 TABLET, EXTENDED RELEASE ORAL at 10:09

## 2019-03-29 RX ADMIN — CIPROFLOXACIN HYDROCHLORIDE 500 MG: 500 TABLET, FILM COATED ORAL at 10:09

## 2019-03-29 RX ADMIN — SODIUM CHLORIDE, POTASSIUM CHLORIDE, SODIUM LACTATE AND CALCIUM CHLORIDE: 600; 310; 30; 20 INJECTION, SOLUTION INTRAVENOUS at 05:42

## 2019-03-29 RX ADMIN — SODIUM CHLORIDE 99 ML: 9 INJECTION, SOLUTION INTRAVENOUS at 09:50

## 2019-03-29 RX ADMIN — SODIUM CHLORIDE, POTASSIUM CHLORIDE, SODIUM LACTATE AND CALCIUM CHLORIDE: 600; 310; 30; 20 INJECTION, SOLUTION INTRAVENOUS at 20:48

## 2019-03-29 RX ADMIN — Medication 1 CAPSULE: at 16:33

## 2019-03-29 RX ADMIN — ENOXAPARIN SODIUM 80 MG: 80 INJECTION SUBCUTANEOUS at 07:46

## 2019-03-29 ASSESSMENT — MIFFLIN-ST. JEOR: SCORE: 1511

## 2019-03-29 ASSESSMENT — ACTIVITIES OF DAILY LIVING (ADL)
ADLS_ACUITY_SCORE: 29
ADLS_ACUITY_SCORE: 28

## 2019-03-29 NOTE — PLAN OF CARE
Discharge Planner PT   Patient plan for discharge: back to memory care with PT/OT  Current status: SBA with bed mobility and transfers with cane, Pt amb 150 feet with cane CGA with cues for cane safety, less implusive today  Barriers to return to prior living situation: none  Recommendations for discharge: back to memory care with PT/OT  Rationale for recommendations: improved gait now with cane, near baseline level, PT judgement, will complete orders.   Please walk 3 x day with nursing       Entered by: Neo Chavez 03/29/2019 12:02 PM   Physical Therapy Discharge Summary    Reason for therapy discharge:    All goals and outcomes met, no further needs identified.    Progress towards therapy goal(s). See goals on Care Plan in [facility] electronic health record for goal details.  Goals met    Therapy recommendation(s):    Continue home exercise program.      Please Contact me with any questions or concerns. Thank you for for patience and cooperation.     Neo Chavez PT, DPT  Flexible Workforce Physical Therapist   [facility]  [email or web address]

## 2019-03-29 NOTE — PROGRESS NOTES
BS prior to dinner was 230. Pt normally on metformin at home for DM, which has been held and has been getting steroids. MD paged requesting home metformin/sliding scale be ordered.

## 2019-03-29 NOTE — PROGRESS NOTES
Reason for Follow up: DC Planning    Anticipated discharge needs: This writer spoke with [facility] (phone: [phone] Fax: [phone]) and they reported that pt has am and pm dressing, he is assist of 1 but usually is able to to on his own. Pt has care every 2 hours for cathetar care.  [facility] is able to accept pt back on the weekend. CTS will need to call the on call at [phone] prior to pt returning.     [facility] stating that home care order for therapy would be appropriate on discharge. [email or web address] (phone: [phone] Fax: [phone]) and this pt has a RN from this agency already that comes every other week. Order placed and all preliminary information has been faxed, they are able to accept pt.      This writer did confirm discharge plan with pts son, he is in agreement. He is requesting that when pt is ready for discharge CTS will need to contact him regarding transportation. He may want transport arranged but is not sure at this time.     Next steps: Return to [facility] with home care    Discharge Planner   Discharge Plans in progress: [facility] with home care  Barriers to discharge plan: medical stability  Follow up plan: CTS to follow       Entered by: Johanna Reynaga 03/29/2019 10:53 AM           Johanna LLANES, LICSW, [facility] [phone]

## 2019-03-29 NOTE — PLAN OF CARE
Pt appears comfortable in bed this evening, has no complaints of pain. Pt able to walk to the BR with 1 assist. He does not use the sergio light and the bed alarm is on. Frequent safety checks.

## 2019-03-29 NOTE — PLAN OF CARE
Pt has has several small soft stools in the past 24hours. This am he is c/o abd pain. Unable to be specific/ abd soft, non-distended.  No resp distress noted.pt alert with baseline demntia, calm cooperative .

## 2019-03-29 NOTE — PROGRESS NOTES
[facility]    Hospital Medicine Progress Note  Date of Service: 03/29/2019    Assessment & Plan   Maxime Powell is a 85 year old male who presented on 3/27/2019 with  Fever and sepsis    Assessment & Plan          1. Septic shock due to urinary tract infection  -transferred from [facility] to the [facility] emergency department with a temperature of 102.1, heart rate as high as 130, respiratory rate 28, and initial blood pressure 150/84.  However, blood pressure then dropped in the 70s systolic range, placed on a norepinephrine. Initial lactate was 7.2.  Etiology appears to be urinary tract infection. Was started on zosyn empirically. BY 3/28 off pressors but continued to have elevated lactate and got 3 boluses of ivf 2.5 liters plus maintenance. By today lactate normal.    2.  Urinary tract infection associated with chronic indwelling urinary catheter  -the patient has a chronic indwelling urinary catheter, apparently present due to sequela of prostate cancer.  He has had prior urinary tract infection.   the [pateint is growing our klebsiella from both blood and urine. Urine is sens to cipro so will change to that    3. Heme- leukocytosis, improved today                - anemia-  hgb in 9's and stable. No evidence of bleeding                - thrombocytopenia- could be from sepsis, less likely heparin as seems too soon for HIT.   Will be stopping heparin today and starting eliquis . Will cont  To follow plt      4.Diabetes mellitus, type 2  -  Outpatient regimen appears to be limited to metformin 500 mg p.o. twice daily.  -off metformin due to shock and lactic acidosis.will restart today      5.Hypertension -managed as an outpatient with lisinopril 10 mg PO every day. held lisinopril bc of septic shock, will restart tomorrow.Got iv dye today and in setting of recent ROBERT, will hold off for 24 hrs       6.Rheumatoid arthritis ,   Immunosuppressed status   -outpatient regimen includes hydroxychloroquine, prednisone 7.5 mg p.o. daily, and although it is not on his med list from assisted living, the patient's son says that he is also on methotrexate, dose unknown.  Received stress dose steroids. Now that he is doing well will stop hydrocortisone ( got 50 mg today) and restart prednisone 7.5 tomorrow-Hydroxychloroquine and methotrexate will be held  While active infection treated      7. Acute renal injury due to sepsis  -resolved       8.  Atrial fibrillation with rapid ventricular response NEW. -admission EKG shows atrial fibrillation with rapid ventricular response.tsh nl, no pe by ct.Since still in afib and chadsvasc2 score is 4. Discussed long term anticoag with son Cheri and he thinks eliquis is best option so won't have to check inr  -No rate control needed      9. Dementia- chronic    10. Pulmonary nodule- 1.9 cm per ct. Incidental finding. Discussed with son. He [facility]. We discussed whether or not to pursue an evaluation which might include a biopsy and he felt that it would be best not to pursue bc of his Dads age and dementia    11. Abd pain, upper and it moves around- unclear etiology- has had some soft stools but not watery. He says its better. Checked lipase and lft and they are ok. Will follow for now. protonix     DVT Prophylaxis: m stop lovenox and start eliquis for afib  Code Status: DNR / DNI.  A POLSTwas filled out by son this admit after talking to family members . Treatment should include pressor therapy if needed, and the patient's son gives consent for the placement of a PICC line for delivery of medications, fluids, and pressors.   Lines: Peripheral, though PICC is ordered.  Murcia catheter: Chronic.  Restraints: None.       Disposition: Anticipate discharge possibly tomorrow to [facility].Get ot/pt          Rebecca Alcazar       Interval History    feels good except for abd pain that is improving. No sob  Physical  Exam   Temp:  [97.8  F (36.6  C)-98.4  F (36.9  C)] 97.8  F (36.6  C)  Pulse:  [69-86] 83  Heart Rate:  [65-94] 73  Resp:  [10-24] 19  BP: (116-156)/(68-94) 154/92  SpO2:  [96 %-99 %] 99 %    Weights:   Vitals:    03/27/19 1710 03/28/19 0730 03/29/19 0601   Weight: 72.8 kg (160 lb 7.9 oz) 75.2 kg (165 lb 12.6 oz) 75.9 kg (167 lb 5.3 oz)    Body mass index is 22.69 kg/m .    Constitutional: nad  CV: Regular  Respiratory: CTA bilaterally  GI: Soft, nontender  Skin: Warm and dry  Musculoskeletal:no edema.     Data   Recent Labs   Lab 03/29/19  0548 03/28/19  1233 03/28/19  0520 03/27/19  1226   WBC 15.7*  --  23.5* 10.4   HGB 9.8* 9.0* 10.2* 11.6*   MCV 98  --  97 100   PLT 92*  --  127* 150   *  --  143 142   POTASSIUM 2.9*  --  3.8 4.2   CHLORIDE 114*  --  111* 109   CO2 26  --  24 22   BUN 24  --  26 31*   CR 1.13  --  1.28* 1.65*   ANIONGAP 6  --  8 11   LAISHA 7.4*  --  7.9* 8.7   *  --  149* 142*   ALBUMIN 2.1*  2.1*  --  2.4* 3.2*   PROTTOTAL 5.3*  5.2*  --  5.5* 6.6*   BILITOTAL 0.5  0.5  --  0.5 0.5   ALKPHOS 52  54  --  48 68   ALT 21  20  --  16 18   AST 43  44  --  26 19   LIPASE 65*  --   --   --        Recent Labs   Lab 03/29/19  0548 03/28/19  1719 03/28/19  0520 03/27/19  1821 03/27/19  1226   *  --  149*  --  142*   BGM  --  145*  --  146*  --         Unresulted Labs Ordered in the Past 30 Days of this Admission     Date and Time Order Name Status Description    3/27/2019 1250 Blood culture Preliminary     3/27/2019 1233 Blood culture Preliminary          2/2 pos bld for klebsiella.   ucx pos for klebsiella sens to cipro  Imaging  Recent Results (from the past 24 hour(s))   CT Chest pulmonary embolism w contrast    Narrative    CT CHEST PULMONARY EMBOLISM WITH CONTRAST 3/29/2019 9:58 AM     HISTORY: PE suspected, intermediate probability, positive D-dimer.    CONTRAST DOSE:  74 mL Isovue-370    Radiation dose for this scan was reduced using automated exposure  control, adjustment  of the mA and/or kV according to patient size, or  iterative reconstruction technique.    FINDINGS:  Contrast bolus within the pulmonary artery is adequate. No  pulmonary arterial filling defects are demonstrated to indicate  pulmonary embolism. There is minimal if any contrast within the aorta  precluding evaluation for dissection. Prominent coronary and scattered  aortic calcifications are noted. Mild pericardial thickening or  pericardial effusion is demonstrated. The mediastinum and muna are  otherwise unremarkable. There are small bilateral pleural effusions  with adjacent relaxation atelectasis. 0.4 cm nodule is seen abutting  the superior aspect of the left major fissure. Further proximally,  there is a 1.9 cm irregular opacity or mass within the left upper lobe  abutting the superior margin of the major fissure. The right lung  appears clear. Hepatic fatty infiltration is noted.      Impression    IMPRESSION:  1. No CT evidence of pulmonary embolism.  2. Left upper lobe 1.9 cm irregular opacity abutting the superior  margin of the left major fissure. Neoplastic disease is not excluded.  Immediately inferior to this, there is a nonspecific 0.4 cm nodule as  well.  3. Small bilateral pleural effusions with adjacent atelectasis.  4. Coronary artery and aortic calcification.  5. Mild pericardial effusion or pericardial thickening.  6. Hepatic fatty infiltration--possible etiologies include consumption  of alcohol or excessive carbohydrate intake, especially  sugar/fructose.  Metabolic syndrome commonly occurs in combination  with nonalcoholic fatty liver disease. Although often reversible,  nonalcoholic fatty liver disease can progress to steatohepatitis and  cirrhosis.       ekg admit- afib    Medications     lactated ringers 50 mL/hr at 03/29/19 0747       sodium chloride 0.9%  30 mL/kg Intravenous Once     ciprofloxacin  500 mg Oral Q12H ELLY     enoxaparin  1 mg/kg Subcutaneous Q12H     influenza Vac Split  High-Dose  0.5 mL Intramuscular Prior to discharge     lactobacillus rhamnosus (GG)  1 capsule Oral TID AC     pantoprazole  40 mg Oral QAM AC     [START ON 3/30/2019] predniSONE  7.5 mg Oral Daily     sodium chloride (PF)  10 mL Intracatheter Q7 Days       Rebecca Alcazar

## 2019-03-29 NOTE — PLAN OF CARE
Discharge Planner OT   Patient plan for discharge: Return to [facility]  Current status: Pt ambulating ~7min in hallway with CGA and SEC. At first keeps L hand on IV pole, but able to walk distance back to room with just CGA and SEC for increased ADL tolerance. Sit <> stand and supine <> sit with SBA.    Barriers to return to prior living situation: none  Recommendations for discharge: Return to [facility] with home therapy  Rationale for recommendations: Pt appears and states he's near baseline, has assistance for bathing, dressing, toileting at [facility].     Occupational Therapy Discharge Summary    Reason for therapy discharge:    All goals and outcomes met, no further needs identified.    Progress towards therapy goal(s). See goals on Care Plan in [facility] electronic health record for goal details.  Goals met    Therapy recommendation(s):    Home therapy planned.             Entered by: Radha Chanel 03/29/2019 1:57 PM

## 2019-03-29 NOTE — PROGRESS NOTES
Pt alert, confused, pleasant and cooperative. Blood pressure has remained stable, HR in the 60 to 70's, a- fib. Murcia patent draining good amounts. IVF decreased to 50 ml/hr, weight increased. Lung sound clear, sats stable on room air.  Changed to med surg status no tele.

## 2019-03-30 VITALS
SYSTOLIC BLOOD PRESSURE: 157 MMHG | WEIGHT: 173.94 LBS | BODY MASS INDEX: 23.56 KG/M2 | TEMPERATURE: 98 F | DIASTOLIC BLOOD PRESSURE: 81 MMHG | OXYGEN SATURATION: 96 % | HEIGHT: 72 IN | HEART RATE: 72 BPM | RESPIRATION RATE: 18 BRPM

## 2019-03-30 LAB
ANION GAP SERPL CALCULATED.3IONS-SCNC: 7 MMOL/L (ref 3–14)
BACTERIA SPEC CULT: ABNORMAL
BUN SERPL-MCNC: 19 MG/DL (ref 7–30)
CALCIUM SERPL-MCNC: 7.9 MG/DL (ref 8.5–10.1)
CHLORIDE SERPL-SCNC: 112 MMOL/L (ref 94–109)
CO2 SERPL-SCNC: 27 MMOL/L (ref 20–32)
CREAT SERPL-MCNC: 1.1 MG/DL (ref 0.66–1.25)
ERYTHROCYTE [DISTWIDTH] IN BLOOD BY AUTOMATED COUNT: 15 % (ref 10–15)
GFR SERPL CREATININE-BSD FRML MDRD: 61 ML/MIN/{1.73_M2}
GLUCOSE BLDC GLUCOMTR-MCNC: 177 MG/DL (ref 70–99)
GLUCOSE BLDC GLUCOMTR-MCNC: 231 MG/DL (ref 70–99)
GLUCOSE SERPL-MCNC: 172 MG/DL (ref 70–99)
HCT VFR BLD AUTO: 31 % (ref 40–53)
HGB BLD-MCNC: 10.2 G/DL (ref 13.3–17.7)
Lab: ABNORMAL
MCH RBC QN AUTO: 31.7 PG (ref 26.5–33)
MCHC RBC AUTO-ENTMCNC: 32.9 G/DL (ref 31.5–36.5)
MCV RBC AUTO: 96 FL (ref 78–100)
PLATELET # BLD AUTO: 102 10E9/L (ref 150–450)
PLATELET # BLD AUTO: 93 10E9/L (ref 150–450)
POTASSIUM SERPL-SCNC: 2.9 MMOL/L (ref 3.4–5.3)
POTASSIUM SERPL-SCNC: 3.8 MMOL/L (ref 3.4–5.3)
RBC # BLD AUTO: 3.22 10E12/L (ref 4.4–5.9)
SODIUM SERPL-SCNC: 146 MMOL/L (ref 133–144)
SPECIMEN SOURCE: ABNORMAL
WBC # BLD AUTO: 13.2 10E9/L (ref 4–11)

## 2019-03-30 PROCEDURE — 85027 COMPLETE CBC AUTOMATED: CPT | Performed by: INTERNAL MEDICINE

## 2019-03-30 PROCEDURE — 36415 COLL VENOUS BLD VENIPUNCTURE: CPT | Performed by: INTERNAL MEDICINE

## 2019-03-30 PROCEDURE — 00000146 ZZHCL STATISTIC GLUCOSE BY METER IP

## 2019-03-30 PROCEDURE — 25000132 ZZH RX MED GY IP 250 OP 250 PS 637: Performed by: INTERNAL MEDICINE

## 2019-03-30 PROCEDURE — 25000131 ZZH RX MED GY IP 250 OP 636 PS 637: Performed by: INTERNAL MEDICINE

## 2019-03-30 PROCEDURE — 25000125 ZZHC RX 250: Performed by: INTERNAL MEDICINE

## 2019-03-30 PROCEDURE — 85049 AUTOMATED PLATELET COUNT: CPT | Performed by: INTERNAL MEDICINE

## 2019-03-30 PROCEDURE — 99239 HOSP IP/OBS DSCHRG MGMT >30: CPT | Performed by: INTERNAL MEDICINE

## 2019-03-30 PROCEDURE — 80048 BASIC METABOLIC PNL TOTAL CA: CPT | Performed by: INTERNAL MEDICINE

## 2019-03-30 PROCEDURE — 84132 ASSAY OF SERUM POTASSIUM: CPT | Performed by: INTERNAL MEDICINE

## 2019-03-30 RX ORDER — POTASSIUM CHLORIDE 1.5 G/1.58G
20 POWDER, FOR SOLUTION ORAL 2 TIMES DAILY
Qty: 4 PACKET | Refills: 0 | Status: ON HOLD | OUTPATIENT
Start: 2019-03-30 | End: 2019-09-22

## 2019-03-30 RX ORDER — HYDROXYCHLOROQUINE SULFATE 200 MG/1
200 TABLET, FILM COATED ORAL EVERY MORNING
Start: 2019-03-30

## 2019-03-30 RX ORDER — LACTOBACILLUS RHAMNOSUS GG 10B CELL
1 CAPSULE ORAL
Qty: 30 CAPSULE | Refills: 0 | Status: ON HOLD | OUTPATIENT
Start: 2019-03-30 | End: 2019-09-22

## 2019-03-30 RX ORDER — CIPROFLOXACIN 500 MG/1
500 TABLET, FILM COATED ORAL EVERY 12 HOURS
Qty: 8 TABLET | Refills: 0 | Status: ON HOLD | OUTPATIENT
Start: 2019-03-30 | End: 2019-09-22

## 2019-03-30 RX ADMIN — PANTOPRAZOLE SODIUM 40 MG: 40 TABLET, DELAYED RELEASE ORAL at 06:47

## 2019-03-30 RX ADMIN — METFORMIN HYDROCHLORIDE 500 MG: 500 TABLET ORAL at 08:27

## 2019-03-30 RX ADMIN — POTASSIUM CHLORIDE 40 MEQ: 20 TABLET, EXTENDED RELEASE ORAL at 10:40

## 2019-03-30 RX ADMIN — PREDNISONE 7.5 MG: 2.5 TABLET ORAL at 08:27

## 2019-03-30 RX ADMIN — Medication 1 CAPSULE: at 10:42

## 2019-03-30 RX ADMIN — APIXABAN 10 MG: 5 TABLET, FILM COATED ORAL at 08:27

## 2019-03-30 RX ADMIN — INSULIN ASPART 2 UNITS: 100 INJECTION, SOLUTION INTRAVENOUS; SUBCUTANEOUS at 11:24

## 2019-03-30 RX ADMIN — CIPROFLOXACIN HYDROCHLORIDE 500 MG: 500 TABLET, FILM COATED ORAL at 08:27

## 2019-03-30 RX ADMIN — Medication 1 CAPSULE: at 16:04

## 2019-03-30 RX ADMIN — Medication 1 CAPSULE: at 06:47

## 2019-03-30 RX ADMIN — POTASSIUM CHLORIDE 40 MEQ: 20 TABLET, EXTENDED RELEASE ORAL at 12:09

## 2019-03-30 ASSESSMENT — ACTIVITIES OF DAILY LIVING (ADL)
ADLS_ACUITY_SCORE: 28

## 2019-03-30 ASSESSMENT — MIFFLIN-ST. JEOR: SCORE: 1512

## 2019-03-30 NOTE — PROGRESS NOTES
Care Transitions Progress note    Reason for Follow up: This writer left message with [facility] on call nurse updating with patients dc for today.  Also Spoke with the patients son Cheri, he will transport the patient home upon discharge.    Anticipated DC Needs: Oral abx    Next Steps: PCP    Maile Alvarez RN Care Coordinator  [facility] [phone]  [facility] [phone]      ADDENDUM: This writer spoke with the nurse/June on the floor at the [facility], they can receive medication deliveries until 5pm on weekends from their pharmacist at [place]. Please fax orders to their first floor fax line [phone]). She will be faxing the new med list to their pharmacy asap.

## 2019-03-30 NOTE — PLAN OF CARE
[facility] DISCHARGE NOTE    Patient discharged to assisted living at 4:50 PM via wheel chair. Accompanied by son and staff. Discharge instructions reviewed with patient, opportunity offered to ask questions. Prescriptions sent to patients preferred pharmacy. All belongings sent with patient.    Sylvia Bhakta

## 2019-03-30 NOTE — PROGRESS NOTES
Received report from Molly. Pt transferred to room 2302 via w/c. Alert to self. Able to stand with walker, gait belt & 2. Ambulated to bed without problem. Lungs clear, denies pain. Murcia patent. IVF infusing through PICC line. Bed alarm on for safety.

## 2019-03-30 NOTE — PROGRESS NOTES
[facility] TRANSPORT NOTE  Data:   Reason for Transport:  Stable condition    Maxime Powell was transported from ICU 1 via wheel chair at 2200.  Patient was accompanied by Nursing Assistant. Equipment used for transport: IV pump. Family was aware of reason for transport: yes    Action:  Report: given to Jenae AGUILAR RN     Response:  Patient's condition when transferred off unit was Stable.    Molly Turner

## 2019-03-30 NOTE — DISCHARGE SUMMARY
[facility]    Discharge Summary  Hospital Medicine    Date of Admission:  3/27/2019  Date of Discharge:  3/30/2019   Discharging Provider: Rebecca Alcazar  Date of Service: 3/30/2019     Primary Care     Center, [facility] [id number]    Maxime Powell is a 85 year old male who presented on 3/27/2019 with  Fever and sepsis     Assessment & Plan            1. Septic shock due to urinary tract infection, resolved  -transferred from [facility] to the [facility] emergency department with a temperature of 102.1, heart rate as high as 130, respiratory rate 28, and initial blood pressure 150/84.  However, blood pressure then dropped in the 70s systolic range, placed on a norepinephrine. Initial lactate was 7.2.  Etiology appears to be urinary tract infection. Was started on zosyn empirically. BY 3/28 off pressors but continued to have elevated lactate and got 3 boluses of ivf 2.5 liters total  plus maintenance. By 3/29 lactate normal.continues to do well     2.  Urinary tract infection associated with chronic indwelling urinary catheter  -the patient has a chronic indwelling urinary catheter, apparently present due to sequela of prostate cancer.  He has had prior urinary tract infection.   the [pateint is growing our klebsiella from both blood and urine.  Both blood and urine have same sensitivities. Was switched to cipro 3/29 and will complete a 7 day course     3. Heme- leukocytosis, improved today                - anemia-  hgb in 9's and stable. No evidence of bleeding                - thrombocytopenia- could be from sepsis, less likely heparin as seems too soon for HIT.   plt continue to be borderline  and were normal on admit. For now will just follow at discharge but if dont return to normal, will need further eval. No bleeding     4.Diabetes mellitus, type 2  -  Outpatient regimen appears to be limited to metformin 500 mg  p.o. twice daily.  -off metformin due to shock and lactic acidosis. restarted today 3/29       5.Hypertension -managed as an outpatient with lisinopril 10 mg PO every day. held lisinopril bc of septic shock,  restart at d/c       6.Rheumatoid arthritis ,   Immunosuppressed status  -outpatient regimen includes hydroxychloroquine, prednisone 7.5 mg p.o. daily, and although it is not on his med list from assisted living, the patient's son says that he is also on methotrexate, dose unknown.  Received stress dose steroids. 3/29 stopped hydrocortisone ( got 50 mg today) and restart prednisone 7.5  3/30-Hydroxychloroquine and methotrexate will be held  While active infection treated and restart 4/5      7. Acute renal injury due to sepsis  -resolved        8.  Atrial fibrillation with rapid ventricular response NEW. -admission EKG shows atrial fibrillation with rapid ventricular response.tsh nl, no pe by ct.Since still in afib and chadsvasc2 score is 4. Discussed long term anticoag with son Cheri and he thinks eliquis is best option so won't have to check inr. Rates have been under 100 since sepsis treated.  For now have stopped eliquis bc of thrombocytopenia with plt <100.  I would hold off on restarting eliquis until plt counts recover to avoid increased risk of bleeding  -No rate control needed        9. Dementia- chronic     10. Pulmonary nodule- 1.9 cm per ct. Incidental finding. Discussed with son. He [facility]. We discussed whether or not to pursue an evaluation which might include a biopsy and he felt that it would be best not to pursue bc of his Dads age and dementia     11. Abd pain, resolved- unclear etiology- has had some soft stools but not watery. He says its better. Checked lipase and lft and they are ok.  Started lactobacillus       12. Hypokalemia- k 2.9 3/30. Replacing and will recheck before discharge . Will give 2 days replacement at home and have recheck K on  4/1 and next week        Discharge Disposition   [facility] with home care    Discharge Orders      Home Care Referral      Home Care PT Referral for Hospital Discharge      Home Care OT Referral for Hospital Discharge      Reason for your hospital stay    Urinary tract infection and sepsis     Follow-up and recommended labs and tests     Needs to be seen by primary doctor 1 week. Needs cbc one week and K+.  Needs K lab 4/1     Discharge Medications   Current Discharge Medication List      START taking these medications    Details   ciprofloxacin (CIPRO) 500 MG tablet Take 1 tablet (500 mg) by mouth every 12 hours for 4 days  Qty: 8 tablet, Refills: 0    Associated Diagnoses: Urinary tract infection associated with indwelling urethral catheter, initial encounter (H)      lactobacillus rhamnosus, GG, (CULTURELL) capsule Take 1 capsule by mouth 3 times daily (before meals) for 10 days  Qty: 30 capsule, Refills: 0    Associated Diagnoses: Urinary tract infection associated with indwelling urethral catheter, initial encounter (H)      potassium chloride (KLOR-CON) 20 MEQ packet Take 20 mEq by mouth 2 times daily for 2 days  Qty: 4 packet, Refills: 0    Associated Diagnoses: Hypokalemia         CONTINUE these medications which have CHANGED    Details   adalimumab (HUMIRA) 40 MG/0.8ML prefilled syringe kit Inject 0.8 mLs (40 mg) Subcutaneous every 14 days    Comments: Hold this medicine until 4/5 and then can restart  Associated Diagnoses: Rheumatoid arthritis, involving unspecified site, unspecified rheumatoid factor presence (H)      hydroxychloroquine (PLAQUENIL) 200 MG tablet Take 1 tablet (200 mg) by mouth every morning    Comments: Hold this medicine until 4/5 and then restart  Associated Diagnoses: Rheumatoid arthritis, involving unspecified site, unspecified rheumatoid factor presence (H)         CONTINUE these medications which have NOT CHANGED    Details   Artificial Saliva (BIOTENE MOISTURIZING MOUTH MT) Take 1 spray  by mouth 2 times daily      clotrimazole (LOTRIMIN) 1 % external cream Apply topically 2 times daily To penis until healed      lisinopril (PRINIVIL/ZESTRIL) 20 MG tablet Take 10 mg by mouth every morning       metFORMIN (GLUCOPHAGE) 500 MG tablet Take One tablet (500 mg) by mouth twice daily      mirtazapine (REMERON) 15 MG tablet Take 7.5 mg by mouth At Bedtime      Multiple Vitamins-Minerals (ICAPS AREDS 2) CAPS Take 1 capsule by mouth every morning      predniSONE (DELTASONE) 5 MG tablet Take 7.5 mg by mouth daily (with breakfast) .      tamsulosin (FLOMAX) 0.4 MG capsule Take 0.8 mg by mouth At Bedtime            Allergies   Allergies   Allergen Reactions     Finasteride      Other reaction(s): Gynecomastia  Required tamoxifen treatment     Penicillins      Other reaction(s): Edema, Erythema     Gold Rash     Injectable gold       Consultations This Hospital Stay     VASCULAR ACCESS ADULT IP CONSULT  OCCUPATIONAL THERAPY ADULT IP CONSULT  PHYSICAL THERAPY ADULT IP CONSULT  SOCIAL WORK IP CONSULT  CARE TRANSITION RN/SW IP CONSULT    Significant Results and Procedures   Procedures        Data   Results for orders placed or performed during the hospital encounter of 03/27/19   XR Chest Port 1 View    Narrative    CHEST PORTABLE ONE VIEW March 27, 2019 1:32 PM     HISTORY: Fever.      Impression    IMPRESSION: Interstitial prominence likely representing chronic  fibrosis. No pulmonary consolidation is demonstrated. Right  glenohumeral joint reverse arthroplasty is noted.    RICKY SANTILLAN MD   Chest  XR, 1 view portable    Narrative    XR CHEST PORT 1 VW   3/27/2019 4:49 PM     HISTORY: short of breath   low b/p    COMPARISON: 3/27/2019      Impression    IMPRESSION: No significant change. Again noted is interstitial  prominence of the lungs. Question central pulmonary vascular  congestion. Correlate clinically for signs and symptoms of CHF.    PAM BENITEZ MD   XR Chest Port 1 View    Narrative    CHEST ONE  VIEW PORTABLE    3/27/2019 7:26 PM     HISTORY: Confirm PICC placement.    COMPARISON: 3/27/2019      Impression    IMPRESSION: Left PICC tip is in the lower superior vena cava near the  atriocaval junction in expected position. Lung volumes are unchanged.  No new opacities are identified. Cardiac silhouette is enlarged,  unchanged. Right shoulder prosthesis noted.    GRAHAM HINOJOSA MD   CT Chest pulmonary embolism w contrast    Narrative    CT CHEST PULMONARY EMBOLISM WITH CONTRAST 3/29/2019 9:58 AM     HISTORY: PE suspected, intermediate probability, positive D-dimer.    CONTRAST DOSE:  74 mL Isovue-370    Radiation dose for this scan was reduced using automated exposure  control, adjustment of the mA and/or kV according to patient size, or  iterative reconstruction technique.    FINDINGS:  Contrast bolus within the pulmonary artery is adequate. No  pulmonary arterial filling defects are demonstrated to indicate  pulmonary embolism. There is minimal if any contrast within the aorta  precluding evaluation for dissection. Prominent coronary and scattered  aortic calcifications are noted. Mild pericardial thickening or  pericardial effusion is demonstrated. The mediastinum and muna are  otherwise unremarkable. There are small bilateral pleural effusions  with adjacent relaxation atelectasis. 0.4 cm nodule is seen abutting  the superior aspect of the left major fissure. Further proximally,  there is a 1.9 cm irregular opacity or mass within the left upper lobe  abutting the superior margin of the major fissure. The right lung  appears clear. Hepatic fatty infiltration is noted.      Impression    IMPRESSION:  1. No CT evidence of pulmonary embolism.  2. Left upper lobe 1.9 cm irregular opacity abutting the superior  margin of the left major fissure. Neoplastic disease is not excluded.  Immediately inferior to this, there is a nonspecific 0.4 cm nodule as  well.  3. Small bilateral pleural effusions with adjacent  atelectasis.  4. Coronary artery and aortic calcification.  5. Mild pericardial effusion or pericardial thickening.  6. Hepatic fatty infiltration--possible etiologies include consumption  of alcohol or excessive carbohydrate intake, especially  sugar/fructose.  Metabolic syndrome commonly occurs in combination  with nonalcoholic fatty liver disease. Although often reversible,  nonalcoholic fatty liver disease can progress to steatohepatitis and  cirrhosis.    RICKY SANTILLAN MD       Physical Exam   Temp:  [97.3  F (36.3  C)-98  F (36.7  C)] 98  F (36.7  C)  Pulse:  [68-82] 72  Resp:  [18-20] 18  BP: (131-169)/() 157/81  SpO2:  [95 %-98 %] 96 %  Vitals:    03/29/19 0601 03/29/19 2213 03/30/19 0629   Weight: 75.9 kg (167 lb 5.3 oz) 78.8 kg (173 lb 11.6 oz) 78.9 kg (173 lb 15.1 oz)       Constitutional: nad  CV: Regular  Respiratory: CTA bilaterally  GI: Soft, nontender  Skin: Warm and dry      The discharge plan was discussed with the patient     Total time on this discharge was 35 min    Rebecca Alcazar

## 2019-03-30 NOTE — PLAN OF CARE
Pt alert, oriented to self. Assist 1 with walker. IV infusing. PICC patent. Denies pain, nausea, SOB. LS clear. Murcia in place and patent. /81 (BP Location: Right arm)   Pulse 72   Temp 98  F (36.7  C) (Oral)   Resp 18   Ht 1.829 m (6')   Wt 78.9 kg (173 lb 15.1 oz)   SpO2 96%   BMI 23.59 kg/m

## 2019-03-30 NOTE — PLAN OF CARE
Pt up in chair for breakfast and lunch. Tolerating PO food  & fluids well. Murcia draining clear yellow urine. Sleeping intermittently, in between meals. Pt to discharge to [facility] at 1630 via son for transport. Ni Galvan RN

## 2019-04-01 ENCOUNTER — RECORDS - HEALTHEAST (OUTPATIENT)
Dept: LAB | Facility: CLINIC | Age: 84
End: 2019-04-01

## 2019-04-01 LAB — POTASSIUM BLD-SCNC: 3.7 MMOL/L (ref 3.5–5)

## 2019-04-09 ENCOUNTER — RECORDS - HEALTHEAST (OUTPATIENT)
Dept: LAB | Facility: CLINIC | Age: 84
End: 2019-04-09

## 2019-04-09 LAB
ERYTHROCYTE [DISTWIDTH] IN BLOOD BY AUTOMATED COUNT: 14.8 % (ref 11–14.5)
HCT VFR BLD AUTO: 35.1 % (ref 40–54)
HGB BLD-MCNC: 11.2 G/DL (ref 14–18)
MCH RBC QN AUTO: 30.8 PG (ref 27–34)
MCHC RBC AUTO-ENTMCNC: 31.9 G/DL (ref 32–36)
MCV RBC AUTO: 96 FL (ref 80–100)
PLATELET # BLD AUTO: 326 THOU/UL (ref 140–440)
PMV BLD AUTO: 9.8 FL (ref 8.5–12.5)
POTASSIUM BLD-SCNC: 3.7 MMOL/L (ref 3.5–5)
RBC # BLD AUTO: 3.64 MILL/UL (ref 4.4–6.2)
WBC: 8.3 THOU/UL (ref 4–11)

## 2019-04-15 ENCOUNTER — AMBULATORY - HEALTHEAST (OUTPATIENT)
Dept: OTHER | Facility: CLINIC | Age: 84
End: 2019-04-15

## 2019-04-15 ENCOUNTER — DOCUMENTATION ONLY (OUTPATIENT)
Dept: OTHER | Facility: CLINIC | Age: 84
End: 2019-04-15

## 2019-04-19 ENCOUNTER — RECORDS - HEALTHEAST (OUTPATIENT)
Dept: LAB | Facility: CLINIC | Age: 84
End: 2019-04-19

## 2019-04-19 LAB
ALBUMIN UR-MCNC: ABNORMAL MG/DL
APPEARANCE UR: ABNORMAL
BACTERIA #/AREA URNS HPF: ABNORMAL HPF
BILIRUB UR QL STRIP: NEGATIVE
COLOR UR AUTO: YELLOW
GLUCOSE UR STRIP-MCNC: NEGATIVE MG/DL
HGB UR QL STRIP: ABNORMAL
KETONES UR STRIP-MCNC: NEGATIVE MG/DL
LEUKOCYTE ESTERASE UR QL STRIP: ABNORMAL
MUCOUS THREADS #/AREA URNS LPF: ABNORMAL LPF
NITRATE UR QL: POSITIVE
PH UR STRIP: 5.5 [PH] (ref 4.5–8)
RBC #/AREA URNS AUTO: ABNORMAL HPF
SP GR UR STRIP: 1.02 (ref 1–1.03)
SQUAMOUS #/AREA URNS AUTO: ABNORMAL LPF
UROBILINOGEN UR STRIP-ACNC: ABNORMAL
WBC #/AREA URNS AUTO: ABNORMAL HPF
WBC CLUMPS #/AREA URNS HPF: PRESENT /[HPF]
YEAST #/AREA URNS HPF: ABNORMAL HPF
YEAST #/AREA URNS HPF: ABNORMAL HPF

## 2019-04-22 LAB
BACTERIA SPEC CULT: ABNORMAL
BACTERIA SPEC CULT: ABNORMAL

## 2019-04-23 ENCOUNTER — RECORDS - HEALTHEAST (OUTPATIENT)
Dept: LAB | Facility: CLINIC | Age: 84
End: 2019-04-23

## 2019-04-23 LAB
ANION GAP SERPL CALCULATED.3IONS-SCNC: 7 MMOL/L (ref 5–18)
BUN SERPL-MCNC: 16 MG/DL (ref 8–28)
CALCIUM SERPL-MCNC: 9.2 MG/DL (ref 8.5–10.5)
CHLORIDE BLD-SCNC: 103 MMOL/L (ref 98–107)
CO2 SERPL-SCNC: 28 MMOL/L (ref 22–31)
CREAT SERPL-MCNC: 0.85 MG/DL (ref 0.7–1.3)
GFR SERPL CREATININE-BSD FRML MDRD: >60 ML/MIN/1.73M2
GLUCOSE BLD-MCNC: 212 MG/DL (ref 70–125)
POTASSIUM BLD-SCNC: 4.2 MMOL/L (ref 3.5–5)
SODIUM SERPL-SCNC: 138 MMOL/L (ref 136–145)

## 2019-04-24 LAB — HBA1C MFR BLD: 8.3 % (ref 4.2–6.1)

## 2019-05-16 ENCOUNTER — RECORDS - HEALTHEAST (OUTPATIENT)
Dept: LAB | Facility: CLINIC | Age: 84
End: 2019-05-16

## 2019-05-16 LAB
ALBUMIN SERPL-MCNC: 2.8 G/DL (ref 3.5–5)
ALP SERPL-CCNC: 59 U/L (ref 45–120)
ALT SERPL W P-5'-P-CCNC: <9 U/L (ref 0–45)
AST SERPL W P-5'-P-CCNC: 13 U/L (ref 0–40)
BILIRUB SERPL-MCNC: 0.4 MG/DL (ref 0–1)
BUN SERPL-MCNC: 14 MG/DL (ref 8–28)
CREAT SERPL-MCNC: 0.9 MG/DL (ref 0.7–1.3)
ERYTHROCYTE [DISTWIDTH] IN BLOOD BY AUTOMATED COUNT: 14.8 % (ref 11–14.5)
GFR SERPL CREATININE-BSD FRML MDRD: >60 ML/MIN/1.73M2
HCT VFR BLD AUTO: 33.6 % (ref 40–54)
HGB BLD-MCNC: 10.7 G/DL (ref 14–18)
MCH RBC QN AUTO: 29.4 PG (ref 27–34)
MCHC RBC AUTO-ENTMCNC: 31.8 G/DL (ref 32–36)
MCV RBC AUTO: 92 FL (ref 80–100)
PLATELET # BLD AUTO: 227 THOU/UL (ref 140–440)
PMV BLD AUTO: 10.3 FL (ref 8.5–12.5)
RBC # BLD AUTO: 3.64 MILL/UL (ref 4.4–6.2)
WBC: 7.9 THOU/UL (ref 4–11)

## 2019-05-28 ENCOUNTER — HOSPITAL ENCOUNTER (EMERGENCY)
Facility: CLINIC | Age: 84
Discharge: HOME | End: 2019-05-29
Attending: EMERGENCY MEDICINE | Admitting: EMERGENCY MEDICINE
Payer: COMMERCIAL

## 2019-05-28 VITALS
OXYGEN SATURATION: 97 % | HEART RATE: 70 BPM | SYSTOLIC BLOOD PRESSURE: 158 MMHG | RESPIRATION RATE: 20 BRPM | BODY MASS INDEX: 23.6 KG/M2 | TEMPERATURE: 97.6 F | WEIGHT: 174 LBS | DIASTOLIC BLOOD PRESSURE: 94 MMHG

## 2019-05-28 DIAGNOSIS — Z46.6 URINARY CATHETER (FOLEY) CHANGE REQUIRED: ICD-10-CM

## 2019-05-28 PROCEDURE — 99283 EMERGENCY DEPT VISIT LOW MDM: CPT | Mod: 25 | Performed by: EMERGENCY MEDICINE

## 2019-05-28 PROCEDURE — 99282 EMERGENCY DEPT VISIT SF MDM: CPT | Mod: Z6 | Performed by: EMERGENCY MEDICINE

## 2019-05-28 PROCEDURE — 51702 INSERT TEMP BLADDER CATH: CPT | Performed by: EMERGENCY MEDICINE

## 2019-05-28 PROCEDURE — 25000125 ZZHC RX 250: Performed by: EMERGENCY MEDICINE

## 2019-05-28 RX ORDER — LIDOCAINE HYDROCHLORIDE 20 MG/ML
JELLY TOPICAL ONCE
Status: COMPLETED | OUTPATIENT
Start: 2019-05-28 | End: 2019-05-28

## 2019-05-28 RX ADMIN — LIDOCAINE HYDROCHLORIDE: 20 JELLY TOPICAL at 23:55

## 2019-05-28 NOTE — ED AVS SNAPSHOT
[facility] Emergency Department  [address] [address]  Phone:  [phone]  Fax:  [phone]                                    Maxime Powell   MRN: [id number]    Department:  [facility] Emergency Department   Date of Visit:  5/28/2019           After Visit Summary Signature Page    I have received my discharge instructions, and my questions have been answered. I have discussed any challenges I see with this plan with the nurse or doctor.    ..........................................................................................................................................  Patient/Patient Representative Signature      ..........................................................................................................................................  Patient Representative Print Name and Relationship to Patient    ..................................................               ................................................  Date                                   Time    ..........................................................................................................................................  Reviewed by Signature/Title    ...................................................              ..............................................  Date                                               Time          22EPIC Rev 08/18

## 2019-05-29 ASSESSMENT — ENCOUNTER SYMPTOMS
WOUND: 0
FEVER: 0
DIZZINESS: 0
NUMBNESS: 0
APPETITE CHANGE: 0
DYSURIA: 0
WEAKNESS: 0
CHEST TIGHTNESS: 0
NECK STIFFNESS: 1
ABDOMINAL PAIN: 0

## 2019-05-29 NOTE — ED NOTES
5 calls placed to AL facility.  Spoke with staff on call to notify them the patient has a martinez and was enroute home

## 2019-05-29 NOTE — ED PROVIDER NOTES
History     Chief Complaint   Patient presents with     Urinary Problem     pulled martinez     HPI  Maxime Powell is a 85 year old male with dementia and urinary retention presenting for evaluation of an accidental Martinez catheter removal.  Per report, staff found the catheter adjacent to his bed.  No apparent bleeding or trauma around the pain is reported.  Sent in for catheter replacement.  Patient has no specific complaints in the ED.    Allergies:  Allergies   Allergen Reactions     Finasteride      Other reaction(s): Gynecomastia  Required tamoxifen treatment     Penicillins      Other reaction(s): Edema, Erythema     Gold Rash     Injectable gold       Problem List:    Patient Active Problem List    Diagnosis Date Noted     Septic shock due to urinary tract infection (H) 03/27/2019     Priority: Medium     Urinary tract infection associated with indwelling urinary catheter (H) 03/27/2019     Priority: Medium     Acute renal injury due to sepsis (H) 03/27/2019     Priority: Medium     Immunosuppressed status (H) 03/27/2019     Priority: Medium     Atrial fibrillation with rapid ventricular response (H) 03/27/2019     Priority: Medium     Septic shock (H) 03/27/2019     Priority: Medium     Coronary atherosclerosis 01/31/2018     Priority: Medium     Cough 01/31/2018     Priority: Medium     Elevated prostate specific antigen (PSA) 01/31/2018     Priority: Medium     Lumbago 01/31/2018     Priority: Medium     Migraine headache 01/31/2018     Priority: Medium     Nocturia 01/31/2018     Priority: Medium     Olecranon bursitis 01/31/2018     Priority: Medium     Other symptoms involving digestive system(787.99) 01/31/2018     Priority: Medium     Pain in joint, shoulder region 01/31/2018     Priority: Medium     Urinary frequency 01/31/2018     Priority: Medium     Aftercare following surgery of the musculoskeletal system 11/04/2014     Priority: Medium     Acute urinary retention 05/07/2014     Priority:  Medium     ASCVD (arteriosclerotic cardiovascular disease) 05/07/2014     Priority: Medium     Overview:   S/p RCA stenting 2001       Diabetes mellitus, type 2 (H) 05/07/2014     Priority: Medium     Hyperlipidemia 05/07/2014     Priority: Medium     Hypertension 05/07/2014     Priority: Medium     Rheumatoid arthritis (H) 05/07/2014     Priority: Medium     Osteoarthritis of glenohumeral joint 02/13/2014     Priority: Medium     Prostate cancer (H) 08/27/2013     Priority: Medium     Overview:   S/p radition tx 1987 in [place]           Past Medical History:    Past Medical History:   Diagnosis Date     Atherosclerotic heart disease of native coronary artery without angina pectoris      Essential (primary) hypertension      Hyperlipidemia      Mononeuropathy of left lower extremity      Osteoarthritis      Personal history of malignant neoplasm of prostate      RA (rheumatoid arthritis) (H)      Rheumatoid arthritis (H)      Sjogren-Ashwin syndrome      Strain of muscle, fascia and tendon of long head of biceps, unspecified arm, initial encounter      Type 2 diabetes mellitus without complications (H)        Past Surgical History:    Past Surgical History:   Procedure Laterality Date     APPENDECTOMY OPEN      1960     ARTHROSCOPY SHOULDER ROTATOR CUFF REPAIR      2002,right, Jim Chanel MD     ARTHROTOMY WRIST      1986     COLONOSCOPY      2014     HEART CATH, ANGIOPLASTY      2001,right coronary artery     OTHER SURGICAL HISTORY      05/07/2014,[id number],SHOULDER REPLACEMENT,Right,reverse     OTHER SURGICAL HISTORY      76284.0,NM BONE SCAN 3 PHASE,09/06/13 negative for  metastases     OTHER SURGICAL HISTORY      755888,OTHER,no evidence of metastases     OTHER SURGICAL HISTORY      594538,OTHER     OTHER SURGICAL HISTORY      495614,OTHER,right       Family History:    Family History   Problem Relation Age of Onset     Cancer Father         Cancer,throat cancer     Breast Cancer Sister         Cancer-breast        Social History:  Marital Status:   [5]  Social History     Tobacco Use     Smoking status: Former Smoker     Years: 30.00     Types: Cigarettes     Last attempt to quit: 1980     Years since quittin.4     Smokeless tobacco: Former User     Types: Chew     Quit date: 2014     Tobacco comment: Quit smoking: quit smoking 33 years ago. Chew's daily   Substance Use Topics     Alcohol use: No     Drug use: No     Types: Other     Comment: Drug use: No        Medications:      adalimumab (HUMIRA) 40 MG/0.8ML prefilled syringe kit   Artificial Saliva (BIOTENE MOISTURIZING MOUTH MT)   clotrimazole (LOTRIMIN) 1 % external cream   hydroxychloroquine (PLAQUENIL) 200 MG tablet   lisinopril (PRINIVIL/ZESTRIL) 20 MG tablet   metFORMIN (GLUCOPHAGE) 500 MG tablet   mirtazapine (REMERON) 15 MG tablet   Multiple Vitamins-Minerals (ICAPS AREDS 2) CAPS   predniSONE (DELTASONE) 5 MG tablet   tamsulosin (FLOMAX) 0.4 MG capsule         Review of Systems   Constitutional: Negative for appetite change and fever.   HENT: Negative for congestion.    Respiratory: Negative for chest tightness.    Cardiovascular: Negative for chest pain.   Gastrointestinal: Negative for abdominal pain.   Genitourinary: Negative for decreased urine volume and dysuria.   Musculoskeletal: Positive for neck stiffness.   Skin: Negative for wound.   Neurological: Negative for dizziness, weakness and numbness.   All other systems reviewed and are negative.      Physical Exam   BP: (!) 158/94  Pulse: 70  Temp: 97.6  F (36.4  C)  Resp: 20  Weight: 78.9 kg (174 lb)  SpO2: 97 %      Physical Exam   Constitutional: He appears well-developed and well-nourished. No distress.   HENT:   Head: Normocephalic and atraumatic.   Cardiovascular: Normal rate.   Pulmonary/Chest: Effort normal.   Abdominal: Soft. There is no tenderness.   Neurological: He is alert.   Mildly confused to date and place   Skin: Skin is warm and dry. Capillary refill takes less than  2 seconds. He is not diaphoretic.   Psychiatric: He has a normal mood and affect.   Nursing note and vitals reviewed.      ED Course        Procedures            No results found for this or any previous visit (from the past 24 hour(s)).    Medications   lidocaine (XYLOCAINE) 2 % external gel ( Topical Given 5/28/19 6752)       Assessments & Plan (with Medical Decision Making)  85-year-old male with history of dementia and urinary retention for which she has a chronic indwelling Murcia catheter present for evaluate removal of the catheter.  Exactly how was removed is unclear however did not appear to be traumatic.  Patient has no specific complaints of pain.  On exam he had no blood around the meatus.  Murcia catheter replaced without difficulty in the ED and patient returned to his nursing home memory care unit     I have reviewed the nursing notes.    I have reviewed the findings, diagnosis, plan and need for follow up with the patient.          Medication List      There are no discharge medications for this visit.         Final diagnoses:   Urinary catheter (Murcia) change required - due to catheter accidental removal       5/28/2019   [facility] EMERGENCY DEPARTMENT     Kelley, Derek Myaa MD  05/29/19 0025

## 2019-06-07 ENCOUNTER — DOCUMENTATION ONLY (OUTPATIENT)
Dept: OTHER | Facility: CLINIC | Age: 84
End: 2019-06-07

## 2019-06-07 ENCOUNTER — AMBULATORY - HEALTHEAST (OUTPATIENT)
Dept: OTHER | Facility: CLINIC | Age: 84
End: 2019-06-07

## 2019-07-04 ENCOUNTER — HOSPITAL ENCOUNTER (EMERGENCY)
Facility: CLINIC | Age: 84
Discharge: HOME | End: 2019-07-04
Attending: FAMILY MEDICINE | Admitting: FAMILY MEDICINE
Payer: COMMERCIAL

## 2019-07-04 VITALS
RESPIRATION RATE: 18 BRPM | SYSTOLIC BLOOD PRESSURE: 142 MMHG | DIASTOLIC BLOOD PRESSURE: 81 MMHG | WEIGHT: 161 LBS | BODY MASS INDEX: 21.84 KG/M2 | HEART RATE: 85 BPM | OXYGEN SATURATION: 99 % | TEMPERATURE: 99.1 F

## 2019-07-04 DIAGNOSIS — N39.0 URINARY TRACT INFECTION ASSOCIATED WITH INDWELLING URETHRAL CATHETER, INITIAL ENCOUNTER (H): ICD-10-CM

## 2019-07-04 DIAGNOSIS — Z97.8 COMPLAINT ABOUT URINARY CATHETER: ICD-10-CM

## 2019-07-04 DIAGNOSIS — T83.511A URINARY TRACT INFECTION ASSOCIATED WITH INDWELLING URETHRAL CATHETER, INITIAL ENCOUNTER (H): ICD-10-CM

## 2019-07-04 DIAGNOSIS — R39.9 COMPLAINT ABOUT URINARY CATHETER: ICD-10-CM

## 2019-07-04 DIAGNOSIS — N13.9 URINARY OBSTRUCTION: ICD-10-CM

## 2019-07-04 LAB
ALBUMIN UR-MCNC: 30 MG/DL
APPEARANCE UR: ABNORMAL
BACTERIA #/AREA URNS HPF: ABNORMAL /HPF
BILIRUB UR QL STRIP: NEGATIVE
COLOR UR AUTO: YELLOW
GLUCOSE UR STRIP-MCNC: NEGATIVE MG/DL
HGB UR QL STRIP: ABNORMAL
KETONES UR STRIP-MCNC: NEGATIVE MG/DL
LEUKOCYTE ESTERASE UR QL STRIP: ABNORMAL
NITRATE UR QL: NEGATIVE
PH UR STRIP: 6 PH (ref 5–7)
RBC #/AREA URNS AUTO: >182 /HPF (ref 0–2)
SOURCE: ABNORMAL
SP GR UR STRIP: 1.01 (ref 1–1.03)
UROBILINOGEN UR STRIP-MCNC: 0 MG/DL (ref 0–2)
WBC #/AREA URNS AUTO: >182 /HPF (ref 0–5)
WBC CLUMPS #/AREA URNS HPF: PRESENT /HPF

## 2019-07-04 PROCEDURE — 87088 URINE BACTERIA CULTURE: CPT | Performed by: FAMILY MEDICINE

## 2019-07-04 PROCEDURE — 99284 EMERGENCY DEPT VISIT MOD MDM: CPT | Mod: 25 | Performed by: FAMILY MEDICINE

## 2019-07-04 PROCEDURE — 87086 URINE CULTURE/COLONY COUNT: CPT | Performed by: FAMILY MEDICINE

## 2019-07-04 PROCEDURE — 76857 US EXAM PELVIC LIMITED: CPT | Mod: 26 | Performed by: FAMILY MEDICINE

## 2019-07-04 PROCEDURE — 81001 URINALYSIS AUTO W/SCOPE: CPT | Performed by: FAMILY MEDICINE

## 2019-07-04 PROCEDURE — 76857 US EXAM PELVIC LIMITED: CPT | Performed by: FAMILY MEDICINE

## 2019-07-04 PROCEDURE — 87186 SC STD MICRODIL/AGAR DIL: CPT | Performed by: FAMILY MEDICINE

## 2019-07-04 PROCEDURE — 99284 EMERGENCY DEPT VISIT MOD MDM: CPT | Mod: Z6 | Performed by: FAMILY MEDICINE

## 2019-07-04 RX ORDER — SULFAMETHOXAZOLE/TRIMETHOPRIM 800-160 MG
1 TABLET ORAL 2 TIMES DAILY
Qty: 14 TABLET | Refills: 0 | Status: SHIPPED | OUTPATIENT
Start: 2019-07-04 | End: 2019-07-07 | Stop reason: ALTCHOICE

## 2019-07-04 RX ORDER — CIPROFLOXACIN 250 MG/1
250 TABLET, FILM COATED ORAL 2 TIMES DAILY
Qty: 14 TABLET | Refills: 0 | Status: SHIPPED | OUTPATIENT
Start: 2019-07-04 | End: 2019-07-04

## 2019-07-04 ASSESSMENT — ENCOUNTER SYMPTOMS
DIFFICULTY URINATING: 1
ABDOMINAL PAIN: 1

## 2019-07-04 NOTE — DISCHARGE INSTRUCTIONS
ICD-10-CM    1. Complaint about urinary catheter R39.9 UA with Microscopic    Z97.8 Urine Culture Aerobic Bacterial   2. Urinary obstruction N13.9     catheter appears to now be functional   3. Urinary tract infection associated with indwelling urethral catheter, initial encounter (H) T83.511A     N39.0     Take septra ds twice daily for 7 days.  prior pseudomonas UTI on 2 prior cultures.  Klebsiella was also sensitive to cipro and septra.  However, with prednisone cipro carries risk.  will treat with Septra.   Await urine culture.

## 2019-07-04 NOTE — ED NOTES
Balloon of cath deflated and cath advanced further. Balloon reinflated with 10cc NS. Appears to be draining well at this time. 250cc urine drained from leg bag

## 2019-07-04 NOTE — ED NOTES
Attempted to leave report with [facility] 3 times. Unable to leave a reports due to no answer.

## 2019-07-04 NOTE — ED PROVIDER NOTES
History     Chief Complaint   Patient presents with     Rule out Urinary Tract Infection     concern for UTI from memory care. chronic martinez. possibly pulled out yesterday per EMS     HPI  Maxime Powell is a 85 year old male who presents with a history of coronary disease, type 2 diabetes, migraine headaches, rheumatoid arthritis, history of CAUTI, history of acute renal injury due to sepsis, atrial fibrillation, prior septic shock, dementia who presents from memory care with concern for possible catheter dysfunction urinary tract infection.  Chronic indwelling Martinez.    Urine is smelled foul.  He has had no fever.  No obvious increased confusion at this time.  He does complain of suprapubic pain.  His catheter has put out minimal output since around 1:00.  May have been jostling of the catheter although this history is unclear.    The patient has dementia and his history is more difficult to obtain but he is able to tell me where he is having pain and reports this at the suprapubic region.    Allergies:  Allergies   Allergen Reactions     Finasteride      Other reaction(s): Gynecomastia  Required tamoxifen treatment     Penicillins      Other reaction(s): Edema, Erythema     Gold Rash     Injectable gold       Problem List:    Patient Active Problem List    Diagnosis Date Noted     Septic shock due to urinary tract infection (H) 03/27/2019     Priority: Medium     Urinary tract infection associated with indwelling urinary catheter (H) 03/27/2019     Priority: Medium     Acute renal injury due to sepsis (H) 03/27/2019     Priority: Medium     Immunosuppressed status (H) 03/27/2019     Priority: Medium     Atrial fibrillation with rapid ventricular response (H) 03/27/2019     Priority: Medium     Septic shock (H) 03/27/2019     Priority: Medium     Coronary atherosclerosis 01/31/2018     Priority: Medium     Cough 01/31/2018     Priority: Medium     Elevated prostate specific antigen (PSA) 01/31/2018      Priority: Medium     Lumbago 01/31/2018     Priority: Medium     Migraine headache 01/31/2018     Priority: Medium     Nocturia 01/31/2018     Priority: Medium     Olecranon bursitis 01/31/2018     Priority: Medium     Other symptoms involving digestive system(787.99) 01/31/2018     Priority: Medium     Pain in joint, shoulder region 01/31/2018     Priority: Medium     Urinary frequency 01/31/2018     Priority: Medium     Aftercare following surgery of the musculoskeletal system 11/04/2014     Priority: Medium     Acute urinary retention 05/07/2014     Priority: Medium     ASCVD (arteriosclerotic cardiovascular disease) 05/07/2014     Priority: Medium     Overview:   S/p RCA stenting 2001       Diabetes mellitus, type 2 (H) 05/07/2014     Priority: Medium     Hyperlipidemia 05/07/2014     Priority: Medium     Hypertension 05/07/2014     Priority: Medium     Rheumatoid arthritis (H) 05/07/2014     Priority: Medium     Osteoarthritis of glenohumeral joint 02/13/2014     Priority: Medium     Prostate cancer (H) 08/27/2013     Priority: Medium     Overview:   S/p radition tx 1987 in [place]           Past Medical History:    Past Medical History:   Diagnosis Date     Atherosclerotic heart disease of native coronary artery without angina pectoris      Essential (primary) hypertension      Hyperlipidemia      Mononeuropathy of left lower extremity      Osteoarthritis      Personal history of malignant neoplasm of prostate      RA (rheumatoid arthritis) (H)      Rheumatoid arthritis (H)      Sjogren-Ashwin syndrome      Strain of muscle, fascia and tendon of long head of biceps, unspecified arm, initial encounter      Type 2 diabetes mellitus without complications (H)        Past Surgical History:    Past Surgical History:   Procedure Laterality Date     APPENDECTOMY OPEN      1960     ARTHROSCOPY SHOULDER ROTATOR CUFF REPAIR      2002,right, Jim Chanel MD     ARTHROTOMY WRIST      1986     COLONOSCOPY      2014      HEART CATH, ANGIOPLASTY      ,right coronary artery     OTHER SURGICAL HISTORY      2014,[id number],SHOULDER REPLACEMENT,Right,reverse     OTHER SURGICAL HISTORY      54750.0,NM BONE SCAN 3 PHASE,13 negative for  metastases     OTHER SURGICAL HISTORY      771693,OTHER,no evidence of metastases     OTHER SURGICAL HISTORY      971872,OTHER     OTHER SURGICAL HISTORY      181518,OTHER,right       Family History:    Family History   Problem Relation Age of Onset     Cancer Father         Cancer,throat cancer     Breast Cancer Sister         Cancer-breast       Social History:  Marital Status:   [5]  Social History     Tobacco Use     Smoking status: Former Smoker     Years: 30.00     Types: Cigarettes     Last attempt to quit: 1980     Years since quittin.5     Smokeless tobacco: Former User     Types: Chew     Quit date: 2014     Tobacco comment: Quit smoking: quit smoking 33 years ago. Chew's daily   Substance Use Topics     Alcohol use: No     Drug use: No     Types: Other     Comment: Drug use: No        Medications:      adalimumab (HUMIRA) 40 MG/0.8ML prefilled syringe kit   Artificial Saliva (BIOTENE MOISTURIZING MOUTH MT)   clotrimazole (LOTRIMIN) 1 % external cream   hydroxychloroquine (PLAQUENIL) 200 MG tablet   lisinopril (PRINIVIL/ZESTRIL) 20 MG tablet   metFORMIN (GLUCOPHAGE) 500 MG tablet   mirtazapine (REMERON) 15 MG tablet   Multiple Vitamins-Minerals (ICAPS AREDS 2) CAPS   predniSONE (DELTASONE) 5 MG tablet   tamsulosin (FLOMAX) 0.4 MG capsule         Review of Systems   Unable to perform ROS: Dementia   Gastrointestinal: Positive for abdominal pain.   Genitourinary: Positive for difficulty urinating.         Physical Exam   BP: (!) 140/110  Heart Rate: 89  Temp: 99.1  F (37.3  C)  Resp: 18  Weight: 73 kg (161 lb)  SpO2: 98 %      Physical Exam   Constitutional: He appears distressed (mild).   Eyes: Conjunctivae are normal.   Neck: Neck supple.   Cardiovascular: An  irregularly irregular rhythm present. Exam reveals no friction rub.   No murmur heard.  Pulmonary/Chest: Effort normal and breath sounds normal. No stridor. No respiratory distress.   Skin: He is not diaphoretic.        The patient appears to be in mild discomfort related to suprapubic region pain.  Palpation of the suprapubic region is tender to palpation of the abdomen is otherwise benign with normal bowel sounds and overall nontender.  No CVA tenderness.  Normal bowel sounds.  No significant distention.  The Murcia catheter output demonstrates a small amount of yellow urine present.  This is relatively clear.  It appears that there is only been approximately 20 to 30 cc out in the last 4 hours    ED Course        Procedures               Critical Care time:  none               Results for orders placed or performed during the hospital encounter of 07/04/19 (from the past 24 hour(s))   POC US ABDOMEN LIMITED    Impression    [facility] Procedure Note      Limited Bedside ED Ultrasound of the Urinary Bladder:    PERFORMED BY: Dr. Joel Williamson  INDICATIONS:  Suprapubic pain  PROBE: Low frequency convex probe  BODY LOCATION:  Abdomen  FINDINGS:  Visualization of the bladder in longitudinal and transverse views demonstrated a distended state.  The bladder dimensions measured: 360 cc total  INTERPRETATION:  Total calculated volume was estimated at 360 cc.  The bladder is abnormally distended.  IMAGE DOCUMENTATION: Images were archived to PACs system.   UA with Microscopic   Result Value Ref Range    Color Urine Yellow     Appearance Urine Cloudy     Glucose Urine Negative NEG^Negative mg/dL    Bilirubin Urine Negative NEG^Negative    Ketones Urine Negative NEG^Negative mg/dL    Specific Gravity Urine 1.011 1.003 - 1.035    Blood Urine Large (A) NEG^Negative    pH Urine 6.0 5.0 - 7.0 pH    Protein Albumin Urine 30 (A) NEG^Negative mg/dL    Urobilinogen mg/dL 0.0 0.0 - 2.0 mg/dL    Nitrite Urine Negative  "NEG^Negative    Leukocyte Esterase Urine Large (A) NEG^Negative    Source Catheterized Urine     WBC Urine >182 (H) 0 - 5 /HPF    RBC Urine >182 (H) 0 - 2 /HPF    WBC Clumps Present (A) NEG^Negative /HPF    Bacteria Urine Few (A) NEG^Negative /HPF       Medications - No data to display    Assessments & Plan (with Medical Decision Making)     MDM\": Maxime Powell is a 85 year old male who presents with suprapubic pain and decreased urine output since earlier today.  Catheter appears to be obstructed.  He is afebrile.  Experiencing mild discomfort in the suprapubic region.  Plan to adjust the catheter, flushed this and it may need replacement.  We will also obtain a urinalysis and urine culture although if urine is abnormal,  this will be difficult to tell if this is symptomatic CAUTI, versus asymptomatic bacteriuria and urinary obstruction is the primary issue.    However the urinalysis is markedly abnormal today with white cells in clumps, numerous white cells and red cells large leukocyte esterase.foul urine.  obstruction.  I therefore recommended that we treat for CAUTI.    Choice of antibiotics is more difficult.  Reviewed his past medical history of urine culture results and he said to prior Pseudomonas infections that were sensitive to Cipro and one Klebsiella that was sensitive to Cipro and Septra.  Unfortunately he is on prednisone and therefore reactions with Cipro.  We will therefore use Septra today but will await urine culture.    I have reviewed the nursing notes.    I have reviewed the findings, diagnosis, plan and need for follow up with the patient.          Medication List      There are no discharge medications for this visit.         Final diagnoses:   Complaint about urinary catheter   Urinary obstruction       7/4/2019   [facility] EMERGENCY DEPARTMENT     Joel Williamson MD  07/04/19 0380    I was called by the after hours nurse line regarding a abnormal urine culture results that has " mixed andrés including 10-50,000 colonies of enterococcus and a catheterized sample.  He does have an indwelling catheter.  He also showed group B strep as well.  He has been seen once since that original visit for hematuria and may be having symptomatic UTI at this point, CAUTI -he is broadly sensitive for both the enterococcus and other organism but has a penicillin allergy.  We discussed not using nitrofurantoin due to his advanced age and renal dysfunction.  Will use cephalexin for 7 days with a recheck with urology and precautions given for return.    Joel Williamson MD  12:18 PM July 7, 2019        Joel Williamson MD  07/07/19 3987

## 2019-07-04 NOTE — ED AVS SNAPSHOT
[facility] Emergency Department  [address] [address]  Phone:  [phone]  Fax:  [phone]                                    Maxime Powell   MRN: [id number]    Department:  [facility] Emergency Department   Date of Visit:  7/4/2019           After Visit Summary Signature Page    I have received my discharge instructions, and my questions have been answered. I have discussed any challenges I see with this plan with the nurse or doctor.    ..........................................................................................................................................  Patient/Patient Representative Signature      ..........................................................................................................................................  Patient Representative Print Name and Relationship to Patient    ..................................................               ................................................  Date                                   Time    ..........................................................................................................................................  Reviewed by Signature/Title    ...................................................              ..............................................  Date                                               Time          22EPIC Rev 08/18

## 2019-07-05 ENCOUNTER — HOSPITAL ENCOUNTER (EMERGENCY)
Facility: CLINIC | Age: 84
Discharge: HOME | End: 2019-07-05
Attending: NURSE PRACTITIONER | Admitting: NURSE PRACTITIONER
Payer: COMMERCIAL

## 2019-07-05 VITALS
SYSTOLIC BLOOD PRESSURE: 92 MMHG | TEMPERATURE: 98.7 F | DIASTOLIC BLOOD PRESSURE: 58 MMHG | RESPIRATION RATE: 16 BRPM | OXYGEN SATURATION: 99 %

## 2019-07-05 DIAGNOSIS — T83.89XA EROSION OF URETHRA DUE TO CATHETERIZATION OF URINARY TRACT, INITIAL ENCOUNTER (H): ICD-10-CM

## 2019-07-05 DIAGNOSIS — N36.8 EROSION OF URETHRA DUE TO CATHETERIZATION OF URINARY TRACT, INITIAL ENCOUNTER (H): ICD-10-CM

## 2019-07-05 DIAGNOSIS — N39.0 UTI (URINARY TRACT INFECTION) WITH PYURIA: ICD-10-CM

## 2019-07-05 DIAGNOSIS — R31.9 HEMATURIA: ICD-10-CM

## 2019-07-05 PROCEDURE — 51798 US URINE CAPACITY MEASURE: CPT | Performed by: NURSE PRACTITIONER

## 2019-07-05 PROCEDURE — 99284 EMERGENCY DEPT VISIT MOD MDM: CPT | Mod: Z6 | Performed by: NURSE PRACTITIONER

## 2019-07-05 PROCEDURE — 99284 EMERGENCY DEPT VISIT MOD MDM: CPT | Performed by: NURSE PRACTITIONER

## 2019-07-05 RX ORDER — MUPIROCIN 20 MG/G
OINTMENT TOPICAL 3 TIMES DAILY
Qty: 15 G | Refills: 0 | Status: ON HOLD | OUTPATIENT
Start: 2019-07-05 | End: 2019-09-22

## 2019-07-05 NOTE — ED PROVIDER NOTES
"  History     Chief Complaint   Patient presents with     Hematuria     has catheter, known UTI seen yesterday, family thinks foreskin \"not right\", \"saturating\" briefs with blood     HPI  Maxime Powell is a 85 year old male with a history of coronary disease, type 2 diabetes, migraine headaches, rheumatoid arthritis, history of CAUTI, history of acute renal injury due to sepsis, atrial fibrillation, prior septic shock, dementia who us accompanied by his daughter for evaluation of bloody drainage from the tip of the penis and possible erosion of the glans from indwelling catheter. Daughter describes saturating blood and clots in his brief. Patient was evaluated here yesterday and diagnosed with CAUTI and started on Septra.      Allergies:  Allergies   Allergen Reactions     Finasteride      Other reaction(s): Gynecomastia  Required tamoxifen treatment     Penicillins      Other reaction(s): Edema, Erythema     Gold Rash     Injectable gold       Problem List:    Patient Active Problem List    Diagnosis Date Noted     Septic shock due to urinary tract infection (H) 03/27/2019     Priority: Medium     Urinary tract infection associated with indwelling urinary catheter (H) 03/27/2019     Priority: Medium     Acute renal injury due to sepsis (H) 03/27/2019     Priority: Medium     Immunosuppressed status (H) 03/27/2019     Priority: Medium     Atrial fibrillation with rapid ventricular response (H) 03/27/2019     Priority: Medium     Septic shock (H) 03/27/2019     Priority: Medium     Coronary atherosclerosis 01/31/2018     Priority: Medium     Cough 01/31/2018     Priority: Medium     Elevated prostate specific antigen (PSA) 01/31/2018     Priority: Medium     Lumbago 01/31/2018     Priority: Medium     Migraine headache 01/31/2018     Priority: Medium     Nocturia 01/31/2018     Priority: Medium     Olecranon bursitis 01/31/2018     Priority: Medium     Other symptoms involving digestive system(787.99) 01/31/2018 "     Priority: Medium     Pain in joint, shoulder region 01/31/2018     Priority: Medium     Urinary frequency 01/31/2018     Priority: Medium     Aftercare following surgery of the musculoskeletal system 11/04/2014     Priority: Medium     Acute urinary retention 05/07/2014     Priority: Medium     ASCVD (arteriosclerotic cardiovascular disease) 05/07/2014     Priority: Medium     Overview:   S/p RCA stenting 2001       Diabetes mellitus, type 2 (H) 05/07/2014     Priority: Medium     Hyperlipidemia 05/07/2014     Priority: Medium     Hypertension 05/07/2014     Priority: Medium     Rheumatoid arthritis (H) 05/07/2014     Priority: Medium     Osteoarthritis of glenohumeral joint 02/13/2014     Priority: Medium     Prostate cancer (H) 08/27/2013     Priority: Medium     Overview:   S/p radition tx 1987 in [place]           Past Medical History:    Past Medical History:   Diagnosis Date     Atherosclerotic heart disease of native coronary artery without angina pectoris      Essential (primary) hypertension      Hyperlipidemia      Mononeuropathy of left lower extremity      Osteoarthritis      Personal history of malignant neoplasm of prostate      RA (rheumatoid arthritis) (H)      Rheumatoid arthritis (H)      Sjogren-Ashwin syndrome      Strain of muscle, fascia and tendon of long head of biceps, unspecified arm, initial encounter      Type 2 diabetes mellitus without complications (H)        Past Surgical History:    Past Surgical History:   Procedure Laterality Date     APPENDECTOMY OPEN      1960     ARTHROSCOPY SHOULDER ROTATOR CUFF REPAIR      2002,right, Jim Chanel MD     ARTHROTOMY WRIST      1986     COLONOSCOPY      2014     HEART CATH, ANGIOPLASTY      2001,right coronary artery     OTHER SURGICAL HISTORY      05/07/2014,[id number],SHOULDER REPLACEMENT,Right,reverse     OTHER SURGICAL HISTORY      05029.0,NM BONE SCAN 3 PHASE,09/06/13 negative for  metastases     OTHER SURGICAL HISTORY       950674,OTHER,no evidence of metastases     OTHER SURGICAL HISTORY      369842,OTHER     OTHER SURGICAL HISTORY      848023,OTHER,right       Family History:    Family History   Problem Relation Age of Onset     Cancer Father         Cancer,throat cancer     Breast Cancer Sister         Cancer-breast       Social History:  Marital Status:   [5]  Social History     Tobacco Use     Smoking status: Former Smoker     Years: 30.00     Types: Cigarettes     Last attempt to quit: 1980     Years since quittin.5     Smokeless tobacco: Former User     Types: Chew     Quit date: 2014     Tobacco comment: Quit smoking: quit smoking 33 years ago. Chew's daily   Substance Use Topics     Alcohol use: No     Drug use: No     Types: Other     Comment: Drug use: No        Medications:      mupirocin (BACTROBAN) 2 % external ointment   adalimumab (HUMIRA) 40 MG/0.8ML prefilled syringe kit   Artificial Saliva (BIOTENE MOISTURIZING MOUTH MT)   clotrimazole (LOTRIMIN) 1 % external cream   hydroxychloroquine (PLAQUENIL) 200 MG tablet   lisinopril (PRINIVIL/ZESTRIL) 20 MG tablet   metFORMIN (GLUCOPHAGE) 500 MG tablet   mirtazapine (REMERON) 15 MG tablet   Multiple Vitamins-Minerals (ICAPS AREDS 2) CAPS   predniSONE (DELTASONE) 5 MG tablet   sulfamethoxazole-trimethoprim (BACTRIM DS/SEPTRA DS) 800-160 MG tablet   tamsulosin (FLOMAX) 0.4 MG capsule         Review of Systems   Unable to perform ROS: Dementia       Physical Exam   BP: 92/58  Heart Rate: 85  Temp: 98.7  F (37.1  C)  Resp: 16  SpO2: 99 %      Physical Exam   Constitutional: He appears well-developed and well-nourished. No distress.   HENT:   Head: Normocephalic and atraumatic.   Eyes: Pupils are equal, round, and reactive to light. No scleral icterus.   Cardiovascular: Normal rate and regular rhythm.   Pulmonary/Chest: Effort normal and breath sounds normal. No respiratory distress.   Abdominal: Soft. Bowel sounds are normal. He exhibits no distension. There  is no tenderness.   Genitourinary:   Genitourinary Comments: The venrtal aspect of the glans/urethral opening appears eroded. Mild edema. No obvious active bleeding, laceration, or blood clots. There is some dried blood on the patient's brief.    Musculoskeletal: He exhibits no edema or tenderness.   Neurological: He is alert.   At baseline, dementia   Skin: Skin is warm. No rash noted. He is not diaphoretic.       ED Course        Procedures         No results found for this or any previous visit (from the past 24 hour(s)).    Medications - No data to display    Assessments & Plan (with Medical Decision Making)   Maxime Powell is a 85 year old male with a history of coronary disease, type 2 diabetes, migraine headaches, rheumatoid arthritis, history of CAUTI, history of acute renal injury due to sepsis, atrial fibrillation, prior septic shock, dementia who us accompanied by his daughter for evaluation of bloody drainage from the tip of the penis and possible erosion of the glans from indwelling catheter. Daughter describes saturating blood and clots in his brief. Patient was evaluated here yesterday and diagnosed with CAUTI and started on Septra.      On exam the venrtal aspect of the glans/urethral opening appears eroded. Mild edema. No obvious active bleeding, laceration, or blood clots. There is some dried blood on the patient's brief. The erosion is not acute appearing. Unclear if the bleeding is actually coming from the glans erosion or possibly the urethra given his current UTI.  Murcia is draining well. No abdominal tenderness. Bladder scanner reveals 75ml.  Discussed with daughter Rx for bactroban ointment to the tip of the penis for a week and follow-up with urology.    I have reviewed the nursing notes.    I have reviewed the findings, diagnosis, plan and need for follow up with the patient.         Medication List      Started    mupirocin 2 % external ointment  Commonly known as:  BACTROBAN  Topical,  3 TIMES DAILY, To tip of penis            Final diagnoses:   UTI (urinary tract infection) with pyuria   Hematuria   Erosion of urethra due to catheterization of urinary tract, initial encounter (H)       7/5/2019   [facility] EMERGENCY DEPARTMENT     Martha Melgoza APRN CNP  07/05/19 [id number]

## 2019-07-05 NOTE — ED NOTES
Pt has bleeding from penis. Was placed 2 days ago and uti diagnosed yesterday. On abx. Daughter noticed back of penis split open down the shaft. Has blood clots coming out of urethra. Pt does not c/o pain. Pt in memory care. Catheter flowing appropriately

## 2019-07-05 NOTE — ED AVS SNAPSHOT
[facility] Emergency Department  [address] [address]  Phone:  [phone]  Fax:  [phone]                                    Maxime Powell   MRN: [id number]    Department:  [facility] Emergency Department   Date of Visit:  7/5/2019           After Visit Summary Signature Page    I have received my discharge instructions, and my questions have been answered. I have discussed any challenges I see with this plan with the nurse or doctor.    ..........................................................................................................................................  Patient/Patient Representative Signature      ..........................................................................................................................................  Patient Representative Print Name and Relationship to Patient    ..................................................               ................................................  Date                                   Time    ..........................................................................................................................................  Reviewed by Signature/Title    ...................................................              ..............................................  Date                                               Time          22EPIC Rev 08/18

## 2019-07-05 NOTE — DISCHARGE INSTRUCTIONS
Bactroban ointment to the tip of penis three times a day.  Follow-up with urology.  Continue to monitor, return for worsening.

## 2019-07-07 ENCOUNTER — TELEPHONE (OUTPATIENT)
Dept: EMERGENCY MEDICINE | Facility: CLINIC | Age: 84
End: 2019-07-07

## 2019-07-07 LAB
BACTERIA SPEC CULT: ABNORMAL
Lab: ABNORMAL
SPECIMEN SOURCE: ABNORMAL

## 2019-07-07 RX ORDER — CEPHALEXIN 500 MG/1
500 CAPSULE ORAL 2 TIMES DAILY
Qty: 14 CAPSULE | Refills: 0 | Status: ON HOLD | OUTPATIENT
Start: 2019-07-07 | End: 2019-09-22

## 2019-07-07 NOTE — TELEPHONE ENCOUNTER
[facility] Emergency Department Lab result notification [Adult-Male]    [place] ED lab result protocol used  Urine culture    Reason for call  Notify of lab results, assess symptoms,  review ED providers recommendations/discharge instructions (if necessary) and advise per ED lab result f/u protocol    Lab Result (including Rx patient on, if applicable)  Final Urine Culture Report on 7/7/19.  Emergency Dept/Urgent Care discharge antibiotic prescribed: Sulfamethoxazole-Trimethoprim (Bactrim DS, Septra DS) 800-160 mg PO tablet,  1 tablet by mouth 2 times daily for 7 days.  Bacteria #1: 10,000 to 50,000 colonies/mL Enterococcus faecalis is [NOT TESTED] to antibiotic  Bacteria #2: 10,000 - 50,000 colonies/mL Beta hemolytic Streptococcus group B is [NOT TESTED] to antibiotic  Recommendations in treatment per  ED Lab result protocol.    Information table from ED Provider visit on 7/5/19  Symptoms reported at ED visit (Chief complaint, HPI)   Maxime Powell is a 85 year old male who presents with a history of coronary disease, type 2 diabetes, migraine headaches, rheumatoid arthritis, history of CAUTI, history of acute renal injury due to sepsis, atrial fibrillation, prior septic shock, dementia who presents from [facility] care with concern for possible catheter dysfunction urinary tract infection.  Chronic indwelling Murcia.     Urine is smelled foul.  He has had no fever.  No obvious increased confusion at this time.  He does complain of suprapubic pain.  His catheter has put out minimal output since around 1:00.  May have been jostling of the catheter although this history is unclear.     The patient has dementia and his history is more difficult to obtain but he is able to tell me where he is having pain and reports this at the suprapubic region.   Significant Medical hx, if applicable (i.e. CKD, diabetes) UTI's, septic shock secondary to UTI, DM   Allergies Allergies   Allergen Reactions     Finasteride  "     Other reaction(s): Gynecomastia  Required tamoxifen treatment     Penicillins      Other reaction(s): Edema, Erythema     Gold Rash     Injectable gold      Weight, if applicable Wt Readings from Last 2 Encounters:   07/04/19 73 kg (161 lb)   05/28/19 78.9 kg (174 lb)      Coumadin/Warfarin [Yes /No] No   Creatinine Level (mg/dl) Creatinine   Date Value Ref Range Status   03/30/2019 1.10 0.66 - 1.25 mg/dL Final      Creatinine clearance (ml/min), if applicable Creatinine clearance cannot be calculated (Patient's most recent lab result is older than the maximum 90 days allowed.)   ED providers Impression and Plan (applicable information) MDM\": Maxime Powell is a 85 year old male who presents with suprapubic pain and decreased urine output since earlier today.  Catheter appears to be obstructed.  He is afebrile.  Experiencing mild discomfort in the suprapubic region.  Plan to adjust the catheter, flushed this and it may need replacement.  We will also obtain a urinalysis and urine culture although if urine is abnormal,  this will be difficult to tell if this is symptomatic CAUTI, versus asymptomatic bacteriuria and urinary obstruction is the primary issue.     However the urinalysis is markedly abnormal today with white cells in clumps, numerous white cells and red cells large leukocyte esterase.foul urine.  obstruction.  I therefore recommended that we treat for CAUTI.    Choice of antibiotics is more difficult.  Reviewed his past medical history of urine culture results and he said to prior Pseudomonas infections that were sensitive to Cipro and one Klebsiella that was sensitive to Cipro and Septra.  Unfortunately he is on prednisone and therefore reactions with Cipro.  We will therefore use Septra today but will await urine culture.   ED diagnosis Complaint about urinary catheter   ED provider Joel Williamson MD      RN Assessment (Patient s current Symptoms), include time called.  [Insert Left message " here if message left]  Still kind of weak and out of it.  Son reports he will see urology as per recommendation.  Urine flowing well, no further blood.  Was never able to speak with AL staff, tried multiple times.  Son will  script and bring to facility.    RN Recommendations/Instructions per [facility] ED lab result protocol  Patient notified of lab result and treatment recommendations.  Rx for Keflex sent to [Pharmacy - [facility]].  RN reviewed information about stopping Bactrim one Keflex obtained.       [facility] Emergency Department Provider Name & Recommendations (included time consulted)  Did review with Dr. Williamson in [facility] at 12:10 pm.   Keflex 500 mg PO TID x 7 days ordered to pharmacy per Dr Williamson' recommendation.  Patient should f/u with urology.      Please Contact your PCP clinic or return to the Emergency department if your:    Symptoms return.    Symptoms do not resolve after completing antibiotic.    Symptoms worsen or other concerning symptom's.    PCP follow-up Questions asked: YES       Mercedes Grider RN    [facility] RN  Lung Nodule and ED Lab Results F/U RN  Epic pool (ED late result f/u RN) : [id number]   # [phone]    Copy of Lab result  Order   Urine Culture Aerobic Bacterial [DAY921] (Order [id number])   Exam Information     Exam Date Exam Time Accession # Results    7/4/19  4:07 PM [id number]    Component Results     Specimen Information: Catheterized Urine        Component Collected Lab   Specimen Description 07/04/2019  4:07    Catheterized Urine    Special Requests 07/04/2019  4:07    Specimen received in preservative    Culture Micro Abnormal  07/04/2019  4:07    10,000 to 50,000 colonies/mL   Enterococcus faecalis    Culture Micro Abnormal  07/04/2019  4:07    10,000 to 50,000 colonies/mL   Streptococcus agalactiae sero group B   Susceptibility testing not routinely done on this organism from the genitourinary tract.   Our antibiogram  indicates that Group B streptococci are susceptible to ampicillin,   penicillin, vancomycin and the cephalosporins. Susceptibility testing must be requested   within 5 days.   Group B Streptococcus may be significant in OB patients, however, it is part of the normal    urogenital andrés.    Culture Micro Abnormal  07/04/2019  4:07    <10,000 colonies/mL   Staphylococcus aureus    Susceptibility     Enterococcus faecalis     Antibiotic Interpretation Sensitivity Method Status   AMPICILLIN Sensitive <=2 ug/mL KYLEE Final   NITROFURANTOIN Sensitive <=16 ug/mL KYLEE Final   PENICILLIN Sensitive 4 ug/mL KYLEE Final   VANCOMYCIN Sensitive 1 ug/mL KYLEE Final   Staphylococcus aureus     Antibiotic Interpretation Sensitivity Method Status   GENTAMICIN Sensitive <=0.5 ug/mL KYLEE Final   NITROFURANTOIN Sensitive <=16 ug/mL KYLEE Final   OXACILLIN Sensitive 0.5 ug/mL KYLEE Final   TETRACYCLINE Sensitive <=1 ug/mL KYLEE Final   Trimethoprim/Sulfa Sensitive <=0.5/9.5 ug/mL KYLEE Final   VANCOMYCIN Sensitive 1 ug/mL KYLEE Final

## 2019-07-31 ENCOUNTER — RECORDS - HEALTHEAST (OUTPATIENT)
Dept: LAB | Facility: CLINIC | Age: 84
End: 2019-07-31

## 2019-07-31 LAB

## 2019-08-01 LAB — BACTERIA SPEC CULT: NORMAL

## 2019-08-15 ENCOUNTER — RECORDS - HEALTHEAST (OUTPATIENT)
Dept: LAB | Facility: CLINIC | Age: 84
End: 2019-08-15

## 2019-08-15 LAB
ALBUMIN SERPL-MCNC: 2.8 G/DL (ref 3.5–5)
ALP SERPL-CCNC: 64 U/L (ref 45–120)
ALT SERPL W P-5'-P-CCNC: <9 U/L (ref 0–45)
AST SERPL W P-5'-P-CCNC: 14 U/L (ref 0–40)
BASOPHILS # BLD AUTO: 0 THOU/UL (ref 0–0.2)
BASOPHILS NFR BLD AUTO: 0 % (ref 0–2)
BILIRUB SERPL-MCNC: 0.3 MG/DL (ref 0–1)
BUN SERPL-MCNC: 20 MG/DL (ref 8–28)
C REACTIVE PROTEIN LHE: 4.2 MG/DL (ref 0–0.8)
CREAT SERPL-MCNC: 0.98 MG/DL (ref 0.7–1.3)
EOSINOPHIL # BLD AUTO: 0.1 THOU/UL (ref 0–0.4)
EOSINOPHIL NFR BLD AUTO: 1 % (ref 0–6)
ERYTHROCYTE [DISTWIDTH] IN BLOOD BY AUTOMATED COUNT: 14.4 % (ref 11–14.5)
ERYTHROCYTE [SEDIMENTATION RATE] IN BLOOD BY WESTERGREN METHOD: 78 MM/HR (ref 0–15)
GFR SERPL CREATININE-BSD FRML MDRD: >60 ML/MIN/1.73M2
HCT VFR BLD AUTO: 34.8 % (ref 40–54)
HGB BLD-MCNC: 10.7 G/DL (ref 14–18)
LYMPHOCYTES # BLD AUTO: 1 THOU/UL (ref 0.8–4.4)
LYMPHOCYTES NFR BLD AUTO: 11 % (ref 20–40)
MCH RBC QN AUTO: 28.1 PG (ref 27–34)
MCHC RBC AUTO-ENTMCNC: 30.7 G/DL (ref 32–36)
MCV RBC AUTO: 91 FL (ref 80–100)
MONOCYTES # BLD AUTO: 0.5 THOU/UL (ref 0–0.9)
MONOCYTES NFR BLD AUTO: 6 % (ref 2–10)
NEUTROPHILS # BLD AUTO: 7.4 THOU/UL (ref 2–7.7)
NEUTROPHILS NFR BLD AUTO: 82 % (ref 50–70)
PLATELET # BLD AUTO: 218 THOU/UL (ref 140–440)
PMV BLD AUTO: 9.7 FL (ref 8.5–12.5)
RBC # BLD AUTO: 3.81 MILL/UL (ref 4.4–6.2)
WBC: 9.1 THOU/UL (ref 4–11)

## 2019-09-04 ENCOUNTER — RECORDS - HEALTHEAST (OUTPATIENT)
Dept: LAB | Facility: CLINIC | Age: 84
End: 2019-09-04

## 2019-09-04 LAB
ALBUMIN UR-MCNC: ABNORMAL MG/DL
APPEARANCE UR: ABNORMAL
BACTERIA #/AREA URNS HPF: ABNORMAL HPF
BILIRUB UR QL STRIP: NEGATIVE
COLOR UR AUTO: YELLOW
GLUCOSE UR STRIP-MCNC: ABNORMAL MG/DL
HGB UR QL STRIP: ABNORMAL
KETONES UR STRIP-MCNC: NEGATIVE MG/DL
LEUKOCYTE ESTERASE UR QL STRIP: ABNORMAL
NITRATE UR QL: POSITIVE
PH UR STRIP: 6.5 [PH] (ref 4.5–8)
RBC #/AREA URNS AUTO: ABNORMAL HPF
SP GR UR STRIP: 1.02 (ref 1–1.03)
SQUAMOUS #/AREA URNS AUTO: ABNORMAL LPF
UROBILINOGEN UR STRIP-ACNC: ABNORMAL
WBC #/AREA URNS AUTO: >100 HPF
WBC CLUMPS #/AREA URNS HPF: PRESENT /[HPF]

## 2019-09-06 ENCOUNTER — MEDICAL CORRESPONDENCE (OUTPATIENT)
Dept: HEALTH INFORMATION MANAGEMENT | Facility: CLINIC | Age: 84
End: 2019-09-06

## 2019-09-06 LAB
BACTERIA SPEC CULT: ABNORMAL
BACTERIA SPEC CULT: ABNORMAL

## 2019-09-14 ENCOUNTER — HOSPITAL ENCOUNTER (EMERGENCY)
Facility: CLINIC | Age: 84
Discharge: HOME | End: 2019-09-14
Attending: STUDENT IN AN ORGANIZED HEALTH CARE EDUCATION/TRAINING PROGRAM | Admitting: STUDENT IN AN ORGANIZED HEALTH CARE EDUCATION/TRAINING PROGRAM
Payer: COMMERCIAL

## 2019-09-14 VITALS
TEMPERATURE: 97.9 F | RESPIRATION RATE: 18 BRPM | OXYGEN SATURATION: 98 % | DIASTOLIC BLOOD PRESSURE: 71 MMHG | SYSTOLIC BLOOD PRESSURE: 106 MMHG

## 2019-09-14 DIAGNOSIS — Z46.6 URINARY CATHETER (FOLEY) CHANGE REQUIRED: ICD-10-CM

## 2019-09-14 PROCEDURE — 51702 INSERT TEMP BLADDER CATH: CPT | Performed by: STUDENT IN AN ORGANIZED HEALTH CARE EDUCATION/TRAINING PROGRAM

## 2019-09-14 PROCEDURE — 99283 EMERGENCY DEPT VISIT LOW MDM: CPT | Performed by: STUDENT IN AN ORGANIZED HEALTH CARE EDUCATION/TRAINING PROGRAM

## 2019-09-14 PROCEDURE — 99284 EMERGENCY DEPT VISIT MOD MDM: CPT | Mod: Z6 | Performed by: STUDENT IN AN ORGANIZED HEALTH CARE EDUCATION/TRAINING PROGRAM

## 2019-09-14 NOTE — ED AVS SNAPSHOT
[facility] Emergency Department  [address] [address]  Phone:  [phone]  Fax:  [phone]                                    Maxime Powell   MRN: [id number]    Department:  [facility] Emergency Department   Date of Visit:  9/14/2019           After Visit Summary Signature Page    I have received my discharge instructions, and my questions have been answered. I have discussed any challenges I see with this plan with the nurse or doctor.    ..........................................................................................................................................  Patient/Patient Representative Signature      ..........................................................................................................................................  Patient Representative Print Name and Relationship to Patient    ..................................................               ................................................  Date                                   Time    ..........................................................................................................................................  Reviewed by Signature/Title    ...................................................              ..............................................  Date                                               Time          22EPIC Rev 08/18

## 2019-09-14 NOTE — ED NOTES
Pt in Memory Care unit pulled his  Murcia catheter out. This is the third time. He is awake and cooperative although  Cconfused. EMS states that the balloon was inflated at time of removal. The pt has blood clot on the end of his penis.

## 2019-09-14 NOTE — ED PROVIDER NOTES
History     Chief Complaint   Patient presents with     Catheter Problem     Pulled out by the pt     HPI  Maxime Powell is a 85 year old male with past medical history which includes dementia, rheumatoid arthritis, and atrial fibrillation who presents by EMS from [facility] for evaluation after he removed his Murcia catheter.  By report, the patient had pulled out his Murcia catheter to previous times today, however overnight he removed it with the inflated bulb resulting in some bleeding.  He is alert in the department but not oriented to place or time.  He has some minimal bleeding from the urethra but denies pain.  He is hemodynamically stable, afebrile, and without complaint.    Allergies:  Allergies   Allergen Reactions     Finasteride      Other reaction(s): Gynecomastia  Required tamoxifen treatment     Penicillins      Other reaction(s): Edema, Erythema     Gold Rash     Injectable gold       Problem List:    Patient Active Problem List    Diagnosis Date Noted     Septic shock due to urinary tract infection (H) 03/27/2019     Priority: Medium     Urinary tract infection associated with indwelling urinary catheter (H) 03/27/2019     Priority: Medium     Acute renal injury due to sepsis (H) 03/27/2019     Priority: Medium     Immunosuppressed status (H) 03/27/2019     Priority: Medium     Atrial fibrillation with rapid ventricular response (H) 03/27/2019     Priority: Medium     Septic shock (H) 03/27/2019     Priority: Medium     Coronary atherosclerosis 01/31/2018     Priority: Medium     Cough 01/31/2018     Priority: Medium     Elevated prostate specific antigen (PSA) 01/31/2018     Priority: Medium     Lumbago 01/31/2018     Priority: Medium     Migraine headache 01/31/2018     Priority: Medium     Nocturia 01/31/2018     Priority: Medium     Olecranon bursitis 01/31/2018     Priority: Medium     Other symptoms involving digestive system(787.99) 01/31/2018     Priority: Medium     Pain in joint, shoulder  region 01/31/2018     Priority: Medium     Urinary frequency 01/31/2018     Priority: Medium     Aftercare following surgery of the musculoskeletal system 11/04/2014     Priority: Medium     Acute urinary retention 05/07/2014     Priority: Medium     ASCVD (arteriosclerotic cardiovascular disease) 05/07/2014     Priority: Medium     Overview:   S/p RCA stenting 2001       Diabetes mellitus, type 2 (H) 05/07/2014     Priority: Medium     Hyperlipidemia 05/07/2014     Priority: Medium     Hypertension 05/07/2014     Priority: Medium     Rheumatoid arthritis (H) 05/07/2014     Priority: Medium     Osteoarthritis of glenohumeral joint 02/13/2014     Priority: Medium     Prostate cancer (H) 08/27/2013     Priority: Medium     Overview:   S/p radition tx 1987 in [place]           Past Medical History:    Past Medical History:   Diagnosis Date     Atherosclerotic heart disease of native coronary artery without angina pectoris      Essential (primary) hypertension      Hyperlipidemia      Mononeuropathy of left lower extremity      Osteoarthritis      Personal history of malignant neoplasm of prostate      RA (rheumatoid arthritis) (H)      Rheumatoid arthritis (H)      Sjogren-Ashwin syndrome      Strain of muscle, fascia and tendon of long head of biceps, unspecified arm, initial encounter      Type 2 diabetes mellitus without complications (H)        Past Surgical History:    Past Surgical History:   Procedure Laterality Date     APPENDECTOMY OPEN      1960     ARTHROSCOPY SHOULDER ROTATOR CUFF REPAIR      2002,right, Jim Chanel MD     ARTHROTOMY WRIST      1986     COLONOSCOPY      2014     HEART CATH, ANGIOPLASTY      2001,right coronary artery     OTHER SURGICAL HISTORY      05/07/2014,[id number],SHOULDER REPLACEMENT,Right,reverse     OTHER SURGICAL HISTORY      21809.0,NM BONE SCAN 3 PHASE,09/06/13 negative for  metastases     OTHER SURGICAL HISTORY      241388,OTHER,no evidence of metastases     OTHER SURGICAL  HISTORY      123556,OTHER     OTHER SURGICAL HISTORY      [id number],OTHER,right       Family History:    Family History   Problem Relation Age of Onset     Cancer Father         Cancer,throat cancer     Breast Cancer Sister         Cancer-breast       Social History:  Marital Status:   [5]  Social History     Tobacco Use     Smoking status: Former Smoker     Years: 30.00     Types: Cigarettes     Last attempt to quit: 1980     Years since quittin.7     Smokeless tobacco: Former User     Types: Chew     Quit date: 2014     Tobacco comment: Quit smoking: quit smoking 33 years ago. Chew's daily   Substance Use Topics     Alcohol use: No     Drug use: No     Types: Other     Comment: Drug use: No        Medications:      adalimumab (HUMIRA) 40 MG/0.8ML prefilled syringe kit   Artificial Saliva (BIOTENE MOISTURIZING MOUTH MT)   clotrimazole (LOTRIMIN) 1 % external cream   hydroxychloroquine (PLAQUENIL) 200 MG tablet   lisinopril (PRINIVIL/ZESTRIL) 20 MG tablet   metFORMIN (GLUCOPHAGE) 500 MG tablet   mirtazapine (REMERON) 15 MG tablet   Multiple Vitamins-Minerals (ICAPS AREDS 2) CAPS   mupirocin (BACTROBAN) 2 % external ointment   predniSONE (DELTASONE) 5 MG tablet   tamsulosin (FLOMAX) 0.4 MG capsule         Review of Systems unable to obtain from patient secondary to clinical condition...      Physical Exam   BP: 106/71  Heart Rate: 78  Temp: 97.9  F (36.6  C)  Resp: 18  SpO2: 98 %      Physical Exam  Constitutional:  Well developed, well nourished.  Appears nontoxic and in no acute distress.   HENT:  Normocephalic and atraumatic.  Symmetric in appearance.  Eyes:  Conjunctivae are normal.  Neck:  Neck supple.  Cardiovascular:  No cyanosis.  Respiratory:  Effort normal without sign of respiratory distress.    Gastrointestinal:  Soft nondistended abdomen.  Nontender and without guarding.  No rigidity or rebound tenderness.  Negative Lackey's sign.  Negative McBurney's point.    Genitourinary:  Minimal  bleeding from the inferior aspect of the urethra, otherwise penis is without lesions, ulcerations, or discharge.    Musculoskeletal:  Moves extremities spontaneously.  Neurological:  Patient is alert.  Skin:  Skin is warm and dry.  Psychiatric:  Normal mood and affect.      ED Course        Procedures               Critical Care time:  none               No results found for this or any previous visit (from the past 24 hour(s)).    Medications - No data to display    Assessments & Plan (with Medical Decision Making)   Maxime Powell is a 85 year old male who presents to the department by EMS for evaluation and replacement of Murcia.  Patient had pulled out his Murcia catheter 3 times today, he has some normal bleeding in the department but Murcia catheter was replaced by nursing staff without difficulty or suspicion for false passage.  Good urine return afterwards with minimal bleeding.  He will be returned to the SNF with recommendation to monitor patient closely as to avoid further injury.          Disclaimer:  This note consists of symbols derived from keyboarding, dictation, and/or voice recognition software.  As a result, there may be errors in the script that have gone undetected.  Please consider this when interpreting information found in the chart.        I have reviewed the nursing notes.    I have reviewed the findings, diagnosis, plan and need for follow up with the patient.       New Prescriptions    No medications on file       Final diagnoses:   Urinary catheter (Murcia) change required       9/14/2019   [facility] EMERGENCY DEPARTMENT     Leonel Vogt DO  09/14/19 0251

## 2019-09-17 ENCOUNTER — RECORDS - HEALTHEAST (OUTPATIENT)
Dept: LAB | Facility: CLINIC | Age: 84
End: 2019-09-17

## 2019-09-17 LAB
ANION GAP SERPL CALCULATED.3IONS-SCNC: 11 MMOL/L (ref 5–18)
BUN SERPL-MCNC: 13 MG/DL (ref 8–28)
CALCIUM SERPL-MCNC: 9.4 MG/DL (ref 8.5–10.5)
CHLORIDE BLD-SCNC: 100 MMOL/L (ref 98–107)
CO2 SERPL-SCNC: 25 MMOL/L (ref 22–31)
CREAT SERPL-MCNC: 1.05 MG/DL (ref 0.7–1.3)
GFR SERPL CREATININE-BSD FRML MDRD: >60 ML/MIN/1.73M2
GLUCOSE BLD-MCNC: 274 MG/DL (ref 70–125)
POTASSIUM BLD-SCNC: 3.9 MMOL/L (ref 3.5–5)
SODIUM SERPL-SCNC: 136 MMOL/L (ref 136–145)

## 2019-09-21 ENCOUNTER — HOSPITAL ENCOUNTER (INPATIENT)
Facility: CLINIC | Age: 84
LOS: 3 days | Discharge: HOME HEALTH | DRG: 700 | End: 2019-09-24
Attending: FAMILY MEDICINE | Admitting: INTERNAL MEDICINE
Payer: COMMERCIAL

## 2019-09-21 DIAGNOSIS — E11.8 TYPE 2 DIABETES MELLITUS WITH COMPLICATION, UNSPECIFIED WHETHER LONG TERM INSULIN USE: ICD-10-CM

## 2019-09-21 DIAGNOSIS — T83.511A URINARY TRACT INFECTION ASSOCIATED WITH CATHETERIZATION OF URINARY TRACT, UNSPECIFIED INDWELLING URINARY CATHETER TYPE, INITIAL ENCOUNTER (H): Primary | ICD-10-CM

## 2019-09-21 DIAGNOSIS — N39.0 UTI (URINARY TRACT INFECTION), BACTERIAL: ICD-10-CM

## 2019-09-21 DIAGNOSIS — N39.0 URINARY TRACT INFECTION ASSOCIATED WITH CATHETERIZATION OF URINARY TRACT, UNSPECIFIED INDWELLING URINARY CATHETER TYPE, INITIAL ENCOUNTER (H): Primary | ICD-10-CM

## 2019-09-21 DIAGNOSIS — R74.02 ELEVATED SERUM LACTATE DEHYDROGENASE: ICD-10-CM

## 2019-09-21 DIAGNOSIS — A49.9 UTI (URINARY TRACT INFECTION), BACTERIAL: ICD-10-CM

## 2019-09-21 LAB
ALBUMIN SERPL-MCNC: 2.4 G/DL (ref 3.4–5)
ALBUMIN UR-MCNC: NEGATIVE MG/DL
ALP SERPL-CCNC: 64 U/L (ref 40–150)
ALT SERPL W P-5'-P-CCNC: 17 U/L (ref 0–70)
ANION GAP SERPL CALCULATED.3IONS-SCNC: 9 MMOL/L (ref 3–14)
APPEARANCE UR: ABNORMAL
AST SERPL W P-5'-P-CCNC: 12 U/L (ref 0–45)
BACTERIA #/AREA URNS HPF: ABNORMAL /HPF
BASOPHILS # BLD AUTO: 0 10E9/L (ref 0–0.2)
BASOPHILS NFR BLD AUTO: 0.3 %
BILIRUB SERPL-MCNC: 0.4 MG/DL (ref 0.2–1.3)
BILIRUB UR QL STRIP: NEGATIVE
BUN SERPL-MCNC: 17 MG/DL (ref 7–30)
CALCIUM SERPL-MCNC: 8.4 MG/DL (ref 8.5–10.1)
CAOX CRY #/AREA URNS HPF: ABNORMAL /HPF
CHLORIDE SERPL-SCNC: 102 MMOL/L (ref 94–109)
CO2 SERPL-SCNC: 23 MMOL/L (ref 20–32)
COLOR UR AUTO: YELLOW
CREAT SERPL-MCNC: 0.9 MG/DL (ref 0.66–1.25)
CRP SERPL-MCNC: 53.5 MG/L (ref 0–8)
DIFFERENTIAL METHOD BLD: ABNORMAL
EOSINOPHIL # BLD AUTO: 0.1 10E9/L (ref 0–0.7)
EOSINOPHIL NFR BLD AUTO: 0.5 %
ERYTHROCYTE [DISTWIDTH] IN BLOOD BY AUTOMATED COUNT: 15.9 % (ref 10–15)
GFR SERPL CREATININE-BSD FRML MDRD: 77 ML/MIN/{1.73_M2}
GLUCOSE SERPL-MCNC: 318 MG/DL (ref 70–99)
GLUCOSE UR STRIP-MCNC: 150 MG/DL
HCT VFR BLD AUTO: 30 % (ref 40–53)
HGB BLD-MCNC: 9.7 G/DL (ref 13.3–17.7)
HGB UR QL STRIP: ABNORMAL
IMM GRANULOCYTES # BLD: 0 10E9/L (ref 0–0.4)
IMM GRANULOCYTES NFR BLD: 0.4 %
KETONES UR STRIP-MCNC: NEGATIVE MG/DL
LACTATE BLD-SCNC: 2.1 MMOL/L (ref 0.7–2)
LACTATE BLD-SCNC: 2.8 MMOL/L (ref 0.7–2)
LEUKOCYTE ESTERASE UR QL STRIP: ABNORMAL
LIPASE SERPL-CCNC: 280 U/L (ref 73–393)
LYMPHOCYTES # BLD AUTO: 0.5 10E9/L (ref 0.8–5.3)
LYMPHOCYTES NFR BLD AUTO: 4.9 %
MCH RBC QN AUTO: 29 PG (ref 26.5–33)
MCHC RBC AUTO-ENTMCNC: 32.3 G/DL (ref 31.5–36.5)
MCV RBC AUTO: 90 FL (ref 78–100)
MONOCYTES # BLD AUTO: 0.3 10E9/L (ref 0–1.3)
MONOCYTES NFR BLD AUTO: 2.5 %
NEUTROPHILS # BLD AUTO: 10.1 10E9/L (ref 1.6–8.3)
NEUTROPHILS NFR BLD AUTO: 91.4 %
NITRATE UR QL: NEGATIVE
NRBC # BLD AUTO: 0 10*3/UL
NRBC BLD AUTO-RTO: 0 /100
PH UR STRIP: 7 PH (ref 5–7)
PLATELET # BLD AUTO: 295 10E9/L (ref 150–450)
POTASSIUM SERPL-SCNC: 4 MMOL/L (ref 3.4–5.3)
PROT SERPL-MCNC: 6.8 G/DL (ref 6.8–8.8)
RBC # BLD AUTO: 3.34 10E12/L (ref 4.4–5.9)
RBC #/AREA URNS AUTO: >182 /HPF (ref 0–2)
SODIUM SERPL-SCNC: 134 MMOL/L (ref 133–144)
SOURCE: ABNORMAL
SP GR UR STRIP: 1.01 (ref 1–1.03)
UROBILINOGEN UR STRIP-MCNC: 0 MG/DL (ref 0–2)
WBC # BLD AUTO: 11 10E9/L (ref 4–11)
WBC #/AREA URNS AUTO: 65 /HPF (ref 0–5)
YEAST #/AREA URNS HPF: ABNORMAL /HPF

## 2019-09-21 PROCEDURE — 12000000 ZZH R&B MED SURG/OB

## 2019-09-21 PROCEDURE — 85025 COMPLETE CBC W/AUTO DIFF WBC: CPT | Performed by: FAMILY MEDICINE

## 2019-09-21 PROCEDURE — 87086 URINE CULTURE/COLONY COUNT: CPT | Performed by: FAMILY MEDICINE

## 2019-09-21 PROCEDURE — 25800030 ZZH RX IP 258 OP 636: Performed by: FAMILY MEDICINE

## 2019-09-21 PROCEDURE — 87106 FUNGI IDENTIFICATION YEAST: CPT | Performed by: FAMILY MEDICINE

## 2019-09-21 PROCEDURE — 83690 ASSAY OF LIPASE: CPT | Performed by: FAMILY MEDICINE

## 2019-09-21 PROCEDURE — 96366 THER/PROPH/DIAG IV INF ADDON: CPT | Performed by: FAMILY MEDICINE

## 2019-09-21 PROCEDURE — 96361 HYDRATE IV INFUSION ADD-ON: CPT | Performed by: FAMILY MEDICINE

## 2019-09-21 PROCEDURE — 80053 COMPREHEN METABOLIC PANEL: CPT | Performed by: FAMILY MEDICINE

## 2019-09-21 PROCEDURE — 96367 TX/PROPH/DG ADDL SEQ IV INF: CPT | Performed by: FAMILY MEDICINE

## 2019-09-21 PROCEDURE — 83605 ASSAY OF LACTIC ACID: CPT | Performed by: FAMILY MEDICINE

## 2019-09-21 PROCEDURE — 96365 THER/PROPH/DIAG IV INF INIT: CPT | Performed by: FAMILY MEDICINE

## 2019-09-21 PROCEDURE — 99285 EMERGENCY DEPT VISIT HI MDM: CPT | Mod: 25 | Performed by: FAMILY MEDICINE

## 2019-09-21 PROCEDURE — 86140 C-REACTIVE PROTEIN: CPT | Performed by: FAMILY MEDICINE

## 2019-09-21 PROCEDURE — 81001 URINALYSIS AUTO W/SCOPE: CPT | Performed by: FAMILY MEDICINE

## 2019-09-21 PROCEDURE — 25000128 H RX IP 250 OP 636: Performed by: FAMILY MEDICINE

## 2019-09-21 RX ORDER — ONDANSETRON 2 MG/ML
4 INJECTION INTRAMUSCULAR; INTRAVENOUS EVERY 6 HOURS PRN
Status: DISCONTINUED | OUTPATIENT
Start: 2019-09-21 | End: 2019-09-24 | Stop reason: HOSPADM

## 2019-09-21 RX ORDER — CIPROFLOXACIN 2 MG/ML
400 INJECTION, SOLUTION INTRAVENOUS ONCE
Status: COMPLETED | OUTPATIENT
Start: 2019-09-21 | End: 2019-09-21

## 2019-09-21 RX ORDER — ONDANSETRON 4 MG/1
4 TABLET, ORALLY DISINTEGRATING ORAL EVERY 6 HOURS PRN
Status: DISCONTINUED | OUTPATIENT
Start: 2019-09-21 | End: 2019-09-24 | Stop reason: HOSPADM

## 2019-09-21 RX ORDER — CEFTRIAXONE SODIUM 1 G/50ML
1 INJECTION, SOLUTION INTRAVENOUS ONCE
Status: DISCONTINUED | OUTPATIENT
Start: 2019-09-21 | End: 2019-09-21

## 2019-09-21 RX ORDER — SODIUM CHLORIDE 9 MG/ML
INJECTION, SOLUTION INTRAVENOUS CONTINUOUS
Status: DISCONTINUED | OUTPATIENT
Start: 2019-09-22 | End: 2019-09-22

## 2019-09-21 RX ORDER — SODIUM CHLORIDE, SODIUM LACTATE, POTASSIUM CHLORIDE, CALCIUM CHLORIDE 600; 310; 30; 20 MG/100ML; MG/100ML; MG/100ML; MG/100ML
1000 INJECTION, SOLUTION INTRAVENOUS CONTINUOUS
Status: DISCONTINUED | OUTPATIENT
Start: 2019-09-21 | End: 2019-09-24 | Stop reason: HOSPADM

## 2019-09-21 RX ORDER — SODIUM CHLORIDE, SODIUM LACTATE, POTASSIUM CHLORIDE, CALCIUM CHLORIDE 600; 310; 30; 20 MG/100ML; MG/100ML; MG/100ML; MG/100ML
1000 INJECTION, SOLUTION INTRAVENOUS CONTINUOUS
Status: DISCONTINUED | OUTPATIENT
Start: 2019-09-21 | End: 2019-09-22

## 2019-09-21 RX ADMIN — SODIUM CHLORIDE, POTASSIUM CHLORIDE, SODIUM LACTATE AND CALCIUM CHLORIDE 1000 ML: 600; 310; 30; 20 INJECTION, SOLUTION INTRAVENOUS at 21:28

## 2019-09-21 RX ADMIN — TAZOBACTAM SODIUM AND PIPERACILLIN SODIUM 4.5 G: 500; 4 INJECTION, SOLUTION INTRAVENOUS at 22:30

## 2019-09-21 RX ADMIN — SODIUM CHLORIDE, POTASSIUM CHLORIDE, SODIUM LACTATE AND CALCIUM CHLORIDE 1000 ML: 600; 310; 30; 20 INJECTION, SOLUTION INTRAVENOUS at 19:31

## 2019-09-21 RX ADMIN — VANCOMYCIN HYDROCHLORIDE 1500 MG: 10 INJECTION, POWDER, LYOPHILIZED, FOR SOLUTION INTRAVENOUS at 23:02

## 2019-09-21 RX ADMIN — SODIUM CHLORIDE, POTASSIUM CHLORIDE, SODIUM LACTATE AND CALCIUM CHLORIDE 1000 ML: 600; 310; 30; 20 INJECTION, SOLUTION INTRAVENOUS at 23:02

## 2019-09-21 RX ADMIN — SODIUM CHLORIDE, POTASSIUM CHLORIDE, SODIUM LACTATE AND CALCIUM CHLORIDE 1000 ML: 600; 310; 30; 20 INJECTION, SOLUTION INTRAVENOUS at 17:41

## 2019-09-21 RX ADMIN — CIPROFLOXACIN 400 MG: 2 INJECTION, SOLUTION INTRAVENOUS at 19:35

## 2019-09-21 NOTE — ED PROVIDER NOTES
History     Chief Complaint   Patient presents with     Fever     HPI  Maxime Powell is a 85 year old male, past medical history is significant for septic shock due to UTI, atrial fibrillation with RVR, ASCVD, chronic low back pain, migraine headache, type 2 diabetes, hyperlipidemia, hypertension, rheumatoid arthritis, prostate cancer, presents to the emergency department with concerns of fever.  the patient is brought in from his care home by his daughter who visits with him every day.  She is concerned that he is more confused than his usual confused baseline today as well as having a fever of 101.7 at the care home by her description earlier today.  She is especially concerned about this because he has had sepsis due to a UTI fairly recently with hospital admission 9/5/2019 and discharged 9/10/2019 which is reviewed at the time of his presentation.  Patient has an indwelling Murcia catheter for urinary retention.  She also notes he had multiple issues with proper function of the catheter as well.  She does not want him to get septic again which I think is quite understandable.  I was able to review the hospitalist note discussing Pseudomonas urosepsis and the initial selection of antibiotics which included Zosyn and vancomycin which the patient tolerated well and improved on (he has a listed penicillin allergy), he was subsequently transitioned to Cipro after discharge orally.  He has had no URI type symptoms such as cough runny nose congestion or sore throat.  Has been eating and drinking reasonably well for him and has been ambulating his normal amount up until today.  The patient self supplies a little history but notes that he has some stomach upset like nausea but no pain.  He denied any chest pain or back pain.  No headache or vision changes acutely.      Allergies:  Allergies   Allergen Reactions     Finasteride      Other reaction(s): Gynecomastia  Required tamoxifen treatment     Penicillins       Other reaction(s): Edema, Erythema     Gold Rash     Injectable gold       Problem List:    Patient Active Problem List    Diagnosis Date Noted     Septic shock due to urinary tract infection (H) 03/27/2019     Priority: Medium     Urinary tract infection associated with indwelling urinary catheter (H) 03/27/2019     Priority: Medium     Acute renal injury due to sepsis (H) 03/27/2019     Priority: Medium     Immunosuppressed status (H) 03/27/2019     Priority: Medium     Atrial fibrillation with rapid ventricular response (H) 03/27/2019     Priority: Medium     Septic shock (H) 03/27/2019     Priority: Medium     Coronary atherosclerosis 01/31/2018     Priority: Medium     Cough 01/31/2018     Priority: Medium     Elevated prostate specific antigen (PSA) 01/31/2018     Priority: Medium     Lumbago 01/31/2018     Priority: Medium     Migraine headache 01/31/2018     Priority: Medium     Nocturia 01/31/2018     Priority: Medium     Olecranon bursitis 01/31/2018     Priority: Medium     Other symptoms involving digestive system(787.99) 01/31/2018     Priority: Medium     Pain in joint, shoulder region 01/31/2018     Priority: Medium     Urinary frequency 01/31/2018     Priority: Medium     Aftercare following surgery of the musculoskeletal system 11/04/2014     Priority: Medium     Acute urinary retention 05/07/2014     Priority: Medium     ASCVD (arteriosclerotic cardiovascular disease) 05/07/2014     Priority: Medium     Overview:   S/p RCA stenting 2001       Diabetes mellitus, type 2 (H) 05/07/2014     Priority: Medium     Hyperlipidemia 05/07/2014     Priority: Medium     Hypertension 05/07/2014     Priority: Medium     Rheumatoid arthritis (H) 05/07/2014     Priority: Medium     Osteoarthritis of glenohumeral joint 02/13/2014     Priority: Medium     Prostate cancer (H) 08/27/2013     Priority: Medium     Overview:   S/p radition tx 1987 in [place]           Past Medical History:    Past Medical History:    Diagnosis Date     Atherosclerotic heart disease of native coronary artery without angina pectoris      Essential (primary) hypertension      Hyperlipidemia      Mononeuropathy of left lower extremity      Osteoarthritis      Personal history of malignant neoplasm of prostate      RA (rheumatoid arthritis) (H)      Rheumatoid arthritis (H)      Sjogren-Ashwin syndrome      Strain of muscle, fascia and tendon of long head of biceps, unspecified arm, initial encounter      Type 2 diabetes mellitus without complications (H)        Past Surgical History:    Past Surgical History:   Procedure Laterality Date     APPENDECTOMY OPEN           ARTHROSCOPY SHOULDER ROTATOR CUFF REPAIR      ,right, Jim Chanel MD     ARTHROTOMY WRIST           COLONOSCOPY           HEART CATH, ANGIOPLASTY      ,right coronary artery     OTHER SURGICAL HISTORY      2014,[id number],SHOULDER REPLACEMENT,Right,reverse     OTHER SURGICAL HISTORY      37911.0,NM BONE SCAN 3 PHASE,13 negative for  metastases     OTHER SURGICAL HISTORY      906440,OTHER,no evidence of metastases     OTHER SURGICAL HISTORY      454576,OTHER     OTHER SURGICAL HISTORY      001818,OTHER,right       Family History:    Family History   Problem Relation Age of Onset     Cancer Father         Cancer,throat cancer     Breast Cancer Sister         Cancer-breast       Social History:  Marital Status:   [5]  Social History     Tobacco Use     Smoking status: Former Smoker     Years: 30.00     Types: Cigarettes     Last attempt to quit: 1980     Years since quittin.7     Smokeless tobacco: Former User     Types: Chew     Quit date: 2014     Tobacco comment: Quit smoking: quit smoking 33 years ago. Chew's daily   Substance Use Topics     Alcohol use: No     Drug use: No     Types: Other     Comment: Drug use: No        Medications:      adalimumab (HUMIRA) 40 MG/0.8ML prefilled syringe kit   Artificial Saliva (BIOTENE  "MOISTURIZING MOUTH MT)   clotrimazole (LOTRIMIN) 1 % external cream   hydroxychloroquine (PLAQUENIL) 200 MG tablet   lisinopril (PRINIVIL/ZESTRIL) 20 MG tablet   metFORMIN (GLUCOPHAGE) 500 MG tablet   mirtazapine (REMERON) 15 MG tablet   Multiple Vitamins-Minerals (ICAPS AREDS 2) CAPS   mupirocin (BACTROBAN) 2 % external ointment   predniSONE (DELTASONE) 5 MG tablet   tamsulosin (FLOMAX) 0.4 MG capsule         Review of Systems   All other systems reviewed and are negative.      Physical Exam   BP: 96/65  Pulse: 95  Temp: 99  F (37.2  C)  Resp: 16  Height: 185.4 cm (6' 1\")  SpO2: 95 %      Physical Exam   Constitutional: He appears well-developed and well-nourished.   Alert, does not appear ill or toxic, no acute distress.  Oriented X1 only.  Cooperative.   HENT:   Head: Normocephalic and atraumatic.   Right Ear: External ear normal.   Left Ear: External ear normal.   Nose: Nose normal.   Mouth/Throat: Oropharynx is clear and moist.   Eyes: Pupils are equal, round, and reactive to light. Conjunctivae and EOM are normal.   Neck: Normal range of motion. Neck supple.   Cardiovascular: Normal rate, regular rhythm, normal heart sounds and intact distal pulses.   Pulmonary/Chest: Effort normal and breath sounds normal.   Abdominal: Soft. Bowel sounds are normal.   Musculoskeletal: Normal range of motion.   Neurological: He is alert.   Skin: Skin is warm. Capillary refill takes less than 2 seconds.   Nursing note and vitals reviewed.      ED Course        Procedures               Critical Care time:  none  The patient has signs of Severe Sepsis as evidenced by:    1. 2 SIRS criteria, AND  2. Suspected infection, AND   3. Organ dysfunction: Lactic Acid > 1.9 and Acute encephalopathy due to sepsis    Time severe sepsis diagnosis confirmed = 1720 as this was the time when Lactate resulted, and the level was > 1.9      3 Hour Severe Sepsis Bundle Completion:  1. Initial Lactic Acid Result:   Recent Labs   Lab Test " 09/21/19 2127 09/21/19  1720 03/29/19  0548   LACT 2.8* 2.1* 1.2     2. Blood Cultures before Antibiotics: No, antibiotics were started prior to blood culture collection because waiting for the blood culture to be collected would have resulted in a delay of 45 minutes or more to the administration of antibiotics.  3. Broad Spectrum Antibiotics Administered: Yes     Anti-infectives (From admission through now)    Start     Dose/Rate Route Frequency Ordered Stop    09/21/19 2221  vancomycin (VANCOCIN) 1,500 mg in sodium chloride 0.9 % 250 mL intermittent infusion      1,500 mg  over 90 Minutes Intravenous EVERY 12 HOURS 09/21/19 2220 09/21/19 2215  piperacillin-tazobactam (ZOSYN) intermittent infusion 4.5 g      4.5 g  200 mL/hr over 30 Minutes Intravenous ONCE 09/21/19 2214 09/21/19 2259 09/21/19 1858  ciprofloxacin (CIPRO) infusion 400 mg      400 mg  200 mL/hr over 60 Minutes Intravenous ONCE 09/21/19 1857 09/21/19 2047        4.  30 cc/kg bolus of lactated Ringer's estimated body weight of approximately 70-80 kg so approximately 2400cc of volume.  The patient is currently just passing the 2400 cc Aung, plan for 3 L of crystalloid.  Assess at that point    Ideal body weight: 79.9 kg (176 lb 2.4 oz)    Severe Sepsis reassessment:  1. Repeat Lactic Acid Level: 2.8  2. MAP>65 after initial IVF bolus, will continue to monitor fluid status and vital signs                           Results for orders placed or performed during the hospital encounter of 09/21/19 (from the past 24 hour(s))   CBC with platelets differential   Result Value Ref Range    WBC 11.0 4.0 - 11.0 10e9/L    RBC Count 3.34 (L) 4.4 - 5.9 10e12/L    Hemoglobin 9.7 (L) 13.3 - 17.7 g/dL    Hematocrit 30.0 (L) 40.0 - 53.0 %    MCV 90 78 - 100 fl    MCH 29.0 26.5 - 33.0 pg    MCHC 32.3 31.5 - 36.5 g/dL    RDW 15.9 (H) 10.0 - 15.0 %    Platelet Count 295 150 - 450 10e9/L    Diff Method Automated Method     % Neutrophils 91.4 %    % Lymphocytes 4.9 %     % Monocytes 2.5 %    % Eosinophils 0.5 %    % Basophils 0.3 %    % Immature Granulocytes 0.4 %    Nucleated RBCs 0 0 /100    Absolute Neutrophil 10.1 (H) 1.6 - 8.3 10e9/L    Absolute Lymphocytes 0.5 (L) 0.8 - 5.3 10e9/L    Absolute Monocytes 0.3 0.0 - 1.3 10e9/L    Absolute Eosinophils 0.1 0.0 - 0.7 10e9/L    Absolute Basophils 0.0 0.0 - 0.2 10e9/L    Abs Immature Granulocytes 0.0 0 - 0.4 10e9/L    Absolute Nucleated RBC 0.0    CRP inflammation   Result Value Ref Range    CRP Inflammation 53.5 (H) 0.0 - 8.0 mg/L   Comprehensive metabolic panel   Result Value Ref Range    Sodium 134 133 - 144 mmol/L    Potassium 4.0 3.4 - 5.3 mmol/L    Chloride 102 94 - 109 mmol/L    Carbon Dioxide 23 20 - 32 mmol/L    Anion Gap 9 3 - 14 mmol/L    Glucose 318 (H) 70 - 99 mg/dL    Urea Nitrogen 17 7 - 30 mg/dL    Creatinine 0.90 0.66 - 1.25 mg/dL    GFR Estimate 77 >60 mL/min/[1.73_m2]    GFR Estimate If Black 89 >60 mL/min/[1.73_m2]    Calcium 8.4 (L) 8.5 - 10.1 mg/dL    Bilirubin Total 0.4 0.2 - 1.3 mg/dL    Albumin 2.4 (L) 3.4 - 5.0 g/dL    Protein Total 6.8 6.8 - 8.8 g/dL    Alkaline Phosphatase 64 40 - 150 U/L    ALT 17 0 - 70 U/L    AST 12 0 - 45 U/L   Lactic acid whole blood   Result Value Ref Range    Lactic Acid 2.1 (H) 0.7 - 2.0 mmol/L   Lipase   Result Value Ref Range    Lipase 280 73 - 393 U/L   UA with Microscopic reflex to Culture   Result Value Ref Range    Color Urine Yellow     Appearance Urine Cloudy     Glucose Urine 150 (A) NEG^Negative mg/dL    Bilirubin Urine Negative NEG^Negative    Ketones Urine Negative NEG^Negative mg/dL    Specific Gravity Urine 1.011 1.003 - 1.035    Blood Urine Small (A) NEG^Negative    pH Urine 7.0 5.0 - 7.0 pH    Protein Albumin Urine Negative NEG^Negative mg/dL    Urobilinogen mg/dL 0.0 0.0 - 2.0 mg/dL    Nitrite Urine Negative NEG^Negative    Leukocyte Esterase Urine Moderate (A) NEG^Negative    Source Catheterized Urine     WBC Urine 65 (H) 0 - 5 /HPF    RBC Urine >182 (H) 0 - 2  /HPF    Bacteria Urine Few (A) NEG^Negative /HPF    Yeast Urine Many (A) NEG^Negative /HPF    Calcium Oxalate Few (A) NEG^Negative /HPF   Urine Culture   Result Value Ref Range    Specimen Description Catheterized Urine     Special Requests Specimen received in preservative     Culture Micro PENDING    Lactic acid whole blood   Result Value Ref Range    Lactic Acid 2.8 (H) 0.7 - 2.0 mmol/L       Medications   lactated ringers BOLUS 1,000 mL (0 mLs Intravenous Stopped 9/21/19 1900)     Followed by   lactated ringers infusion (0 mLs Intravenous Stopped 9/21/19 2229)   lactated ringers BOLUS 1,000 mL (1,000 mLs Intravenous New Bag 9/21/19 2302)     Followed by   lactated ringers infusion (has no administration in time range)   vancomycin (VANCOCIN) 1,500 mg in sodium chloride 0.9 % 250 mL intermittent infusion (1,500 mg Intravenous New Bag 9/21/19 2302)   lactated ringers BOLUS 1,000 mL (0 mLs Intravenous Stopped 9/21/19 2126)   ciprofloxacin (CIPRO) infusion 400 mg (0 mg Intravenous Stopped 9/21/19 2047)   piperacillin-tazobactam (ZOSYN) intermittent infusion 4.5 g (0 g Intravenous Stopped 9/21/19 2259)   6:45 PM  When this patient was initially seen a urinalysis was ordered and is considered a priority as the patient has had sepsis with a urinary focus recently.  When it had not been obtained approximately an hour and 15 minutes after the patient's arrival I spoke with the patient's nurse and again reinforced the importance of obtaining this.  I see that it has not been entered into the system or is being run at this point unfortunately.  I spoke to the nurse again and requested the urinalysis be obtained.  The patient slightly elevated lactate he has been given fluids intravenously and I will empirically treat him with IV Rocephin for possible UTI while we await the urinalysis.  I was subsequently contacted by my inpatient pharmacist who recommended the patient received Cipro initially based on the sensitivities  that they were able to reviewed from the most recent urinalysis.  I subsequently reviewed the care everywhere urinalysis, unfortunately the urine culture is not reported in care everywhere.  I was able to obtain some details from the hospitalist note from the admission 9/5/2019 through 9/10/2019.  This appeared to be a Pseudomonas urosepsis primarily and the patient was treated initially empirically with Zosyn and vancomycin to good effect there was no concern regarding reaction or allergy given the patient's penicillin allergy.  I subsequently ordered Zosyn and vancomycin for him.  10:55 PM  Patient was discussed for admission with [facility] for .  They agreed to accept the patient to the care of their service, general medical bed floor bed, transition orders are placed by myself in the emergency department at time of admission.        Assessments & Plan (with Medical Decision Making)   Assessment and plan with medical decision making at the time stamps above.  The patient is discussed with the hospitalist service agree with the management thus far and proposed admission to inpatient status for ongoing volume replacement, antibiotic administration, observation for potential deterioration.      Disclaimer: This note consists of symbols derived from keyboarding, dictation and/or voice recognition software. As a result, there may be errors in the script that have gone undetected. Please consider this when interpreting information found in this chart.      I have reviewed the nursing notes.    I have reviewed the findings, diagnosis, plan and need for follow up with the patient.       New Prescriptions    No medications on file       Final diagnoses:   UTI (urinary tract infection), bacterial   Elevated serum lactate dehydrogenase       9/21/2019   [facility] EMERGENCY DEPARTMENT     Foreign Casarez MD  09/21/19 6854

## 2019-09-21 NOTE — ED NOTES
Pt here with a fever that started today, 101.7 at home down to 101.2 after tylenol was given. Was recently released from the hospital with urosepsis. Pt is weaker and more confused than normal. Has a martinez for urine retention.

## 2019-09-22 LAB
ANION GAP SERPL CALCULATED.3IONS-SCNC: 6 MMOL/L (ref 3–14)
BUN SERPL-MCNC: 13 MG/DL (ref 7–30)
CALCIUM SERPL-MCNC: 8.1 MG/DL (ref 8.5–10.1)
CHLORIDE SERPL-SCNC: 105 MMOL/L (ref 94–109)
CO2 SERPL-SCNC: 28 MMOL/L (ref 20–32)
CREAT SERPL-MCNC: 0.78 MG/DL (ref 0.66–1.25)
ERYTHROCYTE [DISTWIDTH] IN BLOOD BY AUTOMATED COUNT: 15.9 % (ref 10–15)
GFR SERPL CREATININE-BSD FRML MDRD: 82 ML/MIN/{1.73_M2}
GLUCOSE BLDC GLUCOMTR-MCNC: 202 MG/DL (ref 70–99)
GLUCOSE BLDC GLUCOMTR-MCNC: 228 MG/DL (ref 70–99)
GLUCOSE BLDC GLUCOMTR-MCNC: 285 MG/DL (ref 70–99)
GLUCOSE BLDC GLUCOMTR-MCNC: 289 MG/DL (ref 70–99)
GLUCOSE SERPL-MCNC: 238 MG/DL (ref 70–99)
HBA1C MFR BLD: 10.3 % (ref 0–5.6)
HCT VFR BLD AUTO: 27.6 % (ref 40–53)
HGB BLD-MCNC: 9 G/DL (ref 13.3–17.7)
LACTATE BLD-SCNC: 1.1 MMOL/L (ref 0.7–2)
MCH RBC QN AUTO: 29.3 PG (ref 26.5–33)
MCHC RBC AUTO-ENTMCNC: 32.6 G/DL (ref 31.5–36.5)
MCV RBC AUTO: 90 FL (ref 78–100)
PLATELET # BLD AUTO: 260 10E9/L (ref 150–450)
POTASSIUM SERPL-SCNC: 3.3 MMOL/L (ref 3.4–5.3)
RBC # BLD AUTO: 3.07 10E12/L (ref 4.4–5.9)
SODIUM SERPL-SCNC: 139 MMOL/L (ref 133–144)
VANCOMYCIN SERPL-MCNC: 15 MG/L
WBC # BLD AUTO: 9.4 10E9/L (ref 4–11)

## 2019-09-22 PROCEDURE — 25000131 ZZH RX MED GY IP 250 OP 636 PS 637: Performed by: PHYSICIAN ASSISTANT

## 2019-09-22 PROCEDURE — 99223 1ST HOSP IP/OBS HIGH 75: CPT | Mod: AI | Performed by: INTERNAL MEDICINE

## 2019-09-22 PROCEDURE — 85027 COMPLETE CBC AUTOMATED: CPT | Performed by: PHYSICIAN ASSISTANT

## 2019-09-22 PROCEDURE — 25000132 ZZH RX MED GY IP 250 OP 250 PS 637: Performed by: INTERNAL MEDICINE

## 2019-09-22 PROCEDURE — 00000146 ZZHCL STATISTIC GLUCOSE BY METER IP

## 2019-09-22 PROCEDURE — 36415 COLL VENOUS BLD VENIPUNCTURE: CPT | Performed by: INTERNAL MEDICINE

## 2019-09-22 PROCEDURE — 80048 BASIC METABOLIC PNL TOTAL CA: CPT | Performed by: PHYSICIAN ASSISTANT

## 2019-09-22 PROCEDURE — 12000000 ZZH R&B MED SURG/OB

## 2019-09-22 PROCEDURE — 25000128 H RX IP 250 OP 636: Performed by: FAMILY MEDICINE

## 2019-09-22 PROCEDURE — 83036 HEMOGLOBIN GLYCOSYLATED A1C: CPT | Performed by: PHYSICIAN ASSISTANT

## 2019-09-22 PROCEDURE — 80202 ASSAY OF VANCOMYCIN: CPT | Performed by: INTERNAL MEDICINE

## 2019-09-22 PROCEDURE — 83605 ASSAY OF LACTIC ACID: CPT | Performed by: INTERNAL MEDICINE

## 2019-09-22 PROCEDURE — 36415 COLL VENOUS BLD VENIPUNCTURE: CPT | Performed by: PHYSICIAN ASSISTANT

## 2019-09-22 PROCEDURE — 25800030 ZZH RX IP 258 OP 636: Performed by: FAMILY MEDICINE

## 2019-09-22 RX ORDER — DEXTROSE MONOHYDRATE 25 G/50ML
25-50 INJECTION, SOLUTION INTRAVENOUS
Status: DISCONTINUED | OUTPATIENT
Start: 2019-09-22 | End: 2019-09-24 | Stop reason: HOSPADM

## 2019-09-22 RX ORDER — NITROFURANTOIN MACROCRYSTAL 100 MG
100 CAPSULE ORAL DAILY
COMMUNITY
Start: 2019-09-19

## 2019-09-22 RX ORDER — L. ACIDOPHILUS/PECTIN, CITRUS 25MM-100MG
1 TABLET ORAL DAILY
Status: ON HOLD | COMMUNITY
End: 2020-06-08

## 2019-09-22 RX ORDER — LISINOPRIL 10 MG/1
10 TABLET ORAL EVERY MORNING
Status: DISCONTINUED | OUTPATIENT
Start: 2019-09-23 | End: 2019-09-24 | Stop reason: HOSPADM

## 2019-09-22 RX ORDER — CIPROFLOXACIN 2 MG/ML
400 INJECTION, SOLUTION INTRAVENOUS EVERY 24 HOURS
Status: DISCONTINUED | OUTPATIENT
Start: 2019-09-22 | End: 2019-09-22

## 2019-09-22 RX ORDER — MIRTAZAPINE 15 MG/1
15 TABLET, FILM COATED ORAL AT BEDTIME
Status: DISCONTINUED | OUTPATIENT
Start: 2019-09-22 | End: 2019-09-24 | Stop reason: HOSPADM

## 2019-09-22 RX ORDER — HYDROXYCHLOROQUINE SULFATE 200 MG/1
200 TABLET, FILM COATED ORAL EVERY MORNING
Status: DISCONTINUED | OUTPATIENT
Start: 2019-09-23 | End: 2019-09-24 | Stop reason: HOSPADM

## 2019-09-22 RX ORDER — FERROUS SULFATE 325(65) MG
325 TABLET, DELAYED RELEASE (ENTERIC COATED) ORAL DAILY
COMMUNITY

## 2019-09-22 RX ORDER — NICOTINE POLACRILEX 4 MG
15-30 LOZENGE BUCCAL
Status: DISCONTINUED | OUTPATIENT
Start: 2019-09-22 | End: 2019-09-24 | Stop reason: HOSPADM

## 2019-09-22 RX ORDER — LACTOBACILLUS RHAMNOSUS GG 10B CELL
1 CAPSULE ORAL 2 TIMES DAILY
Status: DISCONTINUED | OUTPATIENT
Start: 2019-09-22 | End: 2019-09-24 | Stop reason: HOSPADM

## 2019-09-22 RX ADMIN — SODIUM CHLORIDE, POTASSIUM CHLORIDE, SODIUM LACTATE AND CALCIUM CHLORIDE 1000 ML: 600; 310; 30; 20 INJECTION, SOLUTION INTRAVENOUS at 19:06

## 2019-09-22 RX ADMIN — VANCOMYCIN HYDROCHLORIDE 1500 MG: 10 INJECTION, POWDER, LYOPHILIZED, FOR SOLUTION INTRAVENOUS at 10:19

## 2019-09-22 RX ADMIN — SODIUM CHLORIDE, POTASSIUM CHLORIDE, SODIUM LACTATE AND CALCIUM CHLORIDE 1000 ML: 600; 310; 30; 20 INJECTION, SOLUTION INTRAVENOUS at 08:03

## 2019-09-22 RX ADMIN — METFORMIN HYDROCHLORIDE 500 MG: 500 TABLET, FILM COATED ORAL at 22:43

## 2019-09-22 RX ADMIN — TAZOBACTAM SODIUM AND PIPERACILLIN SODIUM 3.38 G: 375; 3 INJECTION, SOLUTION INTRAVENOUS at 20:10

## 2019-09-22 RX ADMIN — MIRTAZAPINE 15 MG: 15 TABLET, FILM COATED ORAL at 22:43

## 2019-09-22 RX ADMIN — INSULIN ASPART 6 UNITS: 100 INJECTION, SOLUTION INTRAVENOUS; SUBCUTANEOUS at 11:51

## 2019-09-22 RX ADMIN — INSULIN ASPART 6 UNITS: 100 INJECTION, SOLUTION INTRAVENOUS; SUBCUTANEOUS at 17:04

## 2019-09-22 RX ADMIN — INSULIN ASPART 4 UNITS: 100 INJECTION, SOLUTION INTRAVENOUS; SUBCUTANEOUS at 08:25

## 2019-09-22 RX ADMIN — VANCOMYCIN HYDROCHLORIDE 1500 MG: 10 INJECTION, POWDER, LYOPHILIZED, FOR SOLUTION INTRAVENOUS at 22:43

## 2019-09-22 RX ADMIN — TAZOBACTAM SODIUM AND PIPERACILLIN SODIUM 3.38 G: 375; 3 INJECTION, SOLUTION INTRAVENOUS at 14:17

## 2019-09-22 RX ADMIN — SODIUM CHLORIDE, POTASSIUM CHLORIDE, SODIUM LACTATE AND CALCIUM CHLORIDE 1000 ML: 600; 310; 30; 20 INJECTION, SOLUTION INTRAVENOUS at 00:11

## 2019-09-22 RX ADMIN — TAZOBACTAM SODIUM AND PIPERACILLIN SODIUM 3.38 G: 375; 3 INJECTION, SOLUTION INTRAVENOUS at 08:03

## 2019-09-22 RX ADMIN — Medication 1 CAPSULE: at 22:43

## 2019-09-22 ASSESSMENT — ACTIVITIES OF DAILY LIVING (ADL)
ADLS_ACUITY_SCORE: 25
ADLS_ACUITY_SCORE: 26
ADLS_ACUITY_SCORE: 27

## 2019-09-22 NOTE — PROGRESS NOTES
"Patient's chart reviewed, discussed with emergency department for planned admission.  Unable to see patient prior to end of shift or Epic downtime.    History per emergency department:  Patient lives in nursing home, reported fever 101.7 there today.  Family reported increased confusion for 24-36 hours prior to presenting to the emergency department (apparently has some baseline dementia).  Has a chronic indwelling Murcia.  Patient recently at [facility] Sep 5-10, 2019 for urosepsis. Per CareEverywhere discharge summary 9/10/19:  \"UA was grossly positive and  UC growing multiple bacterial morphologies. Blood culture from 9/5 growing pseudomonas/GNB in 2/2 sets, presumed to be due to UTI. Repeat blood cultures NGTD. Leukocytosis and fever has resolved following IV Zosyn, transitioned to PO Cipro, will complete 2 week course.\"    UA in the emergency department was grossly abnormal, although occurs in setting of chronic Murcia.   Afebrile, no leukocytosis upon admission, although does have confusion.   Lactic acid 2.1 - > 2.8 despite 2L LR bolus.  Started on Zosyn, Cipro, and vancomycin in the emergency department    Plan:  - Continue Zosyn, vancomycin   - No additional Cipro ordered - it appears he was transitioned to this orally at [facility] after Zosyn and vancomycin  - Urine culture pending  - Maintenance LR @ 125 ml/hr  - Recheck CBC, BMP, lactic acid in the morning  - Med rec not completed, meds not ordered  - Full H&P to be done in the morning    Camryn Zavala PA-C on 9/22/2019 at 1:51 AM     "

## 2019-09-22 NOTE — PROGRESS NOTES
"[facility] ADMISSION NOTE    Patient admitted to room 2308 at approximately 0015 via cart from emergency room. Patient was accompanied by transport tech.     Verbal SBAR report received from parvin NICOLAS prior to patient arrival.     Patient ambulated to bed with one assist. Patient alert and oriented X 1. The patient is not having any pain.  . Admission vital signs: Blood pressure (!) 140/82, pulse 75, temperature 97.7  F (36.5  C), temperature source Oral, resp. rate 16, height 1.88 m (6' 2\"), SpO2 99 %. Patient was oriented to plan of care, call light, bed controls, tv, telephone, bathroom and visiting hours.     Risk Assessment    The following safety risks were identified during admission: fall. Yellow risk band applied: YES.     Skin Initial Assessment    This writer admitted this patient and completed a full skin assessment and Wilfred score in the Adult PCS flowsheet. Appropriate interventions initiated as needed.     Secondary skin check.  Ria Bear and I did  Admit together due to imminent down time.    Pt is very confused, very pleasant.  Dentation is very bad, teeth decaying, ground, unbrushed , but it seems the norm for him.  Pt Skin condition is good.  Pt tall and bed extender was added before admission.  Martinez draining cloudy yellow, swtched to standard bag.  Scrotum red and peeling, tip of penis sl red, but martinez was pulling with leg bag not attatched properly.  Now has stat lock.  Pt wandered all over when being questioned.  Asked what season it was and he thought it might be be hunting season.  Pt  Rolled over and went to sleep as soon as we stopped asking questions.  CANDIE Barrientos RN  Computer downtime at 0200       Viktoria Barrientos RN    "

## 2019-09-22 NOTE — PLAN OF CARE
Patient alert and oriented to self.  Pleasantly confused.  Temperature 99.1 oraly today.  Denies pain, nausea or SHORTENSS OF AIR.  Up with STAND BY ASSIST to BATHROOM and to chair x 2 today.  Eating well.  Large BM today.  Urine output was 1800 ml for day shift.

## 2019-09-22 NOTE — H&P
[facility]    History and Physical  Hospital Medicine       Date of Admission:  9/21/2019  Date of Service: 9/22/2019     Assessment & Plan   aMxime Powell is a 85 year old male with past medical history significant for septic shock due to UTI, atrial fibrillation with RVR, ASCVD, chronic low back pain, migraine headache, type 2 diabetes, hyperlipidemia, hypertension, rheumatoid arthritis and prostate cancer who now presents with family on 9/21/2019 with fever.       UTI (urinary tract infection) due to indwelling catheter    Indwelling catheter due to prior treatment for prostate cancer   H/o prior urosepsis, most recently a couple of weeks ago due to Pseudomonas. UA abnormal 62 WBC and RBC > 182. WBC 11.0 with 91% neutrophils. High risk for progressive infection given history. Started on broad spectrum antibiotics with Zosyn and vancomycin.    - continue Zosyn and vancomycin   - await UC results      Diabetes mellitus, type 2 (H), uncontrolled   BG running high. HgA1c 10.3 so poor control at baseline. Good renal function.    - high sliding scale insulin    - start NPH 12 units BID while hospitalized, titrate   - resume metformin      ASCVD (arteriosclerotic cardiovascular disease)   RCA stent in 2001. Does not appear symptomatic.       H/o atrial fibrillation with RVR associated with episode of sepsis   Regular on exam. Not on rate control or anticoagulation.        Rheumatoid arthritis (H)   On plaquenil, low dose daily prednisone and Humira per med list. Immunocompromised especially from the Humira.   - continue RA treatment with Plaquenil and prednisone   - does not need stress dose steroid at this time      Dementia    On Remeron at HS, continue     DVT Prophylaxis: Low Risk/Ambulatory with no VTE prophylaxis indicated. Patient moving.  Code Status: DNR / DNI    Lines: PIV   Murcia catheter: Indwelling    Disposition: Anticipate discharge in 1-3 day(s) once UC returns and patient  continues improvement. Appropriate for inpatient admission    Ozzy James MD  Hospital Medicine        Primary Care Physician   Center, [facility] None    History is obtained from the patient who is a poor historian, review of ED notes and review of old records via the EMR.    History of Present Illness   Maxime Powell is a 85 year old male who presents with fever and increasing confusion.  Patient has a history of urosepsis on more than one occasion, most recently a couple weeks ago treated at [facility].  Cultures were positive for Pseudomonas and patient was treated with Zosyn in the hospital and discharged on ciprofloxacin.  He has an indwelling Murcia catheter to to sequela of prostate cancer.  His underlying dementia.  Family reported fever to 101.7 at the patient's care facility.  No URI symptoms or cough.    Patient says he feels better as I see him in the morning after admission.  He says he had an upset stomach that is now better.  Denies chest pain or shortness of breath.  Denies cough.  Denies abdominal pain.  Denies neck pain or headache.    Review of Systems   The 10 point Review of Systems was attempted and others than having had some abdominal complaints he denies all other questions and is not clear if this is reliable.    Past Medical History    Past Medical History:   Diagnosis Date     Atherosclerotic heart disease of native coronary artery without angina pectoris     RCA stenting 2001     Essential (primary) hypertension     No Comments Provided     Hyperlipidemia     No Comments Provided     Mononeuropathy of left lower extremity     R/T diabetes     Osteoarthritis     No Comments Provided     Personal history of malignant neoplasm of prostate     1987,radiation     RA (rheumatoid arthritis) (H)      Rheumatoid arthritis (H)     1996     Sjogren-Ashwin syndrome      Strain of muscle, fascia and tendon of long head of biceps, unspecified arm, initial encounter      2003,right     Type 2 diabetes mellitus without complications (H)     2002,Type II       Septic shock due to urinary tract infection (H) 03/27/2019     Priority: Medium     Urinary tract infection associated with indwelling urinary catheter (H) 03/27/2019     Priority: Medium     Immunosuppressed status (H) 03/27/2019     Priority: Medium     Atrial fibrillation with rapid ventricular response (H) 03/27/2019     Priority: Medium     Septic shock (H) 03/27/2019     Priority: Medium     Coronary atherosclerosis 01/31/2018     Priority: Medium     Cough 01/31/2018     Priority: Medium     Elevated prostate specific antigen (PSA) 01/31/2018     Priority: Medium     Lumbago 01/31/2018     Priority: Medium     Migraine headache 01/31/2018     Priority: Medium     Nocturia 01/31/2018     Priority: Medium     Olecranon bursitis 01/31/2018     Priority: Medium     Other symptoms involving digestive system(787.99) 01/31/2018     Priority: Medium     Pain in joint, shoulder region 01/31/2018     Priority: Medium     Urinary frequency 01/31/2018     Priority: Medium     Aftercare following surgery of the musculoskeletal system 11/04/2014     Priority: Medium     Acute urinary retention 05/07/2014     Priority: Medium     ASCVD (arteriosclerotic cardiovascular disease) 05/07/2014     Priority: Medium     Overview:   S/p RCA stenting 2001       Diabetes mellitus, type 2 (H) 05/07/2014     Priority: Medium     Hyperlipidemia 05/07/2014     Priority: Medium     Hypertension 05/07/2014     Priority: Medium     Rheumatoid arthritis (H) 05/07/2014     Priority: Medium     Osteoarthritis of glenohumeral joint 02/13/2014     Priority: Medium     Prostate cancer (H) 08/27/2013     Priority: Medium     Overview:   S/p radition tx 1987 in [place]           Past Surgical History   Past Surgical History:   Procedure Laterality Date     APPENDECTOMY OPEN      1960     ARTHROSCOPY SHOULDER ROTATOR CUFF REPAIR      2002,right, Jim Chanel MD      ARTHROTOMY WRIST      1986     COLONOSCOPY      2014     HEART CATH, ANGIOPLASTY      2001,right coronary artery     OTHER SURGICAL HISTORY      05/07/2014,[id number],SHOULDER REPLACEMENT,Right,reverse     OTHER SURGICAL HISTORY      42643.0,NM BONE SCAN 3 PHASE,09/06/13 negative for  metastases     OTHER SURGICAL HISTORY      976726,OTHER,no evidence of metastases     OTHER SURGICAL HISTORY      050370,OTHER     OTHER SURGICAL HISTORY      758343,OTHER,right        Prior to Admission Medications   Prior to Admission Medications   Prescriptions Last Dose Informant Patient Reported? Taking?   Artificial Saliva (BIOTENE MOISTURIZING MOUTH MT) 9/21/2019 at 0800 Nursing Home Yes Yes   Sig: Take 1 spray by mouth 2 times daily   Lactobacillus Acid-Pectin (LACTOBACILLUS ACIDOPHILUS) TABS   Yes Yes   Sig: Take 1 tablet by mouth daily   adalimumab (HUMIRA) 40 MG/0.8ML prefilled syringe kit   No No   Sig: Inject 0.8 mLs (40 mg) Subcutaneous every 14 days   ferrous sulfate (FE TABS) 325 (65 Fe) MG EC tablet   Yes Yes   Sig: Take 325 mg by mouth daily Take with water and meal   hydroxychloroquine (PLAQUENIL) 200 MG tablet 9/21/2019 at 0800  No Yes   Sig: Take 1 tablet (200 mg) by mouth every morning   lisinopril (PRINIVIL/ZESTRIL) 20 MG tablet 9/21/2019 at 0800 Nursing Home Yes Yes   Sig: Take 10 mg by mouth every morning    metFORMIN (GLUCOPHAGE) 500 MG tablet 9/21/2019 at 0800 Nursing Home Yes Yes   Sig: Take One tablet (500 mg) by mouth twice daily   mirtazapine (REMERON) 15 MG tablet Past Week at 2100 Nursing Home Yes Yes   Sig: Take 15 mg by mouth At Bedtime    nitroFURantoin macrocrystal (MACRODANTIN) 100 MG capsule   Yes Yes   Sig: Take 100 mg by mouth daily   predniSONE (DELTASONE) 5 MG tablet 9/21/2019 at 0800 Nursing Home Yes Yes   Sig: Take 7.5 mg by mouth daily (with breakfast) .      Facility-Administered Medications: None     Allergies   Allergies   Allergen Reactions     Finasteride      Other reaction(s):  Gynecomastia  Required tamoxifen treatment     Penicillins      Other reaction(s): Edema, Erythema     Gold Rash     Injectable gold       Family History    Family History   Problem Relation Age of Onset     Cancer Father         Cancer,throat cancer     Breast Cancer Sister         Cancer-breast         Social History   Social History     Socioeconomic History     Marital status:      Spouse name: Not on file     Number of children: Not on file     Years of education: Not on file     Highest education level: Not on file   Occupational History     Not on file   Social Needs     Financial resource strain: Not on file     Food insecurity:     Worry: Not on file     Inability: Not on file     Transportation needs:     Medical: Not on file     Non-medical: Not on file   Tobacco Use     Smoking status: Former Smoker     Years: 30.00     Types: Cigarettes     Last attempt to quit: 1980     Years since quittin.7     Smokeless tobacco: Former User     Types: Chew     Quit date: 2014     Tobacco comment: Quit smoking: quit smoking 33 years ago. Chew's daily   Substance and Sexual Activity     Alcohol use: No     Drug use: No     Types: Other     Comment: Drug use: No     Sexual activity: Never   Lifestyle     Physical activity:     Days per week: Not on file     Minutes per session: Not on file     Stress: Not on file   Relationships     Social connections:     Talks on phone: Not on file     Gets together: Not on file     Attends Nondenominational service: Not on file     Active member of club or organization: Not on file     Attends meetings of clubs or organizations: Not on file     Relationship status: Not on file     Intimate partner violence:     Fear of current or ex partner: Not on file     Emotionally abused: Not on file     Physically abused: Not on file     Forced sexual activity: Not on file   Other Topics Concern     Not on file   Social History Narrative    3 living children, daughter ,  "oldest sone saray/Hodgkins. Was in [employer]. , wife - (Clarissa)        Physical Exam   BP (!) 150/64   Pulse 82   Temp 97.5  F (36.4  C) (Oral)   Resp 18   Ht 1.88 m (6' 2\")   SpO2 95%   BMI 20.67 kg/m       Weight: 0 lbs 0 oz Body mass index is 20.67 kg/m .     Constitutional: Alert, oriented to self and that he is not at home but does not recognize hospital, is not oriented to age or month. Patient is cooperative and interactive, no apparent distress, appears weak but otherwise nontoxic.   Eyes: Eyes are clear, pupils are reactive.  HEENT: Oropharynx is clear and very dry. No evidence of cranial trauma.  Lymph/Hematologic: No epitrochlear, axillary, anterior or posterior cervical, or supraclavicular lymphadenopathy is appreciated.  Cardiovascular: Regular rate and rhythm, and no murmur noted. JVP is normal. Good peripheral pulses in wrists bilaterally. 1+ bilateral lower extremity edema.  Respiratory: A few crackles in bases partially clear with deep breaths otherwise clear to auscultation.   GI: Soft, non-tender, normal bowel sounds, no hepatosplenomegaly, very thin.  Genitourinary: Deferred  Musculoskeletal: decreased muscle bulk and normal tone for age.  Skin: Warm and dry, no rashes.   Neurologic: Neck supple. Cranial nerves are grossly intact.  is symmetric.     Data   Data reviewed today:   Recent Labs   Lab 09/22/19  0533 09/21/19  1720   WBC 9.4 11.0   HGB 9.0* 9.7*   MCV 90 90    295    134   POTASSIUM 3.3* 4.0   CHLORIDE 105 102   CO2 28 23   BUN 13 17   CR 0.78 0.90   ANIONGAP 6 9   LAISHA 8.1* 8.4*   * 318*   ALBUMIN  --  2.4*   PROTTOTAL  --  6.8   BILITOTAL  --  0.4   ALKPHOS  --  64   ALT  --  17   AST  --  12   LIPASE  --  280       No results found for this or any previous visit (from the past 24 hour(s)).    I personally reviewed no images or EKG's today.    Ozzy James MD  [facility] Medicine         "

## 2019-09-22 NOTE — CONSULTS
Care Transition Initial Assessment - RN      Met with: Family.    DATA  Principal Problem:    UTI (urinary tract infection)  Active Problems:    ASCVD (arteriosclerotic cardiovascular disease)    Coronary atherosclerosis    Diabetes mellitus, type 2 (H)    Rheumatoid arthritis (H)       [facility]     [facility] information and PCP information verified: Yes  Lives With: facility resident      Quality of Family Relationships: involved, helpful, supportive  Description of Support System: Supportive, Involved   Who is your support system?: Children   Support Assessment: Adequate family and caregiver support   Insurance concerns: No Insurance issues identified        This writer spoke with sonCheri, JUAN introduced self and role.  Patient currently lives at [facility] ([phone]).  Per FIDENCIO Larkin, It is a secured/memory care facility.  He receives help with all ADLs, medication management.  He uses a walker to help with ambulation.  He is open with [facility] (phone: [phone] Fax: [phone]) for RN services to help with catheter care and humira injections.  Discussed discharge planning and Medicare guidelines in regards to home care, TCU and LTC.  At maximum, [facility] able to accomodate help of assist of one.  Cheri and patient's goal is to return to [facility] with current home care.  Call made to [facility] , faxed preliminary paperwork to resume current home care at discharge.  Order placed for resumption of service.  Cheri would provide transportation upon discharge.      PLAN    [facility] w/ resumption of HC    Kaylan Sanchez RN  Inpatient Care Coordinator  [facility] [phone]  [facility] [phone]

## 2019-09-22 NOTE — PROGRESS NOTES
Skin affirmation note    Admitting nurse completed full skin assessment, Wilfred score and Wilfred interventions. This writer agrees with the initial skin assessment findings.    Ria Morfin

## 2019-09-22 NOTE — PROGRESS NOTES
Writer contacted Jesse at [facility] and reconciled medications with her. PTA medications updated.

## 2019-09-22 NOTE — ED NOTES
DATE:  9/21/2019   TIME OF RECEIPT FROM LAB:  2156  LAB TEST:  Lactic acid  LAB VALUE:  2.8  RESULTS GIVEN WITH READ-BACK TO (PROVIDER):  Foreign Casarez MD  TIME LAB VALUE REPORTED TO PROVIDER:   9596

## 2019-09-22 NOTE — PHARMACY-VANCOMYCIN DOSING SERVICE
Pharmacy Vancomycin Initial Note  Date of Service 2019  Patient's  [birth year]  85 year old, male    Indication: Sepsis and Urinary Tract Infection    Current estimated CrCl = Estimated Creatinine Clearance: 62 mL/min (based on SCr of 0.9 mg/dL).    Creatinine for last 3 days  2019:  5:20 PM Creatinine 0.90 mg/dL    Recent Vancomycin Level(s) for last 3 days  No results found for requested labs within last 72 hours.      Vancomycin IV Administrations (past 72 hours)      No vancomycin orders with administrations in past 72 hours.                Nephrotoxins and other renal medications (From now, onward)    Start     Dose/Rate Route Frequency Ordered Stop    19 2221  vancomycin (VANCOCIN) 1,500 mg in sodium chloride 0.9 % 250 mL intermittent infusion      1,500 mg  over 90 Minutes Intravenous EVERY 12 HOURS 19 2220      19 2215  piperacillin-tazobactam (ZOSYN) intermittent infusion 4.5 g      4.5 g  200 mL/hr over 30 Minutes Intravenous ONCE 19 2214            Contrast Orders - past 72 hours (72h ago, onward)    None                Plan:  1.  Start vancomycin  1500 mg IV q12h.   2.  Goal Trough Level: 15-20 mg/L   3.  Pharmacy will check trough levels as appropriate in 1-3 Days.    4. Serum creatinine levels will be ordered daily for the first week of therapy and at least twice weekly for subsequent weeks.    5. [place] method utilized to dose vancomycin therapy: Method 1    Alysha Humphrey [facility]

## 2019-09-22 NOTE — ED NOTES
Patient has [facility] benefits that do not include transportation.  Family called at the request of the VA officer of the day and family elects to admit to [facility].  Family does identify that patient has [employer] insurance.   Alysha

## 2019-09-23 ENCOUNTER — APPOINTMENT (OUTPATIENT)
Dept: ULTRASOUND IMAGING | Facility: CLINIC | Age: 84
DRG: 700 | End: 2019-09-23
Attending: INTERNAL MEDICINE
Payer: COMMERCIAL

## 2019-09-23 LAB
GLUCOSE BLDC GLUCOMTR-MCNC: 187 MG/DL (ref 70–99)
GLUCOSE BLDC GLUCOMTR-MCNC: 207 MG/DL (ref 70–99)
GLUCOSE BLDC GLUCOMTR-MCNC: 269 MG/DL (ref 70–99)
GLUCOSE BLDC GLUCOMTR-MCNC: 282 MG/DL (ref 70–99)
GLUCOSE BLDC GLUCOMTR-MCNC: 316 MG/DL (ref 70–99)

## 2019-09-23 PROCEDURE — 00000146 ZZHCL STATISTIC GLUCOSE BY METER IP

## 2019-09-23 PROCEDURE — 76770 US EXAM ABDO BACK WALL COMP: CPT

## 2019-09-23 PROCEDURE — 99232 SBSQ HOSP IP/OBS MODERATE 35: CPT | Performed by: INTERNAL MEDICINE

## 2019-09-23 PROCEDURE — 25800030 ZZH RX IP 258 OP 636: Performed by: FAMILY MEDICINE

## 2019-09-23 PROCEDURE — 25000131 ZZH RX MED GY IP 250 OP 636 PS 637: Performed by: INTERNAL MEDICINE

## 2019-09-23 PROCEDURE — 25000132 ZZH RX MED GY IP 250 OP 250 PS 637: Performed by: INTERNAL MEDICINE

## 2019-09-23 PROCEDURE — 25000128 H RX IP 250 OP 636: Performed by: FAMILY MEDICINE

## 2019-09-23 PROCEDURE — 12000000 ZZH R&B MED SURG/OB

## 2019-09-23 RX ORDER — TAMSULOSIN HYDROCHLORIDE 0.4 MG/1
0.4 CAPSULE ORAL DAILY
COMMUNITY

## 2019-09-23 RX ORDER — TAMSULOSIN HYDROCHLORIDE 0.4 MG/1
0.4 CAPSULE ORAL DAILY
Status: DISCONTINUED | OUTPATIENT
Start: 2019-09-23 | End: 2019-09-24 | Stop reason: HOSPADM

## 2019-09-23 RX ORDER — ACETAMINOPHEN 325 MG/1
650 TABLET ORAL EVERY 4 HOURS PRN
Status: ON HOLD | COMMUNITY
End: 2020-06-10

## 2019-09-23 RX ORDER — ANORECTAL OINTMENT 15.7; .44; 24; 20.6 G/100G; G/100G; G/100G; G/100G
OINTMENT TOPICAL 2 TIMES DAILY
COMMUNITY
Start: 2019-05-24 | End: 2020-02-29

## 2019-09-23 RX ADMIN — INSULIN GLARGINE 8 UNITS: 100 INJECTION, SOLUTION SUBCUTANEOUS at 21:06

## 2019-09-23 RX ADMIN — TAZOBACTAM SODIUM AND PIPERACILLIN SODIUM 3.38 G: 375; 3 INJECTION, SOLUTION INTRAVENOUS at 08:12

## 2019-09-23 RX ADMIN — INSULIN ASPART 6 UNITS: 100 INJECTION, SOLUTION INTRAVENOUS; SUBCUTANEOUS at 11:14

## 2019-09-23 RX ADMIN — INSULIN ASPART 8 UNITS: 100 INJECTION, SOLUTION INTRAVENOUS; SUBCUTANEOUS at 17:41

## 2019-09-23 RX ADMIN — Medication 1 CAPSULE: at 21:03

## 2019-09-23 RX ADMIN — LISINOPRIL 10 MG: 10 TABLET ORAL at 08:19

## 2019-09-23 RX ADMIN — MIRTAZAPINE 15 MG: 15 TABLET, FILM COATED ORAL at 21:03

## 2019-09-23 RX ADMIN — HYDROXYCHLOROQUINE SULFATE 200 MG: 200 TABLET, FILM COATED ORAL at 08:17

## 2019-09-23 RX ADMIN — METFORMIN HYDROCHLORIDE 500 MG: 500 TABLET, FILM COATED ORAL at 08:19

## 2019-09-23 RX ADMIN — TAZOBACTAM SODIUM AND PIPERACILLIN SODIUM 3.38 G: 375; 3 INJECTION, SOLUTION INTRAVENOUS at 01:49

## 2019-09-23 RX ADMIN — VANCOMYCIN HYDROCHLORIDE 1500 MG: 10 INJECTION, POWDER, LYOPHILIZED, FOR SOLUTION INTRAVENOUS at 11:13

## 2019-09-23 RX ADMIN — TAMSULOSIN HYDROCHLORIDE 0.4 MG: 0.4 CAPSULE ORAL at 17:44

## 2019-09-23 RX ADMIN — METFORMIN HYDROCHLORIDE 500 MG: 500 TABLET, FILM COATED ORAL at 17:44

## 2019-09-23 RX ADMIN — PREDNISONE 7.5 MG: 2.5 TABLET ORAL at 08:17

## 2019-09-23 RX ADMIN — Medication 1 CAPSULE: at 08:18

## 2019-09-23 RX ADMIN — TAZOBACTAM SODIUM AND PIPERACILLIN SODIUM 3.38 G: 375; 3 INJECTION, SOLUTION INTRAVENOUS at 20:14

## 2019-09-23 RX ADMIN — TAZOBACTAM SODIUM AND PIPERACILLIN SODIUM 3.38 G: 375; 3 INJECTION, SOLUTION INTRAVENOUS at 14:31

## 2019-09-23 RX ADMIN — INSULIN ASPART 2 UNITS: 100 INJECTION, SOLUTION INTRAVENOUS; SUBCUTANEOUS at 08:16

## 2019-09-23 ASSESSMENT — ACTIVITIES OF DAILY LIVING (ADL)
ADLS_ACUITY_SCORE: 27
ADLS_ACUITY_SCORE: 26
ADLS_ACUITY_SCORE: 26
ADLS_ACUITY_SCORE: 27

## 2019-09-23 NOTE — PROGRESS NOTES
Reason for Follow up: DC planning    Anticipated discharge needs: Pt not ready for discharge today per MD. Pt will return back to his LAANNA at [facility] (Main: [phone] Fax: [phone]) with [facility] (phone: [phone] Fax: [phone]).  This writer did speak with RN manager Addy at [phone] and updated her. Pts son also updated. Pt might be ready for discharge tomorrow.     long-term and family will take care of pts follow up appt with PCP.     Next steps: Return to long-term when stable    Discharge Planner   Discharge Plans in progress: [facility]  Barriers to discharge plan: medical stability  Follow up plan: CTS to follow       Entered by: Johanna Reynaga 09/23/2019 1:33 PM           Johanna LLANES, LICSW, [facility] [phone]

## 2019-09-23 NOTE — PHARMACY-VANCOMYCIN DOSING SERVICE
Pharmacy Vancomycin Note  Date of Service 2019  Patient's  [birth year]   85 year old, male    Indication: Sepsis and Urinary Tract Infection  Goal Trough Level: 15-20 mg/L  Day of Therapy: 2  Current Vancomycin regimen:  1500 mg IV q12h    Current estimated CrCl = Estimated Creatinine Clearance: 71.5 mL/min (based on SCr of 0.78 mg/dL).    Creatinine for last 3 days  2019:  5:20 PM Creatinine 0.90 mg/dL  2019:  5:33 AM Creatinine 0.78 mg/dL    Recent Vancomycin Levels (past 3 days)  2019:  9:26 PM Vancomycin Level 15.0 mg/L    Vancomycin IV Administrations (past 72 hours)                   vancomycin (VANCOCIN) 1,500 mg in sodium chloride 0.9 % 250 mL intermittent infusion (mg) 1,500 mg New Bag 19 1019     1,500 mg New Bag 19 2302                Nephrotoxins and other renal medications (From now, onward)    Start     Dose/Rate Route Frequency Ordered Stop    19 0800  lisinopril (PRINIVIL/ZESTRIL) tablet 10 mg      10 mg Oral EVERY MORNING 19 2105      19 0430  piperacillin-tazobactam (ZOSYN) infusion 3.375 g      3.375 g  100 mL/hr over 30 Minutes Intravenous EVERY 6 HOURS 19 2357      19 2221  vancomycin (VANCOCIN) 1,500 mg in sodium chloride 0.9 % 250 mL intermittent infusion      1,500 mg  over 90 Minutes Intravenous EVERY 12 HOURS 19 2220               Contrast Orders - past 72 hours (72h ago, onward)    None          Interpretation of levels and current regimen:  Trough level is  Therapeutic    Has serum creatinine changed > 50% in last 72 hours: No    Urine output:  good urine output    Renal Function: Stable    Plan:  1.  Continue Current Dose.  Level checked prior to steady state to ensure patient was not accumulating.  Will check level in 1-3 days if Vancomycin continues.  2.  Pharmacy will check trough levels as appropriate in 1-3 Days.    3. Serum creatinine levels will be ordered daily for the first week of therapy and at  least twice weekly for subsequent weeks.      Alysha Humphrey RPH        .

## 2019-09-23 NOTE — PROGRESS NOTES
[facility]    Hospitalist Progress Note    Date of Service (when I saw the patient): 09/23/2019    Assessment & Plan     Maxime Powell is a 85 year old male who was admitted on 9/21/2019 with fever.       UTI (urinary tract infection) due to indwelling catheter   Indwelling catheter due to prior treatment for prostate cancer   H/o prior urosepsis, most recently a couple of weeks ago due to Pseudomonas. UA abnormal 62 WBC and RBC > 182. WBC 11.0 with 91% neutrophils. High risk for progressive infection given history. Started on broad spectrum antibiotics with Zosyn and vancomycin.    - Continue Zosyn   - UC results growing Yeast  -  Discontinue Vanco   - Renal US to evaluate for kidney fungal infection (especially given poorly controlled diabetes)      Diabetes mellitus, type 2 (H), uncontrolled   BG running high. HgA1c 10.3 so poor control at baseline. Good renal function.    - high sliding scale insulin    - start Lantus 8 units at bedtime   - resumed metformin 9/22      ASCVD (arteriosclerotic cardiovascular disease)   RCA stent in 2001. Does not appear symptomatic.      H/o atrial fibrillation with RVR associated with episode of sepsis   Regular on exam. Not on rate control or anticoagulation.        Rheumatoid arthritis (H)   On plaquenil, low dose daily prednisone and Humira per med list. Immunocompromised especially from the Humira.   - continue RA treatment with Plaquenil and prednisone   - does not need stress dose steroid at this time      Dementia    On Remeron at HS, continue      DVT Prophylaxis: Low Risk/Ambulatory with no VTE prophylaxis indicated. Patient moving.  Code Status: DNR / DNI     Lines: PIV   Murcia catheter: Indwelling     Disposition: Anticipate discharge in 1-3 day(s) once UC returns and patient continues improvement. Appropriate for inpatient admission    Aung Rod  Text Page (7 am to 6 pm)    Interval History   Patient resting in bed.  He has no  acute complaints.    -Data reviewed today: I reviewed all new labs and imaging results over the last 24 hours. I personally reviewed no images or EKG's today.    Physical Exam   Temp: 98  F (36.7  C) Temp src: Oral BP: 133/63 Pulse: 70   Resp: 18 SpO2: 98 % O2 Device: None (Room air)    There were no vitals filed for this visit.  Vital Signs with Ranges  Temp:  [97.5  F (36.4  C)-98.7  F (37.1  C)] 98  F (36.7  C)  Pulse:  [64-82] 70  Resp:  [18-20] 18  BP: (123-150)/(58-64) 133/63  SpO2:  [95 %-100 %] 98 %  I/O last 3 completed shifts:  In: 3113 [P.O.:1720; I.V.:1393]  Out: 5200 [Urine:5200]    Gen: Well nourished, well developed elderly male, no acute distressed  HEENT: Atraumatic, normocephalic  Lungs: Clear to ausculation without wheezes, rhonchi, or rales  Heart: Regular rate and rhythm, no gallops or rubs, no murmurs  GI: Bowel sound normal, no hepatosplenomegaly or masses  Lymph: No lymphadenopathy or edema  Skin: No rashes     Medications     lactated ringers 1,000 mL (09/22/19 1906)       hydroxychloroquine  200 mg Oral QAM     insulin aspart  1-10 Units Subcutaneous TID AC     insulin aspart  1-7 Units Subcutaneous At Bedtime     insulin glargine  8 Units Subcutaneous At Bedtime     lactobacillus rhamnosus (GG)  1 capsule Oral BID     lisinopril  10 mg Oral QAM     metFORMIN  500 mg Oral BID w/meals     mirtazapine  15 mg Oral At Bedtime     piperacillin-tazobactam  3.375 g Intravenous Q6H     predniSONE  7.5 mg Oral Daily with breakfast     vancomycin (VANCOCIN) IV  1,500 mg Intravenous Q12H       Data   Recent Labs   Lab 09/22/19  0533 09/21/19  1720   WBC 9.4 11.0   HGB 9.0* 9.7*   MCV 90 90    295    134   POTASSIUM 3.3* 4.0   CHLORIDE 105 102   CO2 28 23   BUN 13 17   CR 0.78 0.90   ANIONGAP 6 9   LAISHA 8.1* 8.4*   * 318*   ALBUMIN  --  2.4*   PROTTOTAL  --  6.8   BILITOTAL  --  0.4   ALKPHOS  --  64   ALT  --  17   AST  --  12   LIPASE  --  280       No results found for this or any  previous visit (from the past 24 hour(s)).

## 2019-09-23 NOTE — PROGRESS NOTES
Pt slept all night. Earlier, pt c/o hip pain .  Pt had no tylenol so I rubbed some peppermint oil and pt said it helped and went back to sleep.  CANDIE Barrientos RN

## 2019-09-23 NOTE — PLAN OF CARE
Pt reports a decrease in pain of left flank. US completed. Pt pleasant. Will continue to assess and monitor.

## 2019-09-23 NOTE — PLAN OF CARE
Pt states having left flank pain. Very sore and painful with movement. Asked MD for pain medication order. Pt on IV abx. Murcia draining clear yellow urine. Questions answered. Pt was swearing this am, became much calmer and pleasant as the morning went on. Will continue to assess and monitor

## 2019-09-23 NOTE — PLAN OF CARE
Family called asking why Patient is having so many UTI's. Explained that martinez's cause infection. Is concerned that he has had prostate cancer in the past and that maybe the cancer has come back, causing his immune system to be compromised. Wanting note left for MD, note is in the sticky notes.

## 2019-09-24 VITALS
RESPIRATION RATE: 16 BRPM | SYSTOLIC BLOOD PRESSURE: 137 MMHG | DIASTOLIC BLOOD PRESSURE: 75 MMHG | HEIGHT: 74 IN | TEMPERATURE: 97.7 F | HEART RATE: 72 BPM | BODY MASS INDEX: 20.67 KG/M2 | OXYGEN SATURATION: 100 %

## 2019-09-24 LAB
ANION GAP SERPL CALCULATED.3IONS-SCNC: 7 MMOL/L (ref 3–14)
BACTERIA SPEC CULT: ABNORMAL
BUN SERPL-MCNC: 15 MG/DL (ref 7–30)
CALCIUM SERPL-MCNC: 8.2 MG/DL (ref 8.5–10.1)
CHLORIDE SERPL-SCNC: 107 MMOL/L (ref 94–109)
CO2 SERPL-SCNC: 27 MMOL/L (ref 20–32)
CREAT SERPL-MCNC: 1.04 MG/DL (ref 0.66–1.25)
ERYTHROCYTE [DISTWIDTH] IN BLOOD BY AUTOMATED COUNT: 15.7 % (ref 10–15)
GFR SERPL CREATININE-BSD FRML MDRD: 65 ML/MIN/{1.73_M2}
GLUCOSE BLDC GLUCOMTR-MCNC: 123 MG/DL (ref 70–99)
GLUCOSE BLDC GLUCOMTR-MCNC: 171 MG/DL (ref 70–99)
GLUCOSE SERPL-MCNC: 135 MG/DL (ref 70–99)
HCT VFR BLD AUTO: 25.8 % (ref 40–53)
HGB BLD-MCNC: 8.2 G/DL (ref 13.3–17.7)
Lab: ABNORMAL
MCH RBC QN AUTO: 28.6 PG (ref 26.5–33)
MCHC RBC AUTO-ENTMCNC: 31.8 G/DL (ref 31.5–36.5)
MCV RBC AUTO: 90 FL (ref 78–100)
PLATELET # BLD AUTO: 230 10E9/L (ref 150–450)
POTASSIUM SERPL-SCNC: 3.2 MMOL/L (ref 3.4–5.3)
RBC # BLD AUTO: 2.87 10E12/L (ref 4.4–5.9)
SODIUM SERPL-SCNC: 141 MMOL/L (ref 133–144)
SPECIMEN SOURCE: ABNORMAL
WBC # BLD AUTO: 5.8 10E9/L (ref 4–11)

## 2019-09-24 PROCEDURE — 80048 BASIC METABOLIC PNL TOTAL CA: CPT | Performed by: INTERNAL MEDICINE

## 2019-09-24 PROCEDURE — 25000132 ZZH RX MED GY IP 250 OP 250 PS 637: Performed by: INTERNAL MEDICINE

## 2019-09-24 PROCEDURE — 36415 COLL VENOUS BLD VENIPUNCTURE: CPT | Performed by: INTERNAL MEDICINE

## 2019-09-24 PROCEDURE — 85027 COMPLETE CBC AUTOMATED: CPT | Performed by: INTERNAL MEDICINE

## 2019-09-24 PROCEDURE — 00000146 ZZHCL STATISTIC GLUCOSE BY METER IP

## 2019-09-24 PROCEDURE — 25000131 ZZH RX MED GY IP 250 OP 636 PS 637: Performed by: INTERNAL MEDICINE

## 2019-09-24 PROCEDURE — 25000128 H RX IP 250 OP 636: Performed by: FAMILY MEDICINE

## 2019-09-24 PROCEDURE — 99239 HOSP IP/OBS DSCHRG MGMT >30: CPT | Performed by: INTERNAL MEDICINE

## 2019-09-24 RX ADMIN — Medication 1 CAPSULE: at 08:25

## 2019-09-24 RX ADMIN — TAZOBACTAM SODIUM AND PIPERACILLIN SODIUM 3.38 G: 375; 3 INJECTION, SOLUTION INTRAVENOUS at 01:55

## 2019-09-24 RX ADMIN — LISINOPRIL 10 MG: 10 TABLET ORAL at 08:26

## 2019-09-24 RX ADMIN — TAZOBACTAM SODIUM AND PIPERACILLIN SODIUM 3.38 G: 375; 3 INJECTION, SOLUTION INTRAVENOUS at 08:24

## 2019-09-24 RX ADMIN — PREDNISONE 7.5 MG: 2.5 TABLET ORAL at 08:26

## 2019-09-24 RX ADMIN — HYDROXYCHLOROQUINE SULFATE 200 MG: 200 TABLET, FILM COATED ORAL at 08:26

## 2019-09-24 RX ADMIN — TAMSULOSIN HYDROCHLORIDE 0.4 MG: 0.4 CAPSULE ORAL at 08:26

## 2019-09-24 RX ADMIN — METFORMIN HYDROCHLORIDE 500 MG: 500 TABLET, FILM COATED ORAL at 08:25

## 2019-09-24 ASSESSMENT — ACTIVITIES OF DAILY LIVING (ADL)
ADLS_ACUITY_SCORE: 27

## 2019-09-24 NOTE — PROGRESS NOTES
Name: Maxime Powell    MRN#: [id number]    Reason for Hospitalization: Elevated serum lactate dehydrogenase [R74.0]  UTI (urinary tract infection), bacterial [N39.0, A49.9]    Discharge Date: 9/24/2019    Patient / Family response to discharge plan: Pt discharged today back to his [facility], [facility] (Main: [phone] Fax: [phone])  Via son transport. This writer did speak with RN Manager and she is in agreement. Pt also went home with [facility] (phone: [phone] Fax: [phone]).    Other Providers (Care Coordinator, County Services, PCA services etc): No    CTS Hand Off Completed: Not needed-pt is [facility]    PAS #: n/a    J LUIS Score: elevated    Future Appointments: No future appointments. [facility] takes care of appts.    Discharge Disposition: assisted living    Discharge Planner   Discharge Plans in progress: [facility]  Barriers to discharge plan: none  Follow up plan: halfway with home care       Entered by: Johanna Reynaga 09/24/2019 11:30 AM           Johanna LLANES, LICSW, [phone]

## 2019-09-24 NOTE — PLAN OF CARE
Pt being d/c'd to [facility]. IV d/c'd. Murcia cath changed. After shower. Questions answered. Pt pleasantly confused. Redirected and follows instructions.

## 2019-09-24 NOTE — DISCHARGE SUMMARY
"Discharge Summary    Maxime Powell MRN# [id number]   YOB: 1933 Age: 85 year old     Date of Admission:  9/21/2019  Date of Discharge:  9/24/2019  Admitting Physician:  Ozzy James MD  Discharge Physician:  Aung Rod MD  Discharging Service:  Hospitalist     Primary Provider: [facility]          Admission Diagnoses:   Elevated serum lactate dehydrogenase [R74.0]  UTI (urinary tract infection), bacterial [N39.0, A49.9]          Discharge Diagnosis:   Patient Active Problem List   Diagnosis     Acute urinary retention     ASCVD (arteriosclerotic cardiovascular disease)     Coronary atherosclerosis     Cough     Diabetes mellitus, type 2 (H)     Elevated prostate specific antigen (PSA)     Hyperlipidemia     Hypertension     Lumbago     Migraine headache     Nocturia     Olecranon bursitis     Other symptoms involving digestive system(787.99)     Pain in joint, shoulder region     Prostate cancer (H)     Rheumatoid arthritis (H)     Osteoarthritis of glenohumeral joint     Aftercare following surgery of the musculoskeletal system     Urinary frequency     Septic shock due to urinary tract infection (H)     Urinary tract infection associated with indwelling urinary catheter (H)     Acute renal injury due to sepsis (H)     Immunosuppressed status (H)     Atrial fibrillation with rapid ventricular response (H)     Septic shock (H)     UTI (urinary tract infection)             Condition on Discharge:       Discharge vitals: Blood pressure 137/75, pulse 72, temperature 97.7  F (36.5  C), temperature source Oral, resp. rate 16, height 1.88 m (6' 2\"), SpO2 100 %.         Gen: Well nourished, debilitated, elderly male, no acute distressed  HEENT: Atraumatic, normocephalic  Lungs: Clear to ausculation without wheezes, rhonchi, or rales  Heart: Regular rate and rhythm, no gallops or rubs, no murmurs  GI: Bowel sound normal, no hepatosplenomegaly or masses  Lymph: No " lymphadenopathy or edema  Skin: No rashes          Procedures / Labs / Imaging:   Most Recent 3 CBC's:  Recent Labs   Lab Test 09/24/19  0511 09/22/19  0533 09/21/19  1720   WBC 5.8 9.4 11.0   HGB 8.2* 9.0* 9.7*   MCV 90 90 90    260 295      Most Recent 3 BMP's:  Recent Labs   Lab Test 09/24/19  0511 09/22/19  0533 09/21/19  1720    139 134   POTASSIUM 3.2* 3.3* 4.0   CHLORIDE 107 105 102   CO2 27 28 23   BUN 15 13 17   CR 1.04 0.78 0.90   ANIONGAP 7 6 9   LAISHA 8.2* 8.1* 8.4*   * 238* 318*     Most Recent 3 Troponin's:No lab results found.    Invalid input(s): TROP, TROPONINIES  Most Recent 3 INR's:No lab results found.  Most Recent 2 LFT's:  Recent Labs   Lab Test 09/21/19  1720 03/29/19  0548   AST 12 43  44   ALT 17 21  20   ALKPHOS 64 52  54   BILITOTAL 0.4 0.5  0.5     Most Recent Cholesterol Panel:No lab results found.  Most Recent 6 Bacteria Isolates From Any Culture (See EPIC Reports for Culture Details):  Recent Labs   Lab Test 09/21/19  1916 07/04/19  1607 03/27/19  1307 03/27/19  1245 03/27/19  1230 12/25/18  1659   CULT >100,000 colonies/mL  Candida albicans / dubliniensis  Candida albicans and Candida dubliniensis are not routinely speciated  Susceptibility testing not routinely done  * 10,000 to 50,000 colonies/mL  Enterococcus faecalis  *  10,000 to 50,000 colonies/mL  Streptococcus agalactiae sero group B  Susceptibility testing not routinely done on this organism from the genitourinary tract.   Our antibiogram indicates that Group B streptococci are susceptible to ampicillin,   penicillin, vancomycin and the cephalosporins. Susceptibility testing must be requested   within 5 days.  Group B Streptococcus may be significant in OB patients, however, it is part of the normal   urogenital andrés.  *  <10,000 colonies/mL  Staphylococcus aureus  * Cultured on the 1st day of incubation:  Klebsiella pneumoniae  *  Critical Value/Significant Value, preliminary result only, called to  and read back by  Taj Davis RN at [facility] at 0148 on 3.28.19 RD.    Susceptibility testing done on previous specimen >100,000 colonies/mL  Klebsiella pneumoniae  * Cultured on the 1st day of incubation:  Klebsiella pneumoniae  *  Critical Value/Significant Value, preliminary result only, called to and read back by  PANKAJ VARGAS RN @2301 3/27/19. SCG    (Note)  POSITIVE for KLEBSIELLA PNEUMONIAE by Rule.igene multiplex nucleic acid  test. Final identification and antimicrobial susceptibility testing  will be verified by standard methods.    Specimen tested with Verigene multiplex, gram-negative blood culture  nucleic acid test for the following targets: Acinetobacter sp.,  Citrobacter sp., Enterobacter sp., Proteus sp., E. coli, K.  pneumoniae/oxytoca, P. aeruginosa, and the following resistance  markers: CTXM, KPC, NDM, VIM, IMP and OXA.    Critical Value/Significant Value called to and read back by Pankaj Vargas RN on [facility] at 0128 on 3.28.19 RD.   >100,000 colonies/mL  Pseudomonas aeruginosa  *  >100,000 colonies/mL  Streptococcus agalactiae sero group B  Susceptibility testing not routinely done on this organism from the genitourinary tract.   Our antibiogram indicates that Group B streptococci are susceptible to ampicillin,   penicillin, vancomycin and the cephalosporins. Susceptibility testing must be requested   within 5 days.  Group B Streptococcus may be significant in OB patients, however, it is part of the normal   urogenital andrés.  *  >100,000 colonies/mL  Strain 2  Pseudomonas aeruginosa  *     Most Recent TSH, T4 and HgbA1c:   Recent Labs   Lab Test 09/22/19  0533 03/29/19  0548   TSH  --  1.57   A1C 10.3*  --      Results for orders placed or performed during the hospital encounter of 09/21/19   US Renal Complete    Narrative    US RENAL COMPLETE 9/23/2019 4:12 PM    HISTORY: Funguria.    COMPARISON: None.    FINDINGS: The right kidney measures 14.0 x 5.3 x 5.3 cm with a  cortical  thickness of 1.0 cm.  The left kidney measures 12.9 x 5.9 x  5.4cm with a cortical thickness of 1.1 cm. The kidneys show no  significant focal finding or hydronephrosis. The bladder is collapsed  around an indwelling Murcia catheter.      Impression    IMPRESSION: Negative renal ultrasound.    FLORENCE BAKER MD             Medications Prior to Admission:     Medications Prior to Admission   Medication Sig Dispense Refill Last Dose     acetaminophen (TYLENOL) 325 MG tablet Take 650 mg by mouth every 4 hours as needed for mild pain    9/21/2019 at 1509     adalimumab (HUMIRA) 40 MG/0.8ML prefilled syringe kit Inject 40 mg Subcutaneous every 14 days   9/18/2019     Artificial Saliva (BIOTENE MOISTURIZING MOUTH MT) Take 1 spray by mouth 2 times daily   9/21/2019 at 0800     blood glucose (WAVESENSE PRESTO) test strip by In Vitro route twice a week Use to test blood sugar every Monday and Thursday 9/16/2019 at 0800 #247     ferrous sulfate (FE TABS) 325 (65 Fe) MG EC tablet Take 325 mg by mouth daily Take with water and meal   9/21/2019 at 0800     hydroxychloroquine (PLAQUENIL) 200 MG tablet Take 1 tablet (200 mg) by mouth every morning   9/21/2019 at 0800     Lactobacillus Acid-Pectin (LACTOBACILLUS ACIDOPHILUS) TABS Take 1 tablet by mouth daily   9/21/2019 at 0800     lisinopril (PRINIVIL/ZESTRIL) 20 MG tablet Take 10 mg by mouth every morning    9/21/2019 at 0800     metFORMIN (GLUCOPHAGE) 500 MG tablet Take One tablet (500 mg) by mouth twice daily   9/21/2019 at 0800     methotrexate 2.5 MG tablet Take 10 mg by mouth once a week On Wednesday 9/18/2019 at 0800     mirtazapine (REMERON) 15 MG tablet Take 15 mg by mouth At Bedtime    9/20/2019 at 2100     Multiple Vitamins-Minerals (PRESERVISION AREDS PO) Take 1 capsule by mouth daily   9/4/2019 at unavailable     nitroFURantoin macrocrystal (MACRODANTIN) 100 MG capsule Take 100 mg by mouth daily    9/21/2019 at 0800     predniSONE (DELTASONE) 5 MG tablet Take 7.5 mg  by mouth daily (with breakfast) .   9/21/2019 at 0800     CALMOSEPTINE 0.44-20.6 % OINT ointment Apply topically 2 times daily as needed (rash) To Dot-Area   Unknown at Unknown time     tamsulosin (FLOMAX) 0.4 MG capsule Take 0.4 mg by mouth daily   9/21/2019 at 0800             Discharge Medications:     Current Discharge Medication List      START taking these medications    Details   amoxicillin-clavulanate (AUGMENTIN) 875-125 MG tablet Take 1 tablet by mouth 2 times daily  Qty: 9 tablet, Refills: 0    Associated Diagnoses: Urinary tract infection associated with catheterization of urinary tract, unspecified indwelling urinary catheter type, initial encounter (H)      insulin glargine (LANTUS PEN) 100 UNIT/ML pen Inject 8 Units Subcutaneous At Bedtime  Qty: 2.4 mL, Refills: 0    Comments: If Lantus is not covered by insurance, may substitute Basaglar at same dose and frequency.    Associated Diagnoses: Type 2 diabetes mellitus with complication, unspecified whether long term insulin use (H)         CONTINUE these medications which have NOT CHANGED    Details   acetaminophen (TYLENOL) 325 MG tablet Take 650 mg by mouth every 4 hours as needed for mild pain       adalimumab (HUMIRA) 40 MG/0.8ML prefilled syringe kit Inject 40 mg Subcutaneous every 14 days      Artificial Saliva (BIOTENE MOISTURIZING MOUTH MT) Take 1 spray by mouth 2 times daily      blood glucose (WAVESENSE PRESTO) test strip by In Vitro route twice a week Use to test blood sugar every Monday and Thursday      ferrous sulfate (FE TABS) 325 (65 Fe) MG EC tablet Take 325 mg by mouth daily Take with water and meal      hydroxychloroquine (PLAQUENIL) 200 MG tablet Take 1 tablet (200 mg) by mouth every morning    Comments: Hold this medicine until 4/5 and then restart  Associated Diagnoses: Rheumatoid arthritis, involving unspecified site, unspecified rheumatoid factor presence (H)      Lactobacillus Acid-Pectin (LACTOBACILLUS ACIDOPHILUS) TABS Take 1  tablet by mouth daily      lisinopril (PRINIVIL/ZESTRIL) 20 MG tablet Take 10 mg by mouth every morning       metFORMIN (GLUCOPHAGE) 500 MG tablet Take One tablet (500 mg) by mouth twice daily      methotrexate 2.5 MG tablet Take 10 mg by mouth once a week On Wednesday      mirtazapine (REMERON) 15 MG tablet Take 15 mg by mouth At Bedtime       Multiple Vitamins-Minerals (PRESERVISION AREDS PO) Take 1 capsule by mouth daily      nitroFURantoin macrocrystal (MACRODANTIN) 100 MG capsule Take 100 mg by mouth daily       predniSONE (DELTASONE) 5 MG tablet Take 7.5 mg by mouth daily (with breakfast) .      CALMOSEPTINE 0.44-20.6 % OINT ointment Apply topically 2 times daily as needed (rash) To Dot-Area      tamsulosin (FLOMAX) 0.4 MG capsule Take 0.4 mg by mouth daily                   Brief History of Illness:   Maxime Powell is a 85 year old male who was admitted for fever.          Hospital Course:   Patient was admitted to the hospital and started on Zosyn and vancomycin.  Hemoglobin A1c was 10.3.  Urine cultures grew Candida albicans, which was likely a contaminant or colonizer.  Urine cultures were otherwise negative.  Murcia catheter was exchanged.  The patient was afebrile for 24 hours on the day of discharge.  He was discharged on a course of Augmentin as well as Lantus 8 units at bedtime.    Greater than 35 minutes were spent in discharge planning.             Pending Results:   Unresulted Labs Ordered in the Past 30 Days of this Admission     No orders found from 8/22/2019 to 9/22/2019.

## 2019-09-24 NOTE — PLAN OF CARE
GUERLINE ARORAG DISCHARGE NOTE    Patient discharged to [facility] at  AM via ambulation. Accompanied by son and staff. Discharge instructions reviewed with patient and son, opportunity offered to ask questions. Prescriptions sent to patients care facility. All belongings sent with patient.    Vera Hong RN

## 2019-10-04 ENCOUNTER — RECORDS - HEALTHEAST (OUTPATIENT)
Dept: LAB | Facility: CLINIC | Age: 84
End: 2019-10-04

## 2019-10-04 LAB
ANION GAP SERPL CALCULATED.3IONS-SCNC: 8 MMOL/L (ref 5–18)
BUN SERPL-MCNC: 12 MG/DL (ref 8–28)
CALCIUM SERPL-MCNC: 9 MG/DL (ref 8.5–10.5)
CHLORIDE BLD-SCNC: 102 MMOL/L (ref 98–107)
CO2 SERPL-SCNC: 29 MMOL/L (ref 22–31)
CREAT SERPL-MCNC: 0.91 MG/DL (ref 0.7–1.3)
ERYTHROCYTE [DISTWIDTH] IN BLOOD BY AUTOMATED COUNT: 16.5 % (ref 11–14.5)
GFR SERPL CREATININE-BSD FRML MDRD: >60 ML/MIN/1.73M2
GLUCOSE BLD-MCNC: 133 MG/DL (ref 70–125)
HCT VFR BLD AUTO: 31.9 % (ref 40–54)
HGB BLD-MCNC: 10.1 G/DL (ref 14–18)
MCH RBC QN AUTO: 28.9 PG (ref 27–34)
MCHC RBC AUTO-ENTMCNC: 31.7 G/DL (ref 32–36)
MCV RBC AUTO: 91 FL (ref 80–100)
PLATELET # BLD AUTO: 208 THOU/UL (ref 140–440)
PMV BLD AUTO: 10.1 FL (ref 8.5–12.5)
POTASSIUM BLD-SCNC: 3.6 MMOL/L (ref 3.5–5)
RBC # BLD AUTO: 3.49 MILL/UL (ref 4.4–6.2)
SODIUM SERPL-SCNC: 139 MMOL/L (ref 136–145)
WBC: 7 THOU/UL (ref 4–11)

## 2019-10-06 LAB — HBA1C MFR BLD: 10 % (ref 4.2–6.1)

## 2019-11-14 ENCOUNTER — RECORDS - HEALTHEAST (OUTPATIENT)
Dept: LAB | Facility: CLINIC | Age: 84
End: 2019-11-14

## 2019-11-14 LAB
ALBUMIN SERPL-MCNC: 3.3 G/DL (ref 3.5–5)
ALP SERPL-CCNC: 76 U/L (ref 45–120)
ALT SERPL W P-5'-P-CCNC: 10 U/L (ref 0–45)
AST SERPL W P-5'-P-CCNC: 16 U/L (ref 0–40)
BASOPHILS # BLD AUTO: 0 THOU/UL (ref 0–0.2)
BASOPHILS NFR BLD AUTO: 1 % (ref 0–2)
BILIRUB SERPL-MCNC: 0.4 MG/DL (ref 0–1)
BUN SERPL-MCNC: 15 MG/DL (ref 8–28)
C REACTIVE PROTEIN LHE: 0.4 MG/DL (ref 0–0.8)
CREAT SERPL-MCNC: 0.89 MG/DL (ref 0.7–1.3)
EOSINOPHIL # BLD AUTO: 0.1 THOU/UL (ref 0–0.4)
EOSINOPHIL NFR BLD AUTO: 2 % (ref 0–6)
ERYTHROCYTE [DISTWIDTH] IN BLOOD BY AUTOMATED COUNT: 17.1 % (ref 11–14.5)
ERYTHROCYTE [SEDIMENTATION RATE] IN BLOOD BY WESTERGREN METHOD: 77 MM/HR (ref 0–15)
GFR SERPL CREATININE-BSD FRML MDRD: >60 ML/MIN/1.73M2
HCT VFR BLD AUTO: 34.6 % (ref 40–54)
HGB BLD-MCNC: 11.1 G/DL (ref 14–18)
LYMPHOCYTES # BLD AUTO: 1.1 THOU/UL (ref 0.8–4.4)
LYMPHOCYTES NFR BLD AUTO: 15 % (ref 20–40)
MCH RBC QN AUTO: 31.4 PG (ref 27–34)
MCHC RBC AUTO-ENTMCNC: 32.1 G/DL (ref 32–36)
MCV RBC AUTO: 98 FL (ref 80–100)
MONOCYTES # BLD AUTO: 0.4 THOU/UL (ref 0–0.9)
MONOCYTES NFR BLD AUTO: 6 % (ref 2–10)
NEUTROPHILS # BLD AUTO: 5.2 THOU/UL (ref 2–7.7)
NEUTROPHILS NFR BLD AUTO: 76 % (ref 50–70)
PLATELET # BLD AUTO: 193 THOU/UL (ref 140–440)
PMV BLD AUTO: 10.3 FL (ref 8.5–12.5)
RBC # BLD AUTO: 3.53 MILL/UL (ref 4.4–6.2)
WBC: 6.9 THOU/UL (ref 4–11)

## 2020-01-15 ENCOUNTER — RECORDS - HEALTHEAST (OUTPATIENT)
Dept: LAB | Facility: CLINIC | Age: 85
End: 2020-01-15

## 2020-01-15 LAB
ALBUMIN UR-MCNC: ABNORMAL MG/DL
AMORPH CRY #/AREA URNS HPF: ABNORMAL /[HPF]
APPEARANCE UR: ABNORMAL
BACTERIA #/AREA URNS HPF: ABNORMAL HPF
BILIRUB UR QL STRIP: NEGATIVE
COLOR UR AUTO: YELLOW
GLUCOSE UR STRIP-MCNC: NEGATIVE MG/DL
HGB UR QL STRIP: ABNORMAL
KETONES UR STRIP-MCNC: NEGATIVE MG/DL
LEUKOCYTE ESTERASE UR QL STRIP: ABNORMAL
NITRATE UR QL: NEGATIVE
PH UR STRIP: 6.5 [PH] (ref 4.5–8)
RBC #/AREA URNS AUTO: ABNORMAL HPF
SP GR UR STRIP: 1.01 (ref 1–1.03)
SQUAMOUS #/AREA URNS AUTO: ABNORMAL LPF
UROBILINOGEN UR STRIP-ACNC: ABNORMAL
WBC #/AREA URNS AUTO: >100 HPF
WBC CLUMPS #/AREA URNS HPF: PRESENT /[HPF]
YEAST #/AREA URNS HPF: ABNORMAL HPF

## 2020-01-17 LAB — BACTERIA SPEC CULT: ABNORMAL

## 2020-01-22 ENCOUNTER — RECORDS - HEALTHEAST (OUTPATIENT)
Dept: LAB | Facility: CLINIC | Age: 85
End: 2020-01-22

## 2020-01-22 LAB
ANION GAP SERPL CALCULATED.3IONS-SCNC: 8 MMOL/L (ref 5–18)
BUN SERPL-MCNC: 18 MG/DL (ref 8–28)
CALCIUM SERPL-MCNC: 9.4 MG/DL (ref 8.5–10.5)
CHLORIDE BLD-SCNC: 105 MMOL/L (ref 98–107)
CO2 SERPL-SCNC: 28 MMOL/L (ref 22–31)
CREAT SERPL-MCNC: 1.01 MG/DL (ref 0.7–1.3)
ERYTHROCYTE [DISTWIDTH] IN BLOOD BY AUTOMATED COUNT: 14.8 % (ref 11–14.5)
GFR SERPL CREATININE-BSD FRML MDRD: >60 ML/MIN/1.73M2
GLUCOSE BLD-MCNC: 100 MG/DL (ref 70–125)
HCT VFR BLD AUTO: 36.1 % (ref 40–54)
HGB BLD-MCNC: 11.5 G/DL (ref 14–18)
MCH RBC QN AUTO: 32.1 PG (ref 27–34)
MCHC RBC AUTO-ENTMCNC: 31.9 G/DL (ref 32–36)
MCV RBC AUTO: 101 FL (ref 80–100)
PLATELET # BLD AUTO: 167 THOU/UL (ref 140–440)
PMV BLD AUTO: 10.6 FL (ref 8.5–12.5)
POTASSIUM BLD-SCNC: 4 MMOL/L (ref 3.5–5)
RBC # BLD AUTO: 3.58 MILL/UL (ref 4.4–6.2)
SODIUM SERPL-SCNC: 141 MMOL/L (ref 136–145)
TSH SERPL DL<=0.005 MIU/L-ACNC: 2 UIU/ML (ref 0.3–5)
VIT B12 SERPL-MCNC: 460 PG/ML (ref 213–816)
WBC: 5 THOU/UL (ref 4–11)

## 2020-01-23 LAB — HBA1C MFR BLD: 7.6 % (ref 4.2–6.1)

## 2020-02-04 ENCOUNTER — RECORDS - HEALTHEAST (OUTPATIENT)
Dept: LAB | Facility: CLINIC | Age: 85
End: 2020-02-04

## 2020-02-04 LAB
ALBUMIN UR-MCNC: ABNORMAL MG/DL
APPEARANCE UR: ABNORMAL
BACTERIA #/AREA URNS HPF: ABNORMAL HPF
BILIRUB UR QL STRIP: NEGATIVE
COLOR UR AUTO: YELLOW
GLUCOSE UR STRIP-MCNC: NEGATIVE MG/DL
HGB UR QL STRIP: ABNORMAL
KETONES UR STRIP-MCNC: NEGATIVE MG/DL
LEUKOCYTE ESTERASE UR QL STRIP: ABNORMAL
MUCOUS THREADS #/AREA URNS LPF: ABNORMAL LPF
NITRATE UR QL: POSITIVE
PH UR STRIP: 6 [PH] (ref 4.5–8)
RBC #/AREA URNS AUTO: ABNORMAL HPF
SP GR UR STRIP: 1.01 (ref 1–1.03)
SQUAMOUS #/AREA URNS AUTO: ABNORMAL LPF
UROBILINOGEN UR STRIP-ACNC: ABNORMAL
WBC #/AREA URNS AUTO: >100 HPF
WBC CLUMPS #/AREA URNS HPF: PRESENT /[HPF]

## 2020-02-07 LAB
BACTERIA SPEC CULT: ABNORMAL
BACTERIA SPEC CULT: ABNORMAL

## 2020-02-13 ENCOUNTER — RECORDS - HEALTHEAST (OUTPATIENT)
Dept: LAB | Facility: CLINIC | Age: 85
End: 2020-02-13

## 2020-02-13 LAB
ALBUMIN SERPL-MCNC: 3.3 G/DL (ref 3.5–5)
ALP SERPL-CCNC: 75 U/L (ref 45–120)
ALT SERPL W P-5'-P-CCNC: 11 U/L (ref 0–45)
AST SERPL W P-5'-P-CCNC: 17 U/L (ref 0–40)
BASOPHILS # BLD AUTO: 0 THOU/UL (ref 0–0.2)
BASOPHILS NFR BLD AUTO: 1 % (ref 0–2)
BILIRUB SERPL-MCNC: 0.4 MG/DL (ref 0–1)
BUN SERPL-MCNC: 22 MG/DL (ref 8–28)
C REACTIVE PROTEIN LHE: 0.9 MG/DL (ref 0–0.8)
CREAT SERPL-MCNC: 0.92 MG/DL (ref 0.7–1.3)
EOSINOPHIL # BLD AUTO: 0.1 THOU/UL (ref 0–0.4)
EOSINOPHIL NFR BLD AUTO: 1 % (ref 0–6)
ERYTHROCYTE [DISTWIDTH] IN BLOOD BY AUTOMATED COUNT: 14.4 % (ref 11–14.5)
ERYTHROCYTE [SEDIMENTATION RATE] IN BLOOD BY WESTERGREN METHOD: 35 MM/HR (ref 0–15)
GFR SERPL CREATININE-BSD FRML MDRD: >60 ML/MIN/1.73M2
HCT VFR BLD AUTO: 36.3 % (ref 40–54)
HGB BLD-MCNC: 11.7 G/DL (ref 14–18)
LYMPHOCYTES # BLD AUTO: 1.1 THOU/UL (ref 0.8–4.4)
LYMPHOCYTES NFR BLD AUTO: 18 % (ref 20–40)
MCH RBC QN AUTO: 32.7 PG (ref 27–34)
MCHC RBC AUTO-ENTMCNC: 32.2 G/DL (ref 32–36)
MCV RBC AUTO: 101 FL (ref 80–100)
MONOCYTES # BLD AUTO: 0.5 THOU/UL (ref 0–0.9)
MONOCYTES NFR BLD AUTO: 7 % (ref 2–10)
NEUTROPHILS # BLD AUTO: 4.8 THOU/UL (ref 2–7.7)
NEUTROPHILS NFR BLD AUTO: 73 % (ref 50–70)
PLATELET # BLD AUTO: 160 THOU/UL (ref 140–440)
PMV BLD AUTO: 10.5 FL (ref 8.5–12.5)
RBC # BLD AUTO: 3.58 MILL/UL (ref 4.4–6.2)
WBC: 6.5 THOU/UL (ref 4–11)

## 2020-02-26 ENCOUNTER — RECORDS - HEALTHEAST (OUTPATIENT)
Dept: LAB | Facility: CLINIC | Age: 85
End: 2020-02-26

## 2020-02-26 LAB — HIV 1+2 AB+HIV1 P24 AG SERPL QL IA: NEGATIVE

## 2020-02-27 LAB
HBV SURFACE AG SERPL QL IA: NEGATIVE
HCV AB SERPL QL IA: NEGATIVE

## 2020-02-29 ENCOUNTER — APPOINTMENT (OUTPATIENT)
Dept: CT IMAGING | Facility: CLINIC | Age: 85
End: 2020-02-29
Attending: EMERGENCY MEDICINE
Payer: MEDICARE

## 2020-02-29 ENCOUNTER — HOSPITAL ENCOUNTER (EMERGENCY)
Facility: CLINIC | Age: 85
Discharge: HOME | End: 2020-02-29
Attending: EMERGENCY MEDICINE | Admitting: EMERGENCY MEDICINE
Payer: MEDICARE

## 2020-02-29 VITALS
SYSTOLIC BLOOD PRESSURE: 163 MMHG | HEART RATE: 65 BPM | OXYGEN SATURATION: 97 % | TEMPERATURE: 98.2 F | RESPIRATION RATE: 22 BRPM | DIASTOLIC BLOOD PRESSURE: 112 MMHG

## 2020-02-29 DIAGNOSIS — T83.511A URINARY TRACT INFECTION ASSOCIATED WITH INDWELLING URETHRAL CATHETER, INITIAL ENCOUNTER (H): ICD-10-CM

## 2020-02-29 DIAGNOSIS — N39.0 URINARY TRACT INFECTION ASSOCIATED WITH INDWELLING URETHRAL CATHETER, INITIAL ENCOUNTER (H): ICD-10-CM

## 2020-02-29 DIAGNOSIS — R10.9 ABDOMINAL PAIN, UNSPECIFIED ABDOMINAL LOCATION: ICD-10-CM

## 2020-02-29 DIAGNOSIS — F03.90 DEMENTIA WITHOUT BEHAVIORAL DISTURBANCE, UNSPECIFIED DEMENTIA TYPE: ICD-10-CM

## 2020-02-29 DIAGNOSIS — I10 HYPERTENSION, UNSPECIFIED TYPE: ICD-10-CM

## 2020-02-29 LAB
ALBUMIN SERPL-MCNC: 3 G/DL (ref 3.4–5)
ALBUMIN UR-MCNC: NEGATIVE MG/DL
ALBUMIN UR-MCNC: NEGATIVE MG/DL
ALP SERPL-CCNC: 78 U/L (ref 40–150)
ALT SERPL W P-5'-P-CCNC: 18 U/L (ref 0–70)
AMORPH CRY #/AREA URNS HPF: ABNORMAL /HPF
ANION GAP SERPL CALCULATED.3IONS-SCNC: 5 MMOL/L (ref 3–14)
APPEARANCE UR: ABNORMAL
APPEARANCE UR: CLEAR
AST SERPL W P-5'-P-CCNC: 18 U/L (ref 0–45)
BACTERIA #/AREA URNS HPF: ABNORMAL /HPF
BASOPHILS # BLD AUTO: 0 10E9/L (ref 0–0.2)
BASOPHILS NFR BLD AUTO: 0.7 %
BILIRUB SERPL-MCNC: 0.5 MG/DL (ref 0.2–1.3)
BILIRUB UR QL STRIP: NEGATIVE
BILIRUB UR QL STRIP: NEGATIVE
BUN SERPL-MCNC: 17 MG/DL (ref 7–30)
CALCIUM SERPL-MCNC: 8.7 MG/DL (ref 8.5–10.1)
CHLORIDE SERPL-SCNC: 108 MMOL/L (ref 94–109)
CO2 SERPL-SCNC: 28 MMOL/L (ref 20–32)
COLOR UR AUTO: ABNORMAL
COLOR UR AUTO: YELLOW
CREAT SERPL-MCNC: 0.8 MG/DL (ref 0.66–1.25)
DIFFERENTIAL METHOD BLD: ABNORMAL
EOSINOPHIL # BLD AUTO: 0.2 10E9/L (ref 0–0.7)
EOSINOPHIL NFR BLD AUTO: 2.8 %
ERYTHROCYTE [DISTWIDTH] IN BLOOD BY AUTOMATED COUNT: 13.2 % (ref 10–15)
GFR SERPL CREATININE-BSD FRML MDRD: 81 ML/MIN/{1.73_M2}
GLUCOSE SERPL-MCNC: 135 MG/DL (ref 70–99)
GLUCOSE UR STRIP-MCNC: NEGATIVE MG/DL
GLUCOSE UR STRIP-MCNC: NEGATIVE MG/DL
HCT VFR BLD AUTO: 35.5 % (ref 40–53)
HGB BLD-MCNC: 11.9 G/DL (ref 13.3–17.7)
HGB UR QL STRIP: ABNORMAL
HGB UR QL STRIP: ABNORMAL
IMM GRANULOCYTES # BLD: 0 10E9/L (ref 0–0.4)
IMM GRANULOCYTES NFR BLD: 0.4 %
KETONES UR STRIP-MCNC: NEGATIVE MG/DL
KETONES UR STRIP-MCNC: NEGATIVE MG/DL
LACTATE BLD-SCNC: 1 MMOL/L (ref 0.7–2)
LEUKOCYTE ESTERASE UR QL STRIP: ABNORMAL
LEUKOCYTE ESTERASE UR QL STRIP: ABNORMAL
LIPASE SERPL-CCNC: 146 U/L (ref 73–393)
LYMPHOCYTES # BLD AUTO: 0.9 10E9/L (ref 0.8–5.3)
LYMPHOCYTES NFR BLD AUTO: 17 %
MCH RBC QN AUTO: 32.2 PG (ref 26.5–33)
MCHC RBC AUTO-ENTMCNC: 33.5 G/DL (ref 31.5–36.5)
MCV RBC AUTO: 96 FL (ref 78–100)
MONOCYTES # BLD AUTO: 0.6 10E9/L (ref 0–1.3)
MONOCYTES NFR BLD AUTO: 10.8 %
MUCOUS THREADS #/AREA URNS LPF: PRESENT /LPF
NEUTROPHILS # BLD AUTO: 3.7 10E9/L (ref 1.6–8.3)
NEUTROPHILS NFR BLD AUTO: 68.3 %
NITRATE UR QL: NEGATIVE
NITRATE UR QL: NEGATIVE
NRBC # BLD AUTO: 0 10*3/UL
NRBC BLD AUTO-RTO: 0 /100
PH UR STRIP: 8 PH (ref 5–7)
PH UR STRIP: 8 PH (ref 5–7)
PLATELET # BLD AUTO: 152 10E9/L (ref 150–450)
POTASSIUM SERPL-SCNC: 3.7 MMOL/L (ref 3.4–5.3)
PROT SERPL-MCNC: 7 G/DL (ref 6.8–8.8)
RBC # BLD AUTO: 3.69 10E12/L (ref 4.4–5.9)
RBC #/AREA URNS AUTO: 13 /HPF (ref 0–2)
RBC #/AREA URNS AUTO: 47 /HPF (ref 0–2)
SODIUM SERPL-SCNC: 141 MMOL/L (ref 133–144)
SOURCE: ABNORMAL
SOURCE: ABNORMAL
SP GR UR STRIP: 1.01 (ref 1–1.03)
SP GR UR STRIP: 1.02 (ref 1–1.03)
SQUAMOUS #/AREA URNS AUTO: <1 /HPF (ref 0–1)
UROBILINOGEN UR STRIP-MCNC: 0 MG/DL (ref 0–2)
UROBILINOGEN UR STRIP-MCNC: 0 MG/DL (ref 0–2)
WBC # BLD AUTO: 5.4 10E9/L (ref 4–11)
WBC #/AREA URNS AUTO: 27 /HPF (ref 0–5)
WBC #/AREA URNS AUTO: 46 /HPF (ref 0–5)

## 2020-02-29 PROCEDURE — 81001 URINALYSIS AUTO W/SCOPE: CPT | Performed by: FAMILY MEDICINE

## 2020-02-29 PROCEDURE — 25000125 ZZHC RX 250: Performed by: EMERGENCY MEDICINE

## 2020-02-29 PROCEDURE — 25000128 H RX IP 250 OP 636: Performed by: EMERGENCY MEDICINE

## 2020-02-29 PROCEDURE — 81001 URINALYSIS AUTO W/SCOPE: CPT | Mod: 91 | Performed by: EMERGENCY MEDICINE

## 2020-02-29 PROCEDURE — 51702 INSERT TEMP BLADDER CATH: CPT | Performed by: EMERGENCY MEDICINE

## 2020-02-29 PROCEDURE — 96360 HYDRATION IV INFUSION INIT: CPT | Mod: 59 | Performed by: EMERGENCY MEDICINE

## 2020-02-29 PROCEDURE — 83690 ASSAY OF LIPASE: CPT | Performed by: FAMILY MEDICINE

## 2020-02-29 PROCEDURE — 99285 EMERGENCY DEPT VISIT HI MDM: CPT | Mod: Z6 | Performed by: EMERGENCY MEDICINE

## 2020-02-29 PROCEDURE — 83605 ASSAY OF LACTIC ACID: CPT | Performed by: FAMILY MEDICINE

## 2020-02-29 PROCEDURE — 99285 EMERGENCY DEPT VISIT HI MDM: CPT | Mod: 25 | Performed by: EMERGENCY MEDICINE

## 2020-02-29 PROCEDURE — 85025 COMPLETE CBC W/AUTO DIFF WBC: CPT | Performed by: FAMILY MEDICINE

## 2020-02-29 PROCEDURE — 96361 HYDRATE IV INFUSION ADD-ON: CPT | Performed by: EMERGENCY MEDICINE

## 2020-02-29 PROCEDURE — 74177 CT ABD & PELVIS W/CONTRAST: CPT

## 2020-02-29 PROCEDURE — 25800030 ZZH RX IP 258 OP 636: Performed by: EMERGENCY MEDICINE

## 2020-02-29 PROCEDURE — 25000132 ZZH RX MED GY IP 250 OP 250 PS 637: Mod: GY | Performed by: EMERGENCY MEDICINE

## 2020-02-29 PROCEDURE — 80053 COMPREHEN METABOLIC PANEL: CPT | Performed by: FAMILY MEDICINE

## 2020-02-29 PROCEDURE — 87086 URINE CULTURE/COLONY COUNT: CPT | Performed by: EMERGENCY MEDICINE

## 2020-02-29 RX ORDER — CEPHALEXIN 500 MG/1
500 CAPSULE ORAL ONCE
Status: COMPLETED | OUTPATIENT
Start: 2020-02-29 | End: 2020-02-29

## 2020-02-29 RX ORDER — IOPAMIDOL 755 MG/ML
79 INJECTION, SOLUTION INTRAVASCULAR ONCE
Status: COMPLETED | OUTPATIENT
Start: 2020-02-29 | End: 2020-02-29

## 2020-02-29 RX ORDER — NYSTATIN 100000 [USP'U]/G
POWDER TOPICAL 2 TIMES DAILY
COMMUNITY

## 2020-02-29 RX ORDER — SODIUM CHLORIDE 9 MG/ML
1000 INJECTION, SOLUTION INTRAVENOUS CONTINUOUS
Status: DISCONTINUED | OUTPATIENT
Start: 2020-02-29 | End: 2020-02-29 | Stop reason: HOSPADM

## 2020-02-29 RX ORDER — CEPHALEXIN 500 MG/1
500 CAPSULE ORAL 3 TIMES DAILY
Qty: 21 CAPSULE | Refills: 0 | Status: ON HOLD | OUTPATIENT
Start: 2020-02-29 | End: 2020-06-08

## 2020-02-29 RX ADMIN — SODIUM CHLORIDE 1000 ML: 9 INJECTION, SOLUTION INTRAVENOUS at 12:28

## 2020-02-29 RX ADMIN — CEPHALEXIN 500 MG: 500 CAPSULE ORAL at 14:11

## 2020-02-29 RX ADMIN — IOPAMIDOL 79 ML: 755 INJECTION, SOLUTION INTRAVENOUS at 13:15

## 2020-02-29 RX ADMIN — SODIUM CHLORIDE 60 ML: 9 INJECTION, SOLUTION INTRAVENOUS at 13:15

## 2020-02-29 NOTE — ED PROVIDER NOTES
History     Chief Complaint   Patient presents with     Abdominal Pain     discharge from martinez catheter     HPI  Maxime Powell is a 86 year old male with history of coronary atherosclerosis, type 2 diabetes mellitus, ASCVD, hyperlipidemia, hypertension, prostate cancer, rheumatoid arthritis, immunosuppressed status, atrial fibrillation with rapid ventricular response, and septic shock. who presents to the emergency department with abdominal pain. History difficult to obtain from the patient.  Patient does have known history of dementia.  History obtained primarily from chart review, in addition to EMS discussion.  Patient is unable to state the current year. Patient resides in a nursing home in [place]. Patient reports right sided abdominal pain. Discharge observed from martinez catheter.  Patient does state that he has had some upper abdominal pains.    After nurse discussion with patient's son, does state that has had increased amounts of generalized fatigue, and there was also concern regarding drainage around the Martinez catheter site.  Son also reported that patient had sepsis on a prior occasion when he had similar types of symptoms.    Allergies:  Allergies   Allergen Reactions     Finasteride Other (See Comments)     Gynecomastia, Required tamoxifen treatment     Penicillins      Other reaction(s): Edema, Erythema     Gold Rash     Injectable gold       Problem List:    Patient Active Problem List    Diagnosis Date Noted     UTI (urinary tract infection) 09/21/2019     Priority: Medium     Septic shock due to urinary tract infection (H) 03/27/2019     Priority: Medium     Urinary tract infection associated with indwelling urinary catheter (H) 03/27/2019     Priority: Medium     Acute renal injury due to sepsis (H) 03/27/2019     Priority: Medium     Immunosuppressed status (H) 03/27/2019     Priority: Medium     Atrial fibrillation with rapid ventricular response (H) 03/27/2019     Priority: Medium      Septic shock (H) 03/27/2019     Priority: Medium     Coronary atherosclerosis 01/31/2018     Priority: Medium     Cough 01/31/2018     Priority: Medium     Elevated prostate specific antigen (PSA) 01/31/2018     Priority: Medium     Lumbago 01/31/2018     Priority: Medium     Migraine headache 01/31/2018     Priority: Medium     Nocturia 01/31/2018     Priority: Medium     Olecranon bursitis 01/31/2018     Priority: Medium     Other symptoms involving digestive system(787.99) 01/31/2018     Priority: Medium     Pain in joint, shoulder region 01/31/2018     Priority: Medium     Urinary frequency 01/31/2018     Priority: Medium     Aftercare following surgery of the musculoskeletal system 11/04/2014     Priority: Medium     Acute urinary retention 05/07/2014     Priority: Medium     ASCVD (arteriosclerotic cardiovascular disease) 05/07/2014     Priority: Medium     Overview:   S/p RCA stenting 2001       Diabetes mellitus, type 2 (H) 05/07/2014     Priority: Medium     Hyperlipidemia 05/07/2014     Priority: Medium     Hypertension 05/07/2014     Priority: Medium     Rheumatoid arthritis (H) 05/07/2014     Priority: Medium     Osteoarthritis of glenohumeral joint 02/13/2014     Priority: Medium     Prostate cancer (H) 08/27/2013     Priority: Medium     Overview:   S/p radition tx 1987 in [place]           Past Medical History:    Past Medical History:   Diagnosis Date     Atherosclerotic heart disease of native coronary artery without angina pectoris      Essential (primary) hypertension      Hyperlipidemia      Mononeuropathy of left lower extremity      Osteoarthritis      Personal history of malignant neoplasm of prostate      RA (rheumatoid arthritis) (H)      Rheumatoid arthritis (H)      Sjogren-Ashwin syndrome      Strain of muscle, fascia and tendon of long head of biceps, unspecified arm, initial encounter      Type 2 diabetes mellitus without complications (H)        Past Surgical History:    Past Surgical  History:   Procedure Laterality Date     APPENDECTOMY OPEN      1960     ARTHROSCOPY SHOULDER ROTATOR CUFF REPAIR      2002,right, Jim Chanel MD     ARTHROTOMY WRIST      1986     COLONOSCOPY      2014     HEART CATH, ANGIOPLASTY      2001,right coronary artery     OTHER SURGICAL HISTORY      2014,[id number],SHOULDER REPLACEMENT,Right,reverse     OTHER SURGICAL HISTORY      15716.0,NM BONE SCAN 3 PHASE,13 negative for  metastases     OTHER SURGICAL HISTORY      061957,OTHER,no evidence of metastases     OTHER SURGICAL HISTORY      025746,OTHER     OTHER SURGICAL HISTORY      869253,OTHER,right       Family History:    Family History   Problem Relation Age of Onset     Cancer Father         Cancer,throat cancer     Breast Cancer Sister         Cancer-breast       Social History:  Marital Status:   [5]  Social History     Tobacco Use     Smoking status: Former Smoker     Years: 30.00     Types: Cigarettes     Last attempt to quit: 1980     Years since quittin.1     Smokeless tobacco: Former User     Types: Chew     Quit date: 2014     Tobacco comment: Quit smoking: quit smoking 33 years ago. Chew's daily   Substance Use Topics     Alcohol use: No     Drug use: No     Types: Other     Comment: Drug use: No        Medications:    blood glucose (WAVESENSE PRESTO) test strip  cephALEXin (KEFLEX) 500 MG capsule  predniSONE (DELTASONE) 5 MG tablet  tamsulosin (FLOMAX) 0.4 MG capsule  acetaminophen (TYLENOL) 325 MG tablet  adalimumab (HUMIRA) 40 MG/0.8ML prefilled syringe kit  amoxicillin-clavulanate (AUGMENTIN) 875-125 MG tablet  Artificial Saliva (BIOTENE MOISTURIZING MOUTH MT)  CALMOSEPTINE 0.44-20.6 % OINT ointment  ferrous sulfate (FE TABS) 325 (65 Fe) MG EC tablet  hydroxychloroquine (PLAQUENIL) 200 MG tablet  insulin glargine (LANTUS PEN) 100 UNIT/ML pen  Lactobacillus Acid-Pectin (LACTOBACILLUS ACIDOPHILUS) TABS  lisinopril (PRINIVIL/ZESTRIL) 20 MG tablet  metFORMIN (GLUCOPHAGE) 500  MG tablet  methotrexate 2.5 MG tablet  mirtazapine (REMERON) 15 MG tablet  Multiple Vitamins-Minerals (PRESERVISION AREDS PO)  nitroFURantoin macrocrystal (MACRODANTIN) 100 MG capsule          Review of Systems   Unable to perform ROS: Dementia       Physical Exam   BP: (!) 164/78  Pulse: 73  Temp: 98.2  F (36.8  C)  Resp: 22  SpO2: 99 %      Physical Exam  BP (!) 165/90   Pulse 75   Temp 98.2  F (36.8  C) (Axillary)   Resp 22   SpO2 99%   General: alert, interactive, in no apparent distress.  Alert, and oriented to person.  Not oriented to place, or time  Head: atraumatic  Nose: no rhinorrhea or epistaxis  Ears: no external auditory canal discharge or bleeding.    Eyes: Sclera nonicteric. Conjunctiva noninjected. PERRL, EOMI  Mouth: no tonsillar erythema, edema, or exudate  Neck: supple, no palp LAD  Lungs: CTAB  CV: RRR, S1/S2; peripheral pulses palpable and symmetric  Abdomen: soft, upper abdominal slight discomfort to palpation.  Nd, no guarding or rebound. Positive bowel sounds  Extremities: no cyanosis or edema  Skin: no rash or diaphoresis  Neuro: Moving all extremities      ED Course        Procedures               Critical Care time:  none               Results for orders placed or performed during the hospital encounter of 02/29/20 (from the past 24 hour(s))   CBC with platelets differential   Result Value Ref Range    WBC 5.4 4.0 - 11.0 10e9/L    RBC Count 3.69 (L) 4.4 - 5.9 10e12/L    Hemoglobin 11.9 (L) 13.3 - 17.7 g/dL    Hematocrit 35.5 (L) 40.0 - 53.0 %    MCV 96 78 - 100 fl    MCH 32.2 26.5 - 33.0 pg    MCHC 33.5 31.5 - 36.5 g/dL    RDW 13.2 10.0 - 15.0 %    Platelet Count 152 150 - 450 10e9/L    Diff Method Automated Method     % Neutrophils 68.3 %    % Lymphocytes 17.0 %    % Monocytes 10.8 %    % Eosinophils 2.8 %    % Basophils 0.7 %    % Immature Granulocytes 0.4 %    Nucleated RBCs 0 0 /100    Absolute Neutrophil 3.7 1.6 - 8.3 10e9/L    Absolute Lymphocytes 0.9 0.8 - 5.3 10e9/L    Absolute  Monocytes 0.6 0.0 - 1.3 10e9/L    Absolute Eosinophils 0.2 0.0 - 0.7 10e9/L    Absolute Basophils 0.0 0.0 - 0.2 10e9/L    Abs Immature Granulocytes 0.0 0 - 0.4 10e9/L    Absolute Nucleated RBC 0.0    Comprehensive metabolic panel   Result Value Ref Range    Sodium 141 133 - 144 mmol/L    Potassium 3.7 3.4 - 5.3 mmol/L    Chloride 108 94 - 109 mmol/L    Carbon Dioxide 28 20 - 32 mmol/L    Anion Gap 5 3 - 14 mmol/L    Glucose 135 (H) 70 - 99 mg/dL    Urea Nitrogen 17 7 - 30 mg/dL    Creatinine 0.80 0.66 - 1.25 mg/dL    GFR Estimate 81 >60 mL/min/[1.73_m2]    GFR Estimate If Black >90 >60 mL/min/[1.73_m2]    Calcium 8.7 8.5 - 10.1 mg/dL    Bilirubin Total 0.5 0.2 - 1.3 mg/dL    Albumin 3.0 (L) 3.4 - 5.0 g/dL    Protein Total 7.0 6.8 - 8.8 g/dL    Alkaline Phosphatase 78 40 - 150 U/L    ALT 18 0 - 70 U/L    AST 18 0 - 45 U/L   Lactic acid whole blood   Result Value Ref Range    Lactic Acid 1.0 0.7 - 2.0 mmol/L   Lipase   Result Value Ref Range    Lipase 146 73 - 393 U/L   UA reflex to Microscopic   Result Value Ref Range    Color Urine Yellow     Appearance Urine Cloudy     Glucose Urine Negative NEG^Negative mg/dL    Bilirubin Urine Negative NEG^Negative    Ketones Urine Negative NEG^Negative mg/dL    Specific Gravity Urine 1.009 1.003 - 1.035    Blood Urine Small (A) NEG^Negative    pH Urine 8.0 (H) 5.0 - 7.0 pH    Protein Albumin Urine Negative NEG^Negative mg/dL    Urobilinogen mg/dL 0.0 0.0 - 2.0 mg/dL    Nitrite Urine Negative NEG^Negative    Leukocyte Esterase Urine Moderate (A) NEG^Negative    Source Catheterized Urine     RBC Urine 13 (H) 0 - 2 /HPF    WBC Urine 27 (H) 0 - 5 /HPF    Bacteria Urine Few (A) NEG^Negative /HPF    Squamous Epithelial /HPF Urine <1 0 - 1 /HPF    Mucous Urine Present (A) NEG^Negative /LPF    Amorphous Crystals Few (A) NEG^Negative /HPF   CT Abdomen Pelvis w Contrast    Narrative    CT ABDOMEN AND PELVIS WITH CONTRAST 2/29/2020 1:17 PM     HISTORY: Upper abdominal pain, acute,  generalized. Right upper  quadrant tenderness on exam. Dementia.    COMPARISON: Renal ultrasound 9/23/2019.    TECHNIQUE: Axial images from the lung bases to the symphysis are  performed with additional coronal reformatted images. 80 mL of Isovue  370 are given intravenously.  Radiation dose for this scan was reduced  using automated exposure control, adjustment of the mA and/or kV  according to patient size, or iterative reconstruction technique.    FINDINGS: Dependent atelectasis is present at the lung bases with  trace bilateral pleural effusions. Lungs appear hyperinflated. Heart  is enlarged with significant dilatation of the right and left atria.    ABDOMEN: A few scattered tiny low-attenuation liver lesions are  present, too small to characterize by CT, but are probably tiny cysts.  The gallbladder, spleen, pancreas, adrenal glands and kidneys are  unremarkable. Scattered low-attenuation renal cortical lesions are  present, most consistent with cysts, but are too small to characterize  by CT. A slightly larger cyst lower pole right kidney is water density  measuring 1.9 cm on series 5, image 101. This is a simple cyst. No  hydronephrosis or obvious urinary tract calculi. No enlarged lymph  nodes. The bowel is normal in caliber without obstruction or  diverticulitis. Appendix not definitely identified. Moderate  constipation is noted. Fecal debris is scattered throughout the entire  colon.    Pelvis: Irregular bladder wall thickening is present. Bladder is  partially decompressed with a Murcia catheter. UTI is possible. Bladder  malignancy difficult to exclude. No enlarged pelvic lymph nodes or  free fluid. Degenerative spine changes are present. No destructive  bone changes are appreciated. Overall bones appear osteopenic.      Impression    IMPRESSION:  1. Moderate colonic constipation. No evidence of colitis,  diverticulitis or obstruction. Appendix not visualized.  2. Irregular bladder wall thickening  possibly related to incomplete  distention. UTI and bladder malignancy remain in the differential.  Follow-up as clinically warranted.  3. Indeterminate tiny low-attenuation liver and renal lesions most  likely cysts. The vast majority of these are too small to characterize  by CT, but are of doubtful clinical significance.     NALDO YIN MD   UA reflex to Microscopic   Result Value Ref Range    Color Urine Straw     Appearance Urine Clear     Glucose Urine Negative NEG^Negative mg/dL    Bilirubin Urine Negative NEG^Negative    Ketones Urine Negative NEG^Negative mg/dL    Specific Gravity Urine 1.025 1.003 - 1.035    Blood Urine Moderate (A) NEG^Negative    pH Urine 8.0 (H) 5.0 - 7.0 pH    Protein Albumin Urine Negative NEG^Negative mg/dL    Urobilinogen mg/dL 0.0 0.0 - 2.0 mg/dL    Nitrite Urine Negative NEG^Negative    Leukocyte Esterase Urine Moderate (A) NEG^Negative    Source Catheterized Urine     RBC Urine 47 (H) 0 - 2 /HPF    WBC Urine 46 (H) 0 - 5 /HPF       Medications   0.9% sodium chloride BOLUS (1,000 mLs Intravenous New Bag 2/29/20 1228)     Followed by   sodium chloride 0.9% infusion (has no administration in time range)   iopamidol (ISOVUE-370) solution 79 mL (79 mLs Intravenous Given 2/29/20 1315)   sodium chloride 0.9 % bag 500mL for CT scan flush use (60 mLs As instructed Given 2/29/20 1315)   cephALEXin (KEFLEX) capsule 500 mg (500 mg Oral Given 2/29/20 1411)       12:20PM Patient assessed.   Assessments & Plan (with Medical Decision Making)  86 year old male, presenting to the emergency department from his care facility, where patient resides in memory care unit for concerns regarding abdominal pain, and generalized fatigue.  History is not able to be obtained from patient secondary to dementia.  Chart review was performed, in addition to nurse discussion with the patient's son.  Concern was that patient had exhibited similar types of symptoms previously when patient was noted to be septic.   Patient arrives afebrile, slightly hypertensive, and otherwise unremarkable vitals.  Lungs are clear to auscultation.  No external signs of infection.  Does have some excoriated skin near the groin.  However, no signs of cellulitis.  Differential is broad, however includes bowel obstruction, infection, diverticulitis, colitis, pyelonephritis.    Given concerns regarding leakage around the Murcia catheter site, the Murcia catheter was replaced, and urinalysis performed.  CMP is unremarkable, with normal LFTs, normal bilirubin.  Lipase and lactate are normal.  White blood cell count is also normal.  CT scan of the abdomen/pelvis performed and shows constipation with no evidence of colitis, diverticulitis, or obstruction.  There is also irregular bladder wall thickening, of uncertain significance.    Urinalysis was repeated after Murcia catheter changed.  Urinalysis showing signs of potential infection, with white blood cells which are present.  Urine culture will be ordered, and patient will be prescribed Keflex.  His most recent urine cultures on the last 3-4 occasions are reviewed in medical records, and showed pan sensitivity.  Therefore Keflex will be prescribed.  Urine culture pending.  Follow-up in clinic as needed.  No other obvious source of infection, or acute laboratory abnormality that would require additional work-up at this point..        I have reviewed the nursing notes.    I have reviewed the findings, diagnosis, plan and need for follow up with the patient.       New Prescriptions    CEPHALEXIN (KEFLEX) 500 MG CAPSULE    Take 1 capsule (500 mg) by mouth 3 times daily for 7 days       Final diagnoses:   Abdominal pain, unspecified abdominal location   Urinary tract infection associated with indwelling urethral catheter, initial encounter (H)   Hypertension, unspecified type   Dementia without behavioral disturbance, unspecified dementia type (H)   This document serves as a record of the services and  decisions personally performed and made by Issac Ryder MD. It was created on HIS/HER behalf by   Jazmín Gomez, a trained medical scribe. The creation of this document is based the provider's statements to the medical scribe.  Jazmín Gomez 12:20 PM 2/29/2020    Provider:   The information in this document, created by the medical scribe for me, accurately reflects the services I personally performed and the decisions made by me. I have reviewed and approved this document for accuracy prior to leaving the patient care area.  Issac Ryder MD 12:20 PM 2/29/2020 2/29/2020   [facility] EMERGENCY DEPARTMENT     Issac Ryder MD  02/29/20 1411

## 2020-02-29 NOTE — ED AVS SNAPSHOT
[facility] Emergency Department  [address] [address]  Phone:  [phone]  Fax:  [phone]                                    Maxime Powell   MRN: [id number]    Department:  [facility] Emergency Department   Date of Visit:  2/29/2020           After Visit Summary Signature Page    I have received my discharge instructions, and my questions have been answered. I have discussed any challenges I see with this plan with the nurse or doctor.    ..........................................................................................................................................  Patient/Patient Representative Signature      ..........................................................................................................................................  Patient Representative Print Name and Relationship to Patient    ..................................................               ................................................  Date                                   Time    ..........................................................................................................................................  Reviewed by Signature/Title    ...................................................              ..............................................  Date                                               Time          22EPIC Rev 08/18

## 2020-02-29 NOTE — ED NOTES
Patient arrives by EMS from [facility]. EMS reports that the healthcare facility stated that he had yellow drainage around the catheter and he was more sleepy than usual. No answer at the [facility] facility after several attempted phone calls. This RN spoke with the son, Cheri [phone], and he answered questions for the reason his father was sent in. The patient has a history of urosepsis and he was more sleepy this morning, than usual. And he was leaking urine around the catheter.

## 2020-02-29 NOTE — DISCHARGE INSTRUCTIONS
Take antibiotics as prescribed.   Urine culture is pending.    there was some evidence of constipation on CT scan.  Recommend daily stool softener

## 2020-02-29 NOTE — ED NOTES
Pt has sore on left upper thigh and tip of penis has redness as well as top of testicles. Placed barrier cream on wounds and will send home with pt

## 2020-02-29 NOTE — ED NOTES
Son called from [place]. Almost here. Wants pt ready to go. Wants rx filled here. Sent to pharm. Gave more coffee

## 2020-03-01 LAB
BACTERIA SPEC CULT: NORMAL
Lab: NORMAL
SPECIMEN SOURCE: NORMAL

## 2020-03-02 NOTE — RESULT ENCOUNTER NOTE
Final urine culture report is NEGATIVE per [facility] ED Lab Result protocol.    If NEGATIVE result, no change in treatment, per [facility] ED Lab Result protocol.

## 2020-05-13 ENCOUNTER — RECORDS - HEALTHEAST (OUTPATIENT)
Dept: LAB | Facility: CLINIC | Age: 85
End: 2020-05-13

## 2020-05-13 LAB
ALBUMIN SERPL-MCNC: 3.1 G/DL (ref 3.5–5)
ALP SERPL-CCNC: 69 U/L (ref 45–120)
ALT SERPL W P-5'-P-CCNC: 13 U/L (ref 0–45)
AST SERPL W P-5'-P-CCNC: 16 U/L (ref 0–40)
BASOPHILS # BLD AUTO: 0 THOU/UL (ref 0–0.2)
BASOPHILS NFR BLD AUTO: 0 % (ref 0–2)
BILIRUB SERPL-MCNC: 0.4 MG/DL (ref 0–1)
BUN SERPL-MCNC: 18 MG/DL (ref 8–28)
C REACTIVE PROTEIN LHE: 0.5 MG/DL (ref 0–0.8)
CREAT SERPL-MCNC: 1.11 MG/DL (ref 0.7–1.3)
EOSINOPHIL # BLD AUTO: 0.1 THOU/UL (ref 0–0.4)
EOSINOPHIL NFR BLD AUTO: 3 % (ref 0–6)
ERYTHROCYTE [DISTWIDTH] IN BLOOD BY AUTOMATED COUNT: 13.6 % (ref 11–14.5)
ERYTHROCYTE [SEDIMENTATION RATE] IN BLOOD BY WESTERGREN METHOD: 39 MM/HR (ref 0–15)
GFR SERPL CREATININE-BSD FRML MDRD: >60 ML/MIN/1.73M2
HCT VFR BLD AUTO: 37.5 % (ref 40–54)
HGB BLD-MCNC: 12.1 G/DL (ref 14–18)
LYMPHOCYTES # BLD AUTO: 1.1 THOU/UL (ref 0.8–4.4)
LYMPHOCYTES NFR BLD AUTO: 20 % (ref 20–40)
MCH RBC QN AUTO: 31.3 PG (ref 27–34)
MCHC RBC AUTO-ENTMCNC: 32.3 G/DL (ref 32–36)
MCV RBC AUTO: 97 FL (ref 80–100)
MONOCYTES # BLD AUTO: 0.5 THOU/UL (ref 0–0.9)
MONOCYTES NFR BLD AUTO: 9 % (ref 2–10)
NEUTROPHILS # BLD AUTO: 3.9 THOU/UL (ref 2–7.7)
NEUTROPHILS NFR BLD AUTO: 68 % (ref 50–70)
PLATELET # BLD AUTO: 157 THOU/UL (ref 140–440)
PMV BLD AUTO: 11 FL (ref 8.5–12.5)
RBC # BLD AUTO: 3.86 MILL/UL (ref 4.4–6.2)
WBC: 5.7 THOU/UL (ref 4–11)

## 2020-05-21 NOTE — ED NOTES
Critical Lab result - Lactic Acid 3.5  Dr. Ling notified.    [No Acute Distress] : no acute distress [Normal Appearance] : normal appearance [Normal Oropharynx] : normal oropharynx [Normal Rate/Rhythm] : normal rate/rhythm [No Neck Mass] : no neck mass [No Murmurs] : no murmurs [Normal S1, S2] : normal s1, s2 [No Resp Distress] : no resp distress [No Abnormalities] : no abnormalities [Clear to Auscultation Bilaterally] : clear to auscultation bilaterally [Normal Gait] : normal gait [Benign] : benign [No Clubbing] : no clubbing [No Edema] : no edema [No Cyanosis] : no cyanosis [Normal Color/ Pigmentation] : normal color/ pigmentation [FROM] : FROM [No Focal Deficits] : no focal deficits [Oriented x3] : oriented x3 [Normal Affect] : normal affect

## 2020-05-28 ENCOUNTER — RECORDS - HEALTHEAST (OUTPATIENT)
Dept: LAB | Facility: CLINIC | Age: 85
End: 2020-05-28

## 2020-05-28 LAB
ALBUMIN UR-MCNC: ABNORMAL MG/DL
AMORPH CRY #/AREA URNS HPF: ABNORMAL /[HPF]
APPEARANCE UR: ABNORMAL
BACTERIA #/AREA URNS HPF: ABNORMAL HPF
BILIRUB UR QL STRIP: NEGATIVE
CAOX CRY #/AREA URNS HPF: PRESENT /[HPF]
COLOR UR AUTO: YELLOW
GLUCOSE UR STRIP-MCNC: NEGATIVE MG/DL
HGB UR QL STRIP: ABNORMAL
KETONES UR STRIP-MCNC: NEGATIVE MG/DL
LEUKOCYTE ESTERASE UR QL STRIP: ABNORMAL
MUCOUS THREADS #/AREA URNS LPF: ABNORMAL LPF
NITRATE UR QL: POSITIVE
PH UR STRIP: 5.5 [PH] (ref 4.5–8)
RBC #/AREA URNS AUTO: ABNORMAL HPF
SP GR UR STRIP: 1.02 (ref 1–1.03)
SQUAMOUS #/AREA URNS AUTO: ABNORMAL LPF
UROBILINOGEN UR STRIP-ACNC: ABNORMAL
WBC #/AREA URNS AUTO: >100 HPF
WBC CLUMPS #/AREA URNS HPF: PRESENT /[HPF]

## 2020-05-31 LAB — BACTERIA SPEC CULT: ABNORMAL

## 2020-06-07 ENCOUNTER — APPOINTMENT (OUTPATIENT)
Dept: CT IMAGING | Facility: CLINIC | Age: 85
DRG: 919 | End: 2020-06-07
Attending: FAMILY MEDICINE
Payer: MEDICARE

## 2020-06-07 ENCOUNTER — HOSPITAL ENCOUNTER (INPATIENT)
Facility: CLINIC | Age: 85
LOS: 4 days | Discharge: NURSING, REHAB OR LONG-TERM CARE | DRG: 919 | End: 2020-06-11
Attending: FAMILY MEDICINE | Admitting: FAMILY MEDICINE
Payer: MEDICARE

## 2020-06-07 DIAGNOSIS — N30.00 ACUTE CYSTITIS WITHOUT HEMATURIA: ICD-10-CM

## 2020-06-07 DIAGNOSIS — E83.42 HYPOMAGNESEMIA: ICD-10-CM

## 2020-06-07 DIAGNOSIS — E87.6 HYPOKALEMIA: ICD-10-CM

## 2020-06-07 DIAGNOSIS — M19.011 OSTEOARTHRITIS OF RIGHT GLENOHUMERAL JOINT: Primary | ICD-10-CM

## 2020-06-07 DIAGNOSIS — E11.69 TYPE 2 DIABETES MELLITUS WITH OTHER SPECIFIED COMPLICATION, UNSPECIFIED WHETHER LONG TERM INSULIN USE (H): ICD-10-CM

## 2020-06-07 DIAGNOSIS — R65.20 SEPSIS WITH ENCEPHALOPATHY WITHOUT SEPTIC SHOCK, DUE TO UNSPECIFIED ORGANISM (H): ICD-10-CM

## 2020-06-07 DIAGNOSIS — A41.9 SEPSIS WITH ENCEPHALOPATHY WITHOUT SEPTIC SHOCK, DUE TO UNSPECIFIED ORGANISM (H): ICD-10-CM

## 2020-06-07 DIAGNOSIS — Z20.822 SUSPECTED 2019-NCOV INFECTION: ICD-10-CM

## 2020-06-07 DIAGNOSIS — G93.41 SEPSIS WITH ENCEPHALOPATHY WITHOUT SEPTIC SHOCK, DUE TO UNSPECIFIED ORGANISM (H): ICD-10-CM

## 2020-06-07 LAB
ALBUMIN SERPL-MCNC: 2.6 G/DL (ref 3.4–5)
ALBUMIN UR-MCNC: 100 MG/DL
ALP SERPL-CCNC: 91 U/L (ref 40–150)
ALT SERPL W P-5'-P-CCNC: 24 U/L (ref 0–70)
ANION GAP SERPL CALCULATED.3IONS-SCNC: 6 MMOL/L (ref 3–14)
ANION GAP SERPL CALCULATED.3IONS-SCNC: 7 MMOL/L (ref 3–14)
APPEARANCE UR: ABNORMAL
AST SERPL W P-5'-P-CCNC: 25 U/L (ref 0–45)
BACTERIA #/AREA URNS HPF: ABNORMAL /HPF
BASE EXCESS BLDV CALC-SCNC: 3.4 MMOL/L
BASE EXCESS BLDV CALC-SCNC: 4.5 MMOL/L
BASOPHILS # BLD AUTO: 0 10E9/L (ref 0–0.2)
BASOPHILS NFR BLD AUTO: 0.2 %
BILIRUB SERPL-MCNC: 0.5 MG/DL (ref 0.2–1.3)
BILIRUB UR QL STRIP: NEGATIVE
BUN SERPL-MCNC: 43 MG/DL (ref 7–30)
BUN SERPL-MCNC: 45 MG/DL (ref 7–30)
CALCIUM SERPL-MCNC: 8.9 MG/DL (ref 8.5–10.1)
CALCIUM SERPL-MCNC: 9.8 MG/DL (ref 8.5–10.1)
CHLORIDE SERPL-SCNC: 117 MMOL/L (ref 94–109)
CHLORIDE SERPL-SCNC: 121 MMOL/L (ref 94–109)
CO2 SERPL-SCNC: 28 MMOL/L (ref 20–32)
CO2 SERPL-SCNC: 29 MMOL/L (ref 20–32)
COLOR UR AUTO: YELLOW
CREAT SERPL-MCNC: 1.26 MG/DL (ref 0.66–1.25)
CREAT SERPL-MCNC: 1.32 MG/DL (ref 0.66–1.25)
DIFFERENTIAL METHOD BLD: ABNORMAL
EOSINOPHIL # BLD AUTO: 0 10E9/L (ref 0–0.7)
EOSINOPHIL NFR BLD AUTO: 0 %
ERYTHROCYTE [DISTWIDTH] IN BLOOD BY AUTOMATED COUNT: 14 % (ref 10–15)
GFR SERPL CREATININE-BSD FRML MDRD: 48 ML/MIN/{1.73_M2}
GFR SERPL CREATININE-BSD FRML MDRD: 51 ML/MIN/{1.73_M2}
GLUCOSE SERPL-MCNC: 229 MG/DL (ref 70–99)
GLUCOSE SERPL-MCNC: 303 MG/DL (ref 70–99)
GLUCOSE UR STRIP-MCNC: NEGATIVE MG/DL
HBA1C MFR BLD: 7.5 % (ref 0–5.6)
HCO3 BLDV-SCNC: 29 MMOL/L (ref 21–28)
HCO3 BLDV-SCNC: 29 MMOL/L (ref 21–28)
HCT VFR BLD AUTO: 39.7 % (ref 40–53)
HGB BLD-MCNC: 12.9 G/DL (ref 13.3–17.7)
HGB UR QL STRIP: ABNORMAL
IMM GRANULOCYTES # BLD: 0.1 10E9/L (ref 0–0.4)
IMM GRANULOCYTES NFR BLD: 0.4 %
KETONES UR STRIP-MCNC: NEGATIVE MG/DL
LACTATE BLD-SCNC: 1.8 MMOL/L (ref 0.7–2)
LACTATE BLD-SCNC: 2.3 MMOL/L (ref 0.7–2)
LEUKOCYTE ESTERASE UR QL STRIP: ABNORMAL
LYMPHOCYTES # BLD AUTO: 0.7 10E9/L (ref 0.8–5.3)
LYMPHOCYTES NFR BLD AUTO: 5.7 %
MCH RBC QN AUTO: 30.9 PG (ref 26.5–33)
MCHC RBC AUTO-ENTMCNC: 32.5 G/DL (ref 31.5–36.5)
MCV RBC AUTO: 95 FL (ref 78–100)
MONOCYTES # BLD AUTO: 0.9 10E9/L (ref 0–1.3)
MONOCYTES NFR BLD AUTO: 7.2 %
NEUTROPHILS # BLD AUTO: 10.4 10E9/L (ref 1.6–8.3)
NEUTROPHILS NFR BLD AUTO: 86.5 %
NITRATE UR QL: NEGATIVE
NRBC # BLD AUTO: 0 10*3/UL
NRBC BLD AUTO-RTO: 0 /100
O2/TOTAL GAS SETTING VFR VENT: 0 %
O2/TOTAL GAS SETTING VFR VENT: 21 %
PCO2 BLDV: 42 MM HG (ref 40–50)
PCO2 BLDV: 45 MM HG (ref 40–50)
PH BLDV: 7.41 PH (ref 7.32–7.43)
PH BLDV: 7.45 PH (ref 7.32–7.43)
PH UR STRIP: 5 PH (ref 5–7)
PLATELET # BLD AUTO: 180 10E9/L (ref 150–450)
PO2 BLDV: 17 MM HG (ref 25–47)
PO2 BLDV: 21 MM HG (ref 25–47)
POTASSIUM SERPL-SCNC: 3.1 MMOL/L (ref 3.4–5.3)
POTASSIUM SERPL-SCNC: 3.1 MMOL/L (ref 3.4–5.3)
PROT SERPL-MCNC: 7.9 G/DL (ref 6.8–8.8)
RBC # BLD AUTO: 4.18 10E12/L (ref 4.4–5.9)
RBC #/AREA URNS AUTO: 24 /HPF (ref 0–2)
SARS-COV-2 PCR COMMENT: NORMAL
SARS-COV-2 RNA SPEC QL NAA+PROBE: NEGATIVE
SARS-COV-2 RNA SPEC QL NAA+PROBE: NORMAL
SODIUM SERPL-SCNC: 153 MMOL/L (ref 133–144)
SODIUM SERPL-SCNC: 155 MMOL/L (ref 133–144)
SOURCE: ABNORMAL
SP GR UR STRIP: 1.01 (ref 1–1.03)
SPECIMEN SOURCE: NORMAL
SPECIMEN SOURCE: NORMAL
UROBILINOGEN UR STRIP-MCNC: 0 MG/DL (ref 0–2)
WBC # BLD AUTO: 12 10E9/L (ref 4–11)
WBC #/AREA URNS AUTO: >182 /HPF (ref 0–5)

## 2020-06-07 PROCEDURE — 87186 SC STD MICRODIL/AGAR DIL: CPT | Performed by: FAMILY MEDICINE

## 2020-06-07 PROCEDURE — 82803 BLOOD GASES ANY COMBINATION: CPT | Performed by: FAMILY MEDICINE

## 2020-06-07 PROCEDURE — 25800030 ZZH RX IP 258 OP 636: Performed by: FAMILY MEDICINE

## 2020-06-07 PROCEDURE — 81001 URINALYSIS AUTO W/SCOPE: CPT | Performed by: FAMILY MEDICINE

## 2020-06-07 PROCEDURE — 82803 BLOOD GASES ANY COMBINATION: CPT | Performed by: PHYSICIAN ASSISTANT

## 2020-06-07 PROCEDURE — 87086 URINE CULTURE/COLONY COUNT: CPT | Performed by: FAMILY MEDICINE

## 2020-06-07 PROCEDURE — 25000128 H RX IP 250 OP 636: Performed by: FAMILY MEDICINE

## 2020-06-07 PROCEDURE — 83605 ASSAY OF LACTIC ACID: CPT | Performed by: FAMILY MEDICINE

## 2020-06-07 PROCEDURE — 99285 EMERGENCY DEPT VISIT HI MDM: CPT | Mod: Z6 | Performed by: FAMILY MEDICINE

## 2020-06-07 PROCEDURE — 12000000 ZZH R&B MED SURG/OB

## 2020-06-07 PROCEDURE — 87040 BLOOD CULTURE FOR BACTERIA: CPT | Performed by: FAMILY MEDICINE

## 2020-06-07 PROCEDURE — 96361 HYDRATE IV INFUSION ADD-ON: CPT | Performed by: FAMILY MEDICINE

## 2020-06-07 PROCEDURE — 71260 CT THORAX DX C+: CPT

## 2020-06-07 PROCEDURE — 25000125 ZZHC RX 250: Performed by: FAMILY MEDICINE

## 2020-06-07 PROCEDURE — 85025 COMPLETE CBC W/AUTO DIFF WBC: CPT | Performed by: FAMILY MEDICINE

## 2020-06-07 PROCEDURE — 96365 THER/PROPH/DIAG IV INF INIT: CPT | Mod: 59 | Performed by: FAMILY MEDICINE

## 2020-06-07 PROCEDURE — 96366 THER/PROPH/DIAG IV INF ADDON: CPT | Performed by: FAMILY MEDICINE

## 2020-06-07 PROCEDURE — 36415 COLL VENOUS BLD VENIPUNCTURE: CPT | Performed by: PHYSICIAN ASSISTANT

## 2020-06-07 PROCEDURE — 83036 HEMOGLOBIN GLYCOSYLATED A1C: CPT | Performed by: PHYSICIAN ASSISTANT

## 2020-06-07 PROCEDURE — C9803 HOPD COVID-19 SPEC COLLECT: HCPCS | Performed by: FAMILY MEDICINE

## 2020-06-07 PROCEDURE — 87088 URINE BACTERIA CULTURE: CPT | Performed by: FAMILY MEDICINE

## 2020-06-07 PROCEDURE — U0003 INFECTIOUS AGENT DETECTION BY NUCLEIC ACID (DNA OR RNA); SEVERE ACUTE RESPIRATORY SYNDROME CORONAVIRUS 2 (SARS-COV-2) (CORONAVIRUS DISEASE [COVID-19]), AMPLIFIED PROBE TECHNIQUE, MAKING USE OF HIGH THROUGHPUT TECHNOLOGIES AS DESCRIBED BY CMS-2020-01-R: HCPCS | Performed by: FAMILY MEDICINE

## 2020-06-07 PROCEDURE — 99285 EMERGENCY DEPT VISIT HI MDM: CPT | Mod: 25 | Performed by: FAMILY MEDICINE

## 2020-06-07 PROCEDURE — 80048 BASIC METABOLIC PNL TOTAL CA: CPT | Performed by: PHYSICIAN ASSISTANT

## 2020-06-07 PROCEDURE — 83605 ASSAY OF LACTIC ACID: CPT | Performed by: PHYSICIAN ASSISTANT

## 2020-06-07 PROCEDURE — 80053 COMPREHEN METABOLIC PANEL: CPT | Performed by: FAMILY MEDICINE

## 2020-06-07 PROCEDURE — 99223 1ST HOSP IP/OBS HIGH 75: CPT | Mod: AI | Performed by: PHYSICIAN ASSISTANT

## 2020-06-07 RX ORDER — ACETAMINOPHEN 325 MG/1
650 TABLET ORAL EVERY 4 HOURS PRN
Status: DISCONTINUED | OUTPATIENT
Start: 2020-06-07 | End: 2020-06-11 | Stop reason: HOSPADM

## 2020-06-07 RX ORDER — BISACODYL 5 MG
15 TABLET, DELAYED RELEASE (ENTERIC COATED) ORAL DAILY PRN
Status: DISCONTINUED | OUTPATIENT
Start: 2020-06-07 | End: 2020-06-11 | Stop reason: HOSPADM

## 2020-06-07 RX ORDER — ACETAMINOPHEN 650 MG/1
650 SUPPOSITORY RECTAL EVERY 4 HOURS PRN
Status: DISCONTINUED | OUTPATIENT
Start: 2020-06-07 | End: 2020-06-11 | Stop reason: HOSPADM

## 2020-06-07 RX ORDER — PROCHLORPERAZINE 25 MG
12.5 SUPPOSITORY, RECTAL RECTAL EVERY 12 HOURS PRN
Status: DISCONTINUED | OUTPATIENT
Start: 2020-06-07 | End: 2020-06-11 | Stop reason: HOSPADM

## 2020-06-07 RX ORDER — LEVOFLOXACIN 5 MG/ML
750 INJECTION, SOLUTION INTRAVENOUS ONCE
Status: COMPLETED | OUTPATIENT
Start: 2020-06-07 | End: 2020-06-07

## 2020-06-07 RX ORDER — LIDOCAINE 40 MG/G
CREAM TOPICAL
Status: DISCONTINUED | OUTPATIENT
Start: 2020-06-07 | End: 2020-06-11 | Stop reason: HOSPADM

## 2020-06-07 RX ORDER — IOPAMIDOL 755 MG/ML
79 INJECTION, SOLUTION INTRAVASCULAR ONCE
Status: COMPLETED | OUTPATIENT
Start: 2020-06-07 | End: 2020-06-07

## 2020-06-07 RX ORDER — DEXTROSE MONOHYDRATE 25 G/50ML
25-50 INJECTION, SOLUTION INTRAVENOUS
Status: DISCONTINUED | OUTPATIENT
Start: 2020-06-07 | End: 2020-06-11 | Stop reason: HOSPADM

## 2020-06-07 RX ORDER — BISACODYL 5 MG
5 TABLET, DELAYED RELEASE (ENTERIC COATED) ORAL DAILY PRN
Status: DISCONTINUED | OUTPATIENT
Start: 2020-06-07 | End: 2020-06-11 | Stop reason: HOSPADM

## 2020-06-07 RX ORDER — NICOTINE POLACRILEX 4 MG
15-30 LOZENGE BUCCAL
Status: DISCONTINUED | OUTPATIENT
Start: 2020-06-07 | End: 2020-06-11 | Stop reason: HOSPADM

## 2020-06-07 RX ORDER — BISACODYL 5 MG
10 TABLET, DELAYED RELEASE (ENTERIC COATED) ORAL DAILY PRN
Status: DISCONTINUED | OUTPATIENT
Start: 2020-06-07 | End: 2020-06-11 | Stop reason: HOSPADM

## 2020-06-07 RX ORDER — ONDANSETRON 2 MG/ML
4 INJECTION INTRAMUSCULAR; INTRAVENOUS EVERY 6 HOURS PRN
Status: DISCONTINUED | OUTPATIENT
Start: 2020-06-07 | End: 2020-06-11 | Stop reason: HOSPADM

## 2020-06-07 RX ORDER — NALOXONE HYDROCHLORIDE 0.4 MG/ML
.1-.4 INJECTION, SOLUTION INTRAMUSCULAR; INTRAVENOUS; SUBCUTANEOUS
Status: DISCONTINUED | OUTPATIENT
Start: 2020-06-07 | End: 2020-06-11 | Stop reason: HOSPADM

## 2020-06-07 RX ORDER — SODIUM CHLORIDE, SODIUM LACTATE, POTASSIUM CHLORIDE, CALCIUM CHLORIDE 600; 310; 30; 20 MG/100ML; MG/100ML; MG/100ML; MG/100ML
INJECTION, SOLUTION INTRAVENOUS CONTINUOUS
Status: DISCONTINUED | OUTPATIENT
Start: 2020-06-07 | End: 2020-06-08

## 2020-06-07 RX ORDER — ONDANSETRON 4 MG/1
4 TABLET, ORALLY DISINTEGRATING ORAL EVERY 6 HOURS PRN
Status: DISCONTINUED | OUTPATIENT
Start: 2020-06-07 | End: 2020-06-11 | Stop reason: HOSPADM

## 2020-06-07 RX ORDER — PROCHLORPERAZINE MALEATE 5 MG
5 TABLET ORAL EVERY 6 HOURS PRN
Status: DISCONTINUED | OUTPATIENT
Start: 2020-06-07 | End: 2020-06-11 | Stop reason: HOSPADM

## 2020-06-07 RX ORDER — POLYETHYLENE GLYCOL 3350 17 G/17G
17 POWDER, FOR SOLUTION ORAL DAILY PRN
Status: DISCONTINUED | OUTPATIENT
Start: 2020-06-07 | End: 2020-06-11 | Stop reason: HOSPADM

## 2020-06-07 RX ADMIN — SODIUM CHLORIDE 1000 ML: 9 INJECTION, SOLUTION INTRAVENOUS at 17:27

## 2020-06-07 RX ADMIN — SODIUM CHLORIDE 60 ML: 9 INJECTION, SOLUTION INTRAVENOUS at 18:52

## 2020-06-07 RX ADMIN — IOPAMIDOL 79 ML: 755 INJECTION, SOLUTION INTRAVENOUS at 18:52

## 2020-06-07 RX ADMIN — SODIUM CHLORIDE 750 ML: 9 INJECTION, SOLUTION INTRAVENOUS at 20:29

## 2020-06-07 RX ADMIN — LEVOFLOXACIN 750 MG: 5 INJECTION, SOLUTION INTRAVENOUS at 17:59

## 2020-06-07 ASSESSMENT — MIFFLIN-ST. JEOR: SCORE: 1413.75

## 2020-06-07 NOTE — ED TRIAGE NOTES
pt sent to ED by RN home with concerns of suspected covid, altered mental status, pulled out martinez cath, aggitated and lethargic

## 2020-06-07 NOTE — ED NOTES
Pt sent to ED by RN home with concerns of suspected covid, altered mental status, pulled out martinez cath, aggitated and lethargic. Pt reportedly removed his martinez cath today. Pt reportedly not acting like himself, unable to establish baseline at this time. Pt answering some questions , very difficult to understand. When asked what his name is pt responds with indistinguishable response.

## 2020-06-07 NOTE — ED PROVIDER NOTES
HPI   The patient is an 86-year-old male presenting by EMS transport from a memory care assisted living environment with concern for change in mental status and possible COVID-19.  The patient does not provide a good history.  I called the assisted living center listed in the patient's demographic section and there was no answer.  EMS provided a history that the patient has been irritable and not himself today.  He apparently pulled out his Murcia catheter.  I am uncertain about his mobility.  I am uncertain about his baseline behavior.  The patient currently denies pain.  He is known to have a urinary tract infection diagnosed about a week ago.  It appears he is on Keflex.    1704.  Spoke with the patient's son by phone.  He tells me that he was walking 2 days ago.  Yesterday he became lethargic and stayed in his chair most of the day.  He was thought to have a urinary tract infection and so Keflex was started.  Today he pulled out his catheter and was described as weak and mumbling.  Nursing also was concerned for dehydration.        Allergies:  Allergies   Allergen Reactions     Finasteride Other (See Comments)     Gynecomastia, Required tamoxifen treatment     Penicillins Other (See Comments) and Swelling     Erythema     Gold Rash     Injectable gold     Problem List:    Patient Active Problem List    Diagnosis Date Noted     Acute cystitis without hematuria 06/08/2020     Priority: Medium     Septic encephalopathy 06/08/2020     Priority: Medium     Sepsis (H) 06/07/2020     Priority: Medium     Septic shock due to urinary tract infection (H) 03/27/2019     Priority: Medium     Urinary tract infection associated with indwelling urinary catheter (H) 03/27/2019     Priority: Medium     Acute renal injury due to sepsis (H) 03/27/2019     Priority: Medium     Immunosuppressed status (H) 03/27/2019     Priority: Medium     Atrial fibrillation with rapid ventricular response (H) 03/27/2019     Priority: Medium      Septic shock (H) 03/27/2019     Priority: Medium     Coronary atherosclerosis 01/31/2018     Priority: Medium     Cough 01/31/2018     Priority: Medium     Elevated prostate specific antigen (PSA) 01/31/2018     Priority: Medium     Lumbago 01/31/2018     Priority: Medium     Migraine headache 01/31/2018     Priority: Medium     Nocturia 01/31/2018     Priority: Medium     Olecranon bursitis 01/31/2018     Priority: Medium     Other symptoms involving digestive system(787.99) 01/31/2018     Priority: Medium     Pain in joint, shoulder region 01/31/2018     Priority: Medium     Urinary frequency 01/31/2018     Priority: Medium     Aftercare following surgery of the musculoskeletal system 11/04/2014     Priority: Medium     Acute urinary retention 05/07/2014     Priority: Medium     ASCVD (arteriosclerotic cardiovascular disease) 05/07/2014     Priority: Medium     Overview:   S/p RCA stenting 2001       Diabetes mellitus, type 2 (H) 05/07/2014     Priority: Medium     Hyperlipidemia 05/07/2014     Priority: Medium     Hypertension 05/07/2014     Priority: Medium     Rheumatoid arthritis (H) 05/07/2014     Priority: Medium     Osteoarthritis of glenohumeral joint 02/13/2014     Priority: Medium     Prostate cancer (H) 08/27/2013     Priority: Medium     Overview:   S/p radition tx 1987 in [place]         Past Medical History:    Past Medical History:   Diagnosis Date     Atherosclerotic heart disease of native coronary artery without angina pectoris      Essential (primary) hypertension      Hyperlipidemia      Mononeuropathy of left lower extremity      Osteoarthritis      Personal history of malignant neoplasm of prostate      RA (rheumatoid arthritis) (H)      Rheumatoid arthritis (H)      Sjogren-Ashwin syndrome      Strain of muscle, fascia and tendon of long head of biceps, unspecified arm, initial encounter      Type 2 diabetes mellitus without complications (H)      UTI (urinary tract infection) 9/21/2019  "    Past Surgical History:    Past Surgical History:   Procedure Laterality Date     APPENDECTOMY OPEN      1960     ARTHROSCOPY SHOULDER ROTATOR CUFF REPAIR      2002,right, Jim Chanel MD     ARTHROTOMY WRIST      1986     COLONOSCOPY      2014     HEART CATH, ANGIOPLASTY      2001,right coronary artery     OTHER SURGICAL HISTORY      2014,[id number],SHOULDER REPLACEMENT,Right,reverse     OTHER SURGICAL HISTORY      33277.0,NM BONE SCAN 3 PHASE,13 negative for  metastases     OTHER SURGICAL HISTORY      197229,OTHER,no evidence of metastases     OTHER SURGICAL HISTORY      [id number],OTHER     OTHER SURGICAL HISTORY      138267,OTHER,right     Family History:    Family History   Problem Relation Age of Onset     Cancer Father         Cancer,throat cancer     Breast Cancer Sister         Cancer-breast     Social History:  Marital Status:   [5]  Social History     Tobacco Use     Smoking status: Former Smoker     Years: 30.00     Types: Cigarettes     Last attempt to quit: 1980     Years since quittin.4     Smokeless tobacco: Former User     Types: Chew     Quit date: 2014     Tobacco comment: Quit smoking: quit smoking 33 years ago. Chew's daily   Substance Use Topics     Alcohol use: No     Drug use: No     Types: Other     Comment: Drug use: No      Medications:    No current outpatient medications on file.    Review of Systems   Unable to perform ROS: Mental status change       PE   BP: 139/89  Pulse: 71  Heart Rate: 98  Temp: 98.2  F (36.8  C)  Resp: 20  Height: 188 cm (6' 2\")  Weight: 66.4 kg (146 lb 6.2 oz)  SpO2: 99 %  Physical Exam  Vitals signs and nursing note reviewed.   Constitutional:       General: He is in acute distress.      Appearance: He is not diaphoretic.      Comments: Alert.  He tends to answer questions but he is hard to understand and does not answer well.  He appears tired and cachectic.  Disheveled.   HENT:      Head: Atraumatic.      Mouth/Throat:      " Comments: Poor dentition throughout.  Dry.  Eyes:      General: No scleral icterus.     Extraocular Movements: Extraocular movements intact.      Pupils: Pupils are equal, round, and reactive to light.   Neck:      Musculoskeletal: Normal range of motion and neck supple.   Cardiovascular:      Rate and Rhythm: Normal rate and regular rhythm.      Heart sounds: Normal heart sounds.   Pulmonary:      Effort: No respiratory distress.      Breath sounds: Normal breath sounds.   Abdominal:      General: Bowel sounds are normal.      Palpations: Abdomen is soft.      Comments: Tender throughout the abdomen.  Flat.  No distention.  No organomegaly or mass obvious.   Musculoskeletal: Normal range of motion.         General: No tenderness.   Skin:     General: Skin is warm.      Findings: No rash.   Neurological:      Mental Status: He is alert. He is confused.      Cranial Nerves: No cranial nerve deficit.      Comments: No focal neurological deficits are obvious.   Psychiatric:         Behavior: Behavior normal.         ED COURSE and [facility]   2011.  Work-up here suggests urinary tract infection with sepsis.  Antibiotics started early.  Fluid bolus provided.  The patient will be admitted to the hospital service here.  Awaiting callback from them.    Sepsis Documentation    1630 Concern for infection:  1630    1730 Sepsis diagnosis confirmed.  There is evidence of organ dysfunction related to the    body's dysregulated response to infection:    Lactic Acid > 2.0 and Acute encephalopathy due to sepsis    Initial lactic acid result is documented above.    Broad spectrum antibiotics were given after blood cultures.      Anti-infectives (From now, onward)    Start     Dose/Rate Route Frequency Ordered Stop    06/08/20 1700  levofloxacin (LEVAQUIN) infusion 750 mg      750 mg  100 mL/hr over 90 Minutes Intravenous EVERY 24 HOURS 06/08/20 0201      06/08/20 0800  hydroxychloroquine (PLAQUENIL) tablet 200 mg      200 mg Oral EVERY  MORNING 06/08/20 0149            Initial fluid bolus: 2.25 L NS (30 ml/kg)    Vasopressors: none    none Repeat lactic acid result is documented above.      LABS  Labs Ordered and Resulted from Time of ED Arrival Up to the Time of Departure from the ED   CBC WITH PLATELETS DIFFERENTIAL - Abnormal; Notable for the following components:       Result Value    WBC 12.0 (*)     RBC Count 4.18 (*)     Hemoglobin 12.9 (*)     Hematocrit 39.7 (*)     Absolute Neutrophil 10.4 (*)     Absolute Lymphocytes 0.7 (*)     All other components within normal limits   COMPREHENSIVE METABOLIC PANEL - Abnormal; Notable for the following components:    Sodium 153 (*)     Potassium 3.1 (*)     Chloride 117 (*)     Glucose 303 (*)     Urea Nitrogen 45 (*)     Creatinine 1.32 (*)     GFR Estimate 48 (*)     GFR Estimate If Black 56 (*)     Albumin 2.6 (*)     All other components within normal limits   ROUTINE UA WITH MICROSCOPIC REFLEX TO CULTURE - Abnormal; Notable for the following components:    Blood Urine Large (*)     Protein Albumin Urine 100 (*)     Leukocyte Esterase Urine Large (*)     WBC Urine >182 (*)     RBC Urine 24 (*)     Bacteria Urine Few (*)     All other components within normal limits   LACTIC ACID WHOLE BLOOD - Abnormal; Notable for the following components:    Lactic Acid 2.3 (*)     All other components within normal limits   BLOOD GAS VENOUS - Abnormal; Notable for the following components:    Ph Venous 7.45 (*)     PO2 Venous 17 (*)     Bicarbonate Venous 29 (*)     All other components within normal limits   COVID-19 VIRUS (CORONAVIRUS) BY PCR   SARS-COV-2 (COVID-19) VIRUS RT-PCR       IMAGING  Images reviewed by me.  Radiology report also reviewed.  CT Chest/Abdomen/Pelvis w Contrast   Final Result   IMPRESSION:   1.  No acute abnormality in the chest.   2.  Stable focal density in the left upper lobe unchanged compared to 03/29/2019. Depending on the clinical picture, recommend continued surveillance in 12  months.     3.  Severe cystitis suggested please correlate clinically. Mild bilateral hydronephrosis and hydroureter without obstructing stone.             Procedures    Medications   glucose gel 15-30 g (has no administration in time range)     Or   dextrose 50 % injection 25-50 mL (has no administration in time range)     Or   glucagon injection 1 mg (has no administration in time range)   insulin aspart (NovoLOG) injection (RAPID ACTING) (2 Units Subcutaneous Given 6/9/20 1740)   insulin aspart (NovoLOG) injection (RAPID ACTING) (1 Units Subcutaneous Not Given 6/8/20 2126)   lidocaine 1 % 0.1-1 mL (has no administration in time range)   lidocaine (LMX4) kit (has no administration in time range)   sodium chloride (PF) 0.9% PF flush 3 mL (has no administration in time range)   sodium chloride (PF) 0.9% PF flush 3 mL (3 mLs Intracatheter Not Given 6/9/20 1626)   naloxone (NARCAN) injection 0.1-0.4 mg (has no administration in time range)   melatonin tablet 1 mg (1 mg Oral Given 6/8/20 2145)   acetaminophen (TYLENOL) Suppository 650 mg (has no administration in time range)   acetaminophen (TYLENOL) tablet 650 mg (has no administration in time range)   polyethylene glycol (MIRALAX) Packet 17 g (has no administration in time range)   bisacodyl (DULCOLAX) EC tablet 5 mg (has no administration in time range)     Or   bisacodyl (DULCOLAX) EC tablet 10 mg (has no administration in time range)     Or   bisacodyl (DULCOLAX) EC tablet 15 mg (has no administration in time range)   prochlorperazine (COMPAZINE) injection 5 mg (has no administration in time range)     Or   prochlorperazine (COMPAZINE) tablet 5 mg (has no administration in time range)     Or   prochlorperazine (COMPAZINE) Suppository 12.5 mg (has no administration in time range)   ondansetron (ZOFRAN-ODT) ODT tab 4 mg (has no administration in time range)     Or   ondansetron (ZOFRAN) injection 4 mg (has no administration in time range)   ferrous sulfate (FEROSUL)  tablet 325 mg (325 mg Oral Given 6/9/20 0839)   folic acid (FOLVITE) tablet 1 mg (1 mg Oral Given 6/9/20 0840)   glipiZIDE (GLUCOTROL) tablet 5 mg (5 mg Oral Given 6/9/20 0839)   mirtazapine (REMERON) tablet TABS 7.5 mg (7.5 mg Oral Given 6/8/20 2145)   miconazole (MICATIN) 2 % powder ( Topical Given 6/9/20 1942)   predniSONE (DELTASONE) tablet 7.5 mg (7.5 mg Oral Given 6/9/20 0841)   tamsulosin (FLOMAX) capsule 0.4 mg (0.4 mg Oral Given 6/9/20 0839)   hydroxychloroquine (PLAQUENIL) tablet 200 mg (200 mg Oral Given 6/9/20 0840)   potassium chloride ER (KLOR-CON M) CR tablet 20-40 mEq ( Oral Canceled Entry 6/8/20 1714)   potassium chloride (KLOR-CON) Packet 20-40 mEq (20 mEq Oral or Feeding Tube Given 6/8/20 1703)   potassium chloride 10 mEq in 100 mL sterile water intermittent infusion (premix) (has no administration in time range)   potassium chloride 10 mEq in 100 mL intermittent infusion with 10 mg lidocaine (10 mEq Intravenous New Bag 6/8/20 1051)   levofloxacin (LEVAQUIN) infusion 750 mg (750 mg Intravenous New Bag 6/9/20 1643)   artificial saliva (BIOTENE MT) solution 2 spray (has no administration in time range)   0.45% sodium chloride + KCl 20 mEq/L infusion ( Intravenous New Bag 6/9/20 1508)   magnesium sulfate 4 g in 100 mL sterile water (premade) (has no administration in time range)   insulin glargine (LANTUS PEN) injection 14 Units (14 Units Subcutaneous Given 6/9/20 0841)   lisinopril (ZESTRIL) tablet 10 mg (10 mg Oral Given 6/9/20 1142)   0.9% sodium chloride BOLUS (0 mLs Intravenous Stopped 6/7/20 2028)   iopamidol (ISOVUE-370) solution 79 mL (79 mLs Intravenous Given 6/7/20 1852)   sodium chloride 0.9 % bag 500mL for CT scan flush use (60 mLs Intravenous Given 6/7/20 1852)   levofloxacin (LEVAQUIN) infusion 750 mg (0 mg Intravenous Stopped 6/7/20 2028)   0.9% sodium chloride BOLUS (750 mLs Intravenous New Bag 6/7/20 2029)   lactated ringers BOLUS 1,000 mL (1,000 mLs Intravenous New Bag 6/8/20 0115)    insulin glargine (LANTUS PEN) injection 3 Units (3 Units Subcutaneous Given 6/8/20 1424)         IMPRESSION       ICD-10-CM    1. Sepsis with encephalopathy without septic shock, due to unspecified organism (H)  A41.9     R65.20     G93.40    2. Acute cystitis without hematuria  N30.00               Medication List      ASK your doctor about these medications    multivitamin Tabs per tablet  Ask about: Which instructions should I use?                          Jaxon Feliciano MD  06/09/20 1943

## 2020-06-08 ENCOUNTER — APPOINTMENT (OUTPATIENT)
Dept: PHYSICAL THERAPY | Facility: CLINIC | Age: 85
DRG: 919 | End: 2020-06-08
Payer: MEDICARE

## 2020-06-08 PROBLEM — G93.41 SEPTIC ENCEPHALOPATHY: Status: ACTIVE | Noted: 2020-06-08

## 2020-06-08 PROBLEM — N39.0 UTI (URINARY TRACT INFECTION): Status: RESOLVED | Noted: 2019-09-21 | Resolved: 2020-06-08

## 2020-06-08 PROBLEM — N30.00 ACUTE CYSTITIS WITHOUT HEMATURIA: Status: ACTIVE | Noted: 2020-06-08

## 2020-06-08 LAB
ALBUMIN SERPL-MCNC: 2 G/DL (ref 3.4–5)
ALP SERPL-CCNC: 81 U/L (ref 40–150)
ALT SERPL W P-5'-P-CCNC: 21 U/L (ref 0–70)
ANION GAP SERPL CALCULATED.3IONS-SCNC: 6 MMOL/L (ref 3–14)
AST SERPL W P-5'-P-CCNC: 19 U/L (ref 0–45)
BASOPHILS # BLD AUTO: 0 10E9/L (ref 0–0.2)
BASOPHILS NFR BLD AUTO: 0.2 %
BILIRUB SERPL-MCNC: 0.4 MG/DL (ref 0.2–1.3)
BUN SERPL-MCNC: 40 MG/DL (ref 7–30)
CALCIUM SERPL-MCNC: 8.9 MG/DL (ref 8.5–10.1)
CHLORIDE SERPL-SCNC: 121 MMOL/L (ref 94–109)
CO2 SERPL-SCNC: 29 MMOL/L (ref 20–32)
CREAT SERPL-MCNC: 1.11 MG/DL (ref 0.66–1.25)
DIFFERENTIAL METHOD BLD: ABNORMAL
EOSINOPHIL # BLD AUTO: 0 10E9/L (ref 0–0.7)
EOSINOPHIL NFR BLD AUTO: 0.1 %
ERYTHROCYTE [DISTWIDTH] IN BLOOD BY AUTOMATED COUNT: 14 % (ref 10–15)
GFR SERPL CREATININE-BSD FRML MDRD: 60 ML/MIN/{1.73_M2}
GLUCOSE BLDC GLUCOMTR-MCNC: 117 MG/DL (ref 70–99)
GLUCOSE BLDC GLUCOMTR-MCNC: 188 MG/DL (ref 70–99)
GLUCOSE BLDC GLUCOMTR-MCNC: 210 MG/DL (ref 70–99)
GLUCOSE BLDC GLUCOMTR-MCNC: 213 MG/DL (ref 70–99)
GLUCOSE BLDC GLUCOMTR-MCNC: 232 MG/DL (ref 70–99)
GLUCOSE BLDC GLUCOMTR-MCNC: 242 MG/DL (ref 70–99)
GLUCOSE SERPL-MCNC: 226 MG/DL (ref 70–99)
HCT VFR BLD AUTO: 35.1 % (ref 40–53)
HGB BLD-MCNC: 11.2 G/DL (ref 13.3–17.7)
IMM GRANULOCYTES # BLD: 0.1 10E9/L (ref 0–0.4)
IMM GRANULOCYTES NFR BLD: 0.9 %
LYMPHOCYTES # BLD AUTO: 0.9 10E9/L (ref 0.8–5.3)
LYMPHOCYTES NFR BLD AUTO: 9 %
MAGNESIUM SERPL-MCNC: 2.1 MG/DL (ref 1.6–2.3)
MCH RBC QN AUTO: 30.4 PG (ref 26.5–33)
MCHC RBC AUTO-ENTMCNC: 31.9 G/DL (ref 31.5–36.5)
MCV RBC AUTO: 95 FL (ref 78–100)
MONOCYTES # BLD AUTO: 0.8 10E9/L (ref 0–1.3)
MONOCYTES NFR BLD AUTO: 8.4 %
NEUTROPHILS # BLD AUTO: 7.8 10E9/L (ref 1.6–8.3)
NEUTROPHILS NFR BLD AUTO: 81.4 %
NRBC # BLD AUTO: 0 10*3/UL
NRBC BLD AUTO-RTO: 0 /100
PLATELET # BLD AUTO: 169 10E9/L (ref 150–450)
POTASSIUM SERPL-SCNC: 3.1 MMOL/L (ref 3.4–5.3)
POTASSIUM SERPL-SCNC: 3.1 MMOL/L (ref 3.4–5.3)
POTASSIUM SERPL-SCNC: 3.6 MMOL/L (ref 3.4–5.3)
PROT SERPL-MCNC: 6.5 G/DL (ref 6.8–8.8)
RBC # BLD AUTO: 3.68 10E12/L (ref 4.4–5.9)
SODIUM SERPL-SCNC: 156 MMOL/L (ref 133–144)
WBC # BLD AUTO: 9.5 10E9/L (ref 4–11)

## 2020-06-08 PROCEDURE — 00000146 ZZHCL STATISTIC GLUCOSE BY METER IP

## 2020-06-08 PROCEDURE — 97161 PT EVAL LOW COMPLEX 20 MIN: CPT | Mod: GP | Performed by: PHYSICAL THERAPIST

## 2020-06-08 PROCEDURE — 25000132 ZZH RX MED GY IP 250 OP 250 PS 637: Mod: GY | Performed by: PHYSICIAN ASSISTANT

## 2020-06-08 PROCEDURE — 36415 COLL VENOUS BLD VENIPUNCTURE: CPT | Performed by: PHYSICIAN ASSISTANT

## 2020-06-08 PROCEDURE — G0463 HOSPITAL OUTPT CLINIC VISIT: HCPCS

## 2020-06-08 PROCEDURE — 97530 THERAPEUTIC ACTIVITIES: CPT | Mod: GP | Performed by: PHYSICAL THERAPIST

## 2020-06-08 PROCEDURE — 85025 COMPLETE CBC W/AUTO DIFF WBC: CPT | Performed by: PHYSICIAN ASSISTANT

## 2020-06-08 PROCEDURE — 99232 SBSQ HOSP IP/OBS MODERATE 35: CPT | Performed by: INTERNAL MEDICINE

## 2020-06-08 PROCEDURE — 25000128 H RX IP 250 OP 636: Performed by: INTERNAL MEDICINE

## 2020-06-08 PROCEDURE — 25000131 ZZH RX MED GY IP 250 OP 636 PS 637: Mod: GY | Performed by: INTERNAL MEDICINE

## 2020-06-08 PROCEDURE — 12000000 ZZH R&B MED SURG/OB

## 2020-06-08 PROCEDURE — 25800030 ZZH RX IP 258 OP 636: Performed by: PHYSICIAN ASSISTANT

## 2020-06-08 PROCEDURE — 25000131 ZZH RX MED GY IP 250 OP 636 PS 637: Mod: GY | Performed by: PHYSICIAN ASSISTANT

## 2020-06-08 PROCEDURE — 36415 COLL VENOUS BLD VENIPUNCTURE: CPT | Performed by: INTERNAL MEDICINE

## 2020-06-08 PROCEDURE — 80053 COMPREHEN METABOLIC PANEL: CPT | Performed by: PHYSICIAN ASSISTANT

## 2020-06-08 PROCEDURE — 83735 ASSAY OF MAGNESIUM: CPT | Performed by: INTERNAL MEDICINE

## 2020-06-08 PROCEDURE — 84132 ASSAY OF SERUM POTASSIUM: CPT | Performed by: INTERNAL MEDICINE

## 2020-06-08 PROCEDURE — 25000128 H RX IP 250 OP 636: Performed by: PHYSICIAN ASSISTANT

## 2020-06-08 RX ORDER — FOLIC ACID 1 MG/1
1 TABLET ORAL DAILY
COMMUNITY

## 2020-06-08 RX ORDER — CEFUROXIME AXETIL 250 MG/1
250 TABLET ORAL 2 TIMES DAILY
Status: ON HOLD | COMMUNITY
End: 2020-06-10

## 2020-06-08 RX ORDER — PREDNISONE 2.5 MG/1
7.5 TABLET ORAL
Status: DISCONTINUED | OUTPATIENT
Start: 2020-06-08 | End: 2020-06-11 | Stop reason: HOSPADM

## 2020-06-08 RX ORDER — TAMSULOSIN HYDROCHLORIDE 0.4 MG/1
0.4 CAPSULE ORAL DAILY
Status: DISCONTINUED | OUTPATIENT
Start: 2020-06-08 | End: 2020-06-11 | Stop reason: HOSPADM

## 2020-06-08 RX ORDER — POTASSIUM CHLORIDE 1.5 G/1.58G
20-40 POWDER, FOR SOLUTION ORAL
Status: DISCONTINUED | OUTPATIENT
Start: 2020-06-08 | End: 2020-06-11 | Stop reason: HOSPADM

## 2020-06-08 RX ORDER — FERROUS SULFATE 325(65) MG
325 TABLET ORAL DAILY
Status: DISCONTINUED | OUTPATIENT
Start: 2020-06-08 | End: 2020-06-11 | Stop reason: HOSPADM

## 2020-06-08 RX ORDER — TRIAMCINOLONE ACETONIDE 1 MG/G
CREAM TOPICAL 2 TIMES DAILY PRN
COMMUNITY

## 2020-06-08 RX ORDER — MAGNESIUM SULFATE HEPTAHYDRATE 40 MG/ML
4 INJECTION, SOLUTION INTRAVENOUS EVERY 4 HOURS PRN
Status: DISCONTINUED | OUTPATIENT
Start: 2020-06-08 | End: 2020-06-11

## 2020-06-08 RX ORDER — LEVOFLOXACIN 5 MG/ML
750 INJECTION, SOLUTION INTRAVENOUS EVERY 24 HOURS
Status: DISCONTINUED | OUTPATIENT
Start: 2020-06-08 | End: 2020-06-11 | Stop reason: HOSPADM

## 2020-06-08 RX ORDER — ACETAMINOPHEN 325 MG/1
650 TABLET ORAL EVERY 4 HOURS PRN
Status: DISCONTINUED | OUTPATIENT
Start: 2020-06-08 | End: 2020-06-08

## 2020-06-08 RX ORDER — I-VITE, TAB 1000-60-2MG (60/BT) 300MCG-200
1 TAB ORAL DAILY
COMMUNITY

## 2020-06-08 RX ORDER — POTASSIUM CHLORIDE 29.8 MG/ML
20 INJECTION INTRAVENOUS
Status: DISCONTINUED | OUTPATIENT
Start: 2020-06-08 | End: 2020-06-08 | Stop reason: CLARIF

## 2020-06-08 RX ORDER — LANOLIN ALCOHOL/MO/W.PET/CERES
3 CREAM (GRAM) TOPICAL DAILY
COMMUNITY

## 2020-06-08 RX ORDER — POTASSIUM CHLORIDE 7.45 MG/ML
10 INJECTION INTRAVENOUS
Status: DISCONTINUED | OUTPATIENT
Start: 2020-06-08 | End: 2020-06-11 | Stop reason: HOSPADM

## 2020-06-08 RX ORDER — FOLIC ACID 1 MG/1
1 TABLET ORAL DAILY
Status: DISCONTINUED | OUTPATIENT
Start: 2020-06-08 | End: 2020-06-11 | Stop reason: HOSPADM

## 2020-06-08 RX ORDER — GLIPIZIDE 5 MG/1
5 TABLET ORAL
Status: DISCONTINUED | OUTPATIENT
Start: 2020-06-08 | End: 2020-06-11 | Stop reason: HOSPADM

## 2020-06-08 RX ORDER — MIRTAZAPINE 7.5 MG/1
7.5 TABLET, FILM COATED ORAL AT BEDTIME
Status: DISCONTINUED | OUTPATIENT
Start: 2020-06-08 | End: 2020-06-11 | Stop reason: HOSPADM

## 2020-06-08 RX ORDER — GLIPIZIDE 5 MG/1
TABLET ORAL DAILY
COMMUNITY

## 2020-06-08 RX ORDER — HYDROXYCHLOROQUINE SULFATE 200 MG/1
200 TABLET, FILM COATED ORAL EVERY MORNING
Status: DISCONTINUED | OUTPATIENT
Start: 2020-06-08 | End: 2020-06-11 | Stop reason: HOSPADM

## 2020-06-08 RX ORDER — POTASSIUM CHLORIDE 1500 MG/1
20-40 TABLET, EXTENDED RELEASE ORAL
Status: DISCONTINUED | OUTPATIENT
Start: 2020-06-08 | End: 2020-06-11 | Stop reason: HOSPADM

## 2020-06-08 RX ORDER — SODIUM CHLORIDE AND POTASSIUM CHLORIDE 150; 450 MG/100ML; MG/100ML
INJECTION, SOLUTION INTRAVENOUS CONTINUOUS
Status: DISCONTINUED | OUTPATIENT
Start: 2020-06-08 | End: 2020-06-10

## 2020-06-08 RX ORDER — SALIVA STIMULANT COMB. NO.3
2 SPRAY, NON-AEROSOL (ML) MUCOUS MEMBRANE
Status: DISCONTINUED | OUTPATIENT
Start: 2020-06-08 | End: 2020-06-11 | Stop reason: HOSPADM

## 2020-06-08 RX ORDER — POTASSIUM CL/LIDO/0.9 % NACL 10MEQ/0.1L
10 INTRAVENOUS SOLUTION, PIGGYBACK (ML) INTRAVENOUS
Status: DISCONTINUED | OUTPATIENT
Start: 2020-06-08 | End: 2020-06-11 | Stop reason: HOSPADM

## 2020-06-08 RX ADMIN — GLIPIZIDE 5 MG: 5 TABLET ORAL at 08:55

## 2020-06-08 RX ADMIN — POTASSIUM CHLORIDE 40 MEQ: 20 TABLET, EXTENDED RELEASE ORAL at 14:51

## 2020-06-08 RX ADMIN — Medication 10 MEQ: at 05:09

## 2020-06-08 RX ADMIN — SODIUM CHLORIDE, POTASSIUM CHLORIDE, SODIUM LACTATE AND CALCIUM CHLORIDE 1000 ML: 600; 310; 30; 20 INJECTION, SOLUTION INTRAVENOUS at 01:15

## 2020-06-08 RX ADMIN — POTASSIUM CHLORIDE AND SODIUM CHLORIDE: 450; 150 INJECTION, SOLUTION INTRAVENOUS at 14:24

## 2020-06-08 RX ADMIN — Medication 1 MG: at 21:45

## 2020-06-08 RX ADMIN — TAMSULOSIN HYDROCHLORIDE 0.4 MG: 0.4 CAPSULE ORAL at 08:55

## 2020-06-08 RX ADMIN — PREDNISONE 7.5 MG: 2.5 TABLET ORAL at 08:55

## 2020-06-08 RX ADMIN — SODIUM CHLORIDE, POTASSIUM CHLORIDE, SODIUM LACTATE AND CALCIUM CHLORIDE: 600; 310; 30; 20 INJECTION, SOLUTION INTRAVENOUS at 03:33

## 2020-06-08 RX ADMIN — Medication 10 MEQ: at 08:55

## 2020-06-08 RX ADMIN — INSULIN GLARGINE 10 UNITS: 100 INJECTION, SOLUTION SUBCUTANEOUS at 09:04

## 2020-06-08 RX ADMIN — FERROUS SULFATE TAB 325 MG (65 MG ELEMENTAL FE) 325 MG: 325 (65 FE) TAB at 08:55

## 2020-06-08 RX ADMIN — Medication 10 MEQ: at 06:20

## 2020-06-08 RX ADMIN — POTASSIUM CHLORIDE 20 MEQ: 1.5 POWDER, FOR SOLUTION ORAL at 17:03

## 2020-06-08 RX ADMIN — HYDROXYCHLOROQUINE SULFATE 200 MG: 200 TABLET, FILM COATED ORAL at 08:55

## 2020-06-08 RX ADMIN — FOLIC ACID 1 MG: 1 TABLET ORAL at 08:55

## 2020-06-08 RX ADMIN — INSULIN ASPART 5 UNITS: 100 INJECTION, SOLUTION INTRAVENOUS; SUBCUTANEOUS at 12:02

## 2020-06-08 RX ADMIN — MIRTAZAPINE 7.5 MG: 7.5 TABLET, FILM COATED ORAL at 21:45

## 2020-06-08 RX ADMIN — LEVOFLOXACIN 750 MG: 5 INJECTION, SOLUTION INTRAVENOUS at 16:58

## 2020-06-08 RX ADMIN — INSULIN GLARGINE 3 UNITS: 100 INJECTION, SOLUTION SUBCUTANEOUS at 14:24

## 2020-06-08 RX ADMIN — POTASSIUM CHLORIDE AND SODIUM CHLORIDE: 450; 150 INJECTION, SOLUTION INTRAVENOUS at 22:21

## 2020-06-08 RX ADMIN — Medication 10 MEQ: at 10:51

## 2020-06-08 RX ADMIN — INSULIN ASPART 4 UNITS: 100 INJECTION, SOLUTION INTRAVENOUS; SUBCUTANEOUS at 08:56

## 2020-06-08 RX ADMIN — MICONAZOLE NITRATE: 20 POWDER TOPICAL at 08:56

## 2020-06-08 ASSESSMENT — MIFFLIN-ST. JEOR: SCORE: 1408.75

## 2020-06-08 ASSESSMENT — ACTIVITIES OF DAILY LIVING (ADL)
ADLS_ACUITY_SCORE: 33
ADLS_ACUITY_SCORE: 27
ADLS_ACUITY_SCORE: 31
ADLS_ACUITY_SCORE: 27
ADLS_ACUITY_SCORE: 27
ADLS_ACUITY_SCORE: 34

## 2020-06-08 NOTE — CONSULTS
CARE TRANSITION SOCIAL WORK INITIAL ASSESSMENT:    Admit date/time:  06/07/20 6492       Met with: SW met with pt's son, Cheri, via phone and also called [facility] (Main: [phone] Fax: [phone]). RNNicky at [phone] at [facility]. SW introduced self, title & role.    Sw learned that pt resides in the memory care unit at [facility] & receives assistance with all cares.  At baseline, pt is up with assist of 1 and uses a walker.    PT states pt is able to return to [facility] upon discharge.     Nicky states they are unable to provide Home Care services at this time due to Covid, but requests exercise instructions/sheets so that staff can assist pt with strength until Home Care services allowed in the building.      DATA  Principal Problem:    Sepsis (H)  Active Problems:    ASCVD (arteriosclerotic cardiovascular disease)    Diabetes mellitus, type 2 (H)    Hyperlipidemia    Hypertension    Rheumatoid arthritis (H)    Urinary tract infection associated with indwelling urinary catheter (H)    Immunosuppressed status (H)    Acute cystitis without hematuria    Septic encephalopathy      Contact information and PCP information verified: Yes, [facility] in house    ASSESSMENT  Cognitive Status: awake and alert.     Insurance Concerns: No Insurance issues identified     Living Arrangements: [facility] Memory care unit.    CTS discussed transportation options with sonCheri.  Discussed vendor, mode of transportation & anticipated private pay costs.  Cheri agree to private pay costs associated with [facility] (Ph: [phone]) services.    PLAN:    Pt to return to [facility] Memory Care unit via [facility] wheelchair transportation upon discharge.    ILIA Lazcano  [facility] [phone]   [facility]  [phone]

## 2020-06-08 NOTE — PROGRESS NOTES
Discussed COVid Neg results with ANITA Adler prior to arrival to the floor and OK to discontinue precautions and admit to a reg room.

## 2020-06-08 NOTE — CONSULTS
"CLINICAL NUTRITION SERVICES  -  ASSESSMENT NOTE      RECOMMENDATIONS FOR MD/PROVIDER TO ORDER:   ADAT   Recommendations Ordered by Registered Dietitian (RD):   Boost Breeze TID with meals   Future/Additional Recommendations:   As diet advances, consider changing ONS to Boost Plus or Boost Very High Calorie pending oral intake.  Daily weights as ordered  Continue to monitor and encourage oral inake  RD to monitor for WOC RN note to determine additional nutrition interventions   Malnutrition: Severe malnutrition  In Context of:  Acute illness or injury  Chronic illness or disease        REASON FOR ASSESSMENT  Maxime Powell is a 86 year old male seen by Registered Dietitian for Admission Nutrition Risk Screen for stageable pressure injuries or large/non-healing wound or burn and Provider Order - weight loss, cachecia and RN Consult - \"underweight, possible pressure injury\"      NUTRITION HISTORY  Information obtained from EMR:  -presented from [facility] for evaluation of increased confusion and concern for infection. Found to have sepsis 2/2 UTI (UTI positive on 6/6)  -hospital problem list: ROBERT (admit creatinine 1.32 compared to 0.7-1.0 at baseline, and GFR 48 compared to 65-82 at baseline); hypernatremia, hypochloridemia, acute metabolic alkalosis, leukocytosis  -PMH: Murcia catheter at baseline, type 2 diabetes (A1c 7.5 managed PTA w/ Lantus and metformin), HTN, hyperlipidemia, arteriosclerotic cardiovascular disease, and rheumatoid arthritis on prednisone  -negative for COVID-19 6/7/20  -WOC RN consulted for possible pressure injury left buttocks  -lives in a memory care unit at [facility]    Information obtained from Nicky RN at [facility]:  -likes everything - not picky. Does like yogurt at breakfast  -regular diet w/ thin liquids; normally eats 3 meals/day (%) w/ good appetite  -decrease in appetite started around 6/3-6/4 d/t sepsis and UTI  -admitted to [facility] August 2019.      CURRENT NUTRITION " "ORDERS  Diet Order:     Orders Placed This Encounter      Advance Diet as Tolerated: Clear Liquid Diet      Current Intake/Tolerance:  -ate 75% of breakfast this morning  -will send Boost Breeze TID while on CL diet      NUTRITION FOCUSED PHYSICAL ASSESSMENT FOR DIAGNOSING MALNUTRITION)  Yes              Observed:    Muscle wasting (refer to documentation in Malnutrition section) and Subcutaneous fat loss (refer to documentation in Malnutrition section). When asked if RD could assess lower extremities, pt declined.    Obtained from Chart/Interdisciplinary Team:  Appears severely cachectic; severely diminished muscle bulk and tone per H&P    ANTHROPOMETRICS  Height: 6' 2\"  Weight: 145 lbs 4.53 oz  Body mass index is 18.65 kg/m .  Weight Status:  Normal BMI  IBW: 190 lbs  % IBW: 76%  Weight History:   Wt Readings from Last 10 Encounters:   20 65.9 kg (145 lb 4.5 oz)   19 73 kg (161 lb)   19 78.9 kg (174 lb)   19 78.9 kg (173 lb 15.1 oz)   19 71.2 kg (156 lb 14.4 oz)   19 74.1 kg (163 lb 6.4 oz)   18 77.1 kg (170 lb)   16 77.1 kg (170 lb)   08/19/15 87.1 kg (192 lb)   13 83 kg (183 lb)   -per care everywhere:   138 lbs on 19  -based on above, pt has lost 29 lbs (17%) in the last 12 months, which is not clinically significant.  -Nicky reports pt is weighed sporadically throughout the month. -155 lbs; wt has been stable until May 2020 when it dropped to 142 lbs.    May 2020 142 lbs   2020 158 lbs   2019 154 lbs  -appears pt lost 13 lbs (8.2%) since 2020, which is clinically significant.    Vitals:    20 2238 20 0307   Weight: 66.4 kg (146 lb 6.2 oz) 65.9 kg (145 lb 4.5 oz)       LABS  Na 156 (H)  K+ 3.1 (L)  Chloride 121 (H)  BUN 40 (H)  Creatinine 1.11 (WNL)  GFR 60 (L)  B, 213, and 226 so far today    MEDICATIONS  Ferrous sulfate  Folic acid  Glipizide every morning before breakfast  novolog at bedtime and TID before " meals  lantus every morning  remeron  IVF: LR @ 125 mL/hr    ASSESSED NUTRITION NEEDS PER APPROVED PRACTICE GUIDELINES:    Dosing Weight 66 kg (current wt)  Estimated Energy Needs: 1935-4756 kcals (30-35 Kcal/Kg)  Justification: possible pressure injury, borderline underweight BMI, and wt loss  Estimated Protein Needs: 66-79 grams protein (1-1.2 g pro/Kg)  Justification: appropriate for age, possible pressure injury but unable to confirm at this time  Estimated Fluid Needs: (1 mL/Kcal)  Justification: maintenance    MALNUTRITION:  % Weight Loss:  > 5% in 1 month (severe malnutrition)  % Intake:  </= 75% for >/= 1 month (severe malnutrition) - while pt is eating well at baseline, suspect still not meeting needs with recent wt loss.  Subcutaneous Fat Loss:  Upper arm region at least moderate depletion  Muscle Loss:  Temporal region severe depletion, Clavicle bone region moderate depletion, Acromion bone region severe depletion and Dorsal hand region at least moderate depletion  Fluid Retention:  None noted    Malnutrition Diagnosis: Severe malnutrition  In Context of:  Acute illness or injury  Chronic illness or disease    NUTRITION DIAGNOSIS:  Inadequate oral intake related to acute encephalopathy 2/2 UTI now septic as evidenced by poor oral intake for 5 days, wt loss of 13 lbs (8.2%) in the last ~ 2 months, and moderate to severe subcutaneous fat and muscle loss.      NUTRITION INTERVENTIONS  Recommendations / Nutrition Prescription  ADAT  Boost Breeze TID with meals  As diet advances, consider changing ONS to Boost Plus or Boost Very High Calorie pending oral intake.  Daily weights as ordered  Continue to monitor and encourage oral inake  RD to monitor for WOC RN note to determine additional nutrition interventions      Implementation  Nutrition education: Not appropriate at this time due to patient condition  Composition of Meals and Snacks, Medical Food Supplement: see above   Collaboration and Referral of  Nutrition care: Pt POC discussed during IDT rounds this morning.      Nutrition Goals  Pt to consume >/= 75% of meals TID        MONITORING AND EVALUATION:  Progress towards goals will be monitored and evaluated per protocol and Practice Guidelines              Anisha Golden RDN, LD  Clinical Dietitian  [phone]

## 2020-06-08 NOTE — PROGRESS NOTES
Skin affirmation note    Admitting nurse completed full skin assessment, Wilfred score and Wilfred interventions. This writer agrees with the initial skin assessment findings.

## 2020-06-08 NOTE — PROGRESS NOTES
WO Nurse Inpatient Wound Assessment   Reason for consultation: buttock wound     Assessment  Buttock and hip wounds due to Pressure Injury   Right hip resolving stage II, buttock stage II - present on admission  Status: initial assessment    Treatment Plan  Buttock wound: Monday/Thursday  And as needed, cleanse area gently with soap and water, rinse and dry.   Cover right hip with Mepilex border 3x3 and coccyx/sacral area with Mepilex border sacral bandage.  Apply skin barrier wipe (Sureprep or no-sting) to edge of tape after application to help prevent rolling of bandage.  Use chair cushion when up.  Reposition every 2 hours, avoid laying completely on side (slight turn propped with pillow,) if able as puts a lot of pressure on bony hips, pillows between bony prominences.  Heels floating.  HOB less than 30 as able.  Orders Written   Nutrition consult has been ordered  Recommended provider order: none  WOC Nurse follow-up plan:signing off  Nursing to notify the Provider(s) and re-consult the WOC Nurse if wound(s) deteriorates or new skin concern.    Patient History  According to provider note(s):  Pt admitted with UTI with sepsis.  Multiple co-morbidities including DM II, RA on Humara, plaquenil and methotrexate, and severe cachetic.    Pt is from [facility]      Objective Data  Containment of urine/stool: wearing brief, fecal continence status unknown    Active Diet Order  Orders Placed This Encounter      Advance Diet as Tolerated: Clear Liquid Diet      Output:   I/O last 3 completed shifts:  In: -   Out: 1500 [Urine:1500]    Risk Assessment:   Sensory Perception: 3-->slightly limited  Moisture: 3-->occasionally moist  Activity: 2-->chairfast  Mobility: 3-->slightly limited  Nutrition: 2-->probably inadequate  Friction and Shear: 2-->potential problem  Wilfred Score: 15                          Labs:   Recent Labs   Lab 06/08/20  0451 06/07/20  5909   ALBUMIN 2.0*  --    HGB 11.2*  --    WBC 9.5  --    A1C  --  7.5*        Physical Exam  Skin inspection: focused buttocks   Mepilex bandages are in place and appropriate for padding/protection.    Wound Location:  Right hip and left buttock near coccyx  Right hip with 3x2.5cm area with very thin walled deflated intact blister, is slightly pink and blanching  5mm pink based open area left buttock near coccyx- no drainage noted, periwound intact  Interventions  Current support surface: Standard  Atmos Air mattress  Current off-loading measures: Foam padding and Pillows  Visual inspection and assessment completed   Wound Care: Mepilex bandages in place appear clean, were lifted to assess areas then reapplied.    Education provided: pt with confusion, not appropriate    Cinthia Chanel RN, CWOCN

## 2020-06-08 NOTE — PROGRESS NOTES
Patient up to chair with assist x 2. Eating well, feeds himself. Denies pain, alarms on for safety. IV antibiotics given per MAR.

## 2020-06-08 NOTE — PROGRESS NOTES
"[facility] ADMISSION NOTE    Patient admitted to room 2305 at approximately 2300 via cart from emergency room. Patient was accompanied by transport tech.     Verbal SBAR report received from [place] prior to patient arrival.     Patient trasferred to bed via air osmar. Patient alert and oriented X 1. Pain is controlled without any medications.  . Admission vital signs: Blood pressure (!) 163/75, pulse 88, temperature 97  F (36.1  C), temperature source Oral, resp. rate 16, height 1.88 m (6' 2\"), weight 65.9 kg (145 lb 4.5 oz), SpO2 99 %. Patient was oriented to plan of care, call light, bed controls, tv, telephone, bathroom and visiting hours.     Risk Assessment    The following safety risks were identified during admission: fall. Yellow risk band applied: YES.     Skin Initial Assessment    This writer admitted this patient and completed a full skin assessment and Wilfred score in the Adult PCS flowsheet. Appropriate interventions initiated as needed.     Secondary skin check completed by Clara NICOLAS.    Wilfred Risk Assessment  Sensory Perception: 3-->slightly limited  Moisture: 3-->occasionally moist  Activity: 2-->chairfast  Mobility: 3-->slightly limited  Nutrition: 2-->probably inadequate  Friction and Shear: 2-->potential problem  Wilfred Score: 15  Bed Support Surface: Atmos Air mattress    Education    Patient has a [place] to Observation order: NO       Observation education completed and documented: DUNCAN Manuel RN    "

## 2020-06-08 NOTE — H&P
"[facility]    History and Physical - Hospitalist Service       Date of Admission:  6/7/2020    Assessment & Plan   Maxime Powell is a 86 year old male admitted on 6/7/2020. He presented to the emergency department from assisted living facility for evaluation of increased confusion and concern for infection; he was found to have sepsis secondary to a UTI for which he is being admitted for further evaluation and treatment.    Sepsis  Urinary tract infection associated with indwelling urinary catheter (H)  Acute cystitis without hematuria  Presented with leukocytosis with left shift (WBC 12.0, AND 10.4), encephalopathy, and lactic acid 2.3. UA with large leukocyte esterase and pyuria (WBC > 182). Patient formerly had a Murcia, but pulled it out one day prior to admission (6/6). Was tested for UTI at assisted living facility on 6/6, positive for UTI and started on Keflex. CT chest, abdomen, & pelvis shows \"severe cystitis... mild bilateral hydronephrosis and hydroureter without obstructing stone.\" Was given 1750 ml NS in the emergency department and Levaquin 750 mg IV per sepsis order set.  - Additional 1L LR, then LR @ 125 ml/hr  - Continue Levaquin (per sepsis order set when allergy to PCN), hold prior to admission Keflex  - Blood culture x2 pending  - Urine culture pending  - Consider stress dose steroids (appears to be on prednisone, but needs to be confirmed with assisted living facility)    Med rec pending  Patient came from assisted living facility with medication list, but not the date/time of last dose. List is correct as of 5/5/20.   - HUC to try to obtain med rec information from assisted living facility in am     Urinary retention  Apparently has a Murcia at baseline, which he pulled out on 6.6. Takes Flomax 0.4 mg daily.  - Continue Flomax  - Bladder scan; replace Murcia for > 500 ml urine    Septic encephalopathy  Encephalopathic on admission. Was able to understand " conversation and follow commands, but unable to speak in coherent sentences. Unclear baseline cognitive status. No head CT obtained. Suspect this is related to sepsis and should improve with treatment of infection.  - Neuro checks q 4 hours  - Head CT if cognition worsens    Acute metabolic alkalosis  Hypernatremia  Hypochloridemia   Admit VBG pH 7.45 / pCO2 42 / bicarb 29. Sodium 153, chloride 117. Patient appears significantly dry on exam.  - Rehydrate  - VBG in am  - BMP in am    Acute kidney injury   Admit creatinine 1.32 (baseline 0.7 - 1.0), GFR 48 (baseline 65 - 82). Appears dehydrated, likely prerenal / related to sepsis. Given 1.75L NS in the emergency department.  - Additional 1L LR bolus  - Maintenance LR @ 125 ml/hr  - Hold nephrotoxins  - BMP in am    Leukocytosis   Afebrile. WBC 12.0 with left shift (ANC 10.4). Due to UTI.   - Treat UTI above  - WBC in am    Elevated lactic acid - resolved  Admit lactic acid 2.3, resolved after 1.75 L IV fluids. Due to sepsis.  - No further rechecks    COVID PUI - negative  Assisted living facility was concerned for COVID. Clinically not consistent with COVID, low suspicion.  - COVID negative 6/7/20  - No precautions or re-test needed    Diabetes mellitus, type 2  A1c 7.5. Managed prior to admission with Lantus 10U q am, metformin 500 mg bid.  - Continue Lantus  - Hold metformin due to renal function  - High sliding scale insulin  - Hyper/hypoglycemia protocol    ASCVD (arteriosclerotic cardiovascular disease)  Hyperlipidemia  Hypertension  History of PCI in 2001. Managed prior to admission with lisinopril 10 mg q am. Not on any other cardiac medications prior to admission.   - Hold lisinopril due to renal function, resume as appropriate    Rheumatoid arthritis   Immunosuppressed status   Managed prior to admission with Humira 40 mg subcutaneous every 14 days, Plaquenil 200 mg daily, methotrexate 10 mg every Wednesday, and prednisone 7.5 mg daily.   - Hold Humira and  methotrexate  - Continue Plaquenil and prednisone  - May need stress dose steroids    Weight loss / cachectica  Appears severely cachetic.   - Nutrition consult        Diet: Advance Diet as Tolerated: Clear Liquid Diet    DVT Prophylaxis: Pneumatic Compression Devices  Murcia Catheter: not present  Code Status: DNR/DNI  - POLST reviewed, not discussed with patient due to encephalopathy         Disposition Plan   Expected discharge: 2+ days, recommended to prior living arrangement once SIRS/Sepsis treated.  Entered: Camryn Zavala PA-C 06/08/2020, 1:52 AM     The patient's care was discussed with the Attending Physician, Dr. Augustin Michelle, Bedside Nurse and Patient.    Camryn Zavala PA-C  [facility]    ______________________________________________________________________    Chief Complaint   Confusion, known UTI, pulled Murcia out    History is obtained from EMR, sign out from Dr. Jaxon Feliciano. Patient unable to provide history due to encephalopathy.    History of Present Illness   Maxime Powell is a 86 year old male who presented to the emergency department from [facility] assisted living facility due to confusion.    Patient unable to provide history due to encephalopathy. He appears to understand conversation and follows commands appropriately, but cannot speak in coherent sentences.    Patient lives at an assisted living facility / nursing home. He apparently has a Murcia catheter at baseline. He is able to ambulate at baseline. His baseline cognitive status is unclear.    Patient reportedly was less active and interactive during the day on 6/6. He pulled his Murcia out that day. He was checked for a UTI by his facility staff, found to be positive. He was started on Keflex.     Today he was increasing somnolent, lethargic, and confused. He was mumbling and incoherent. Staff was concerned for possible COVID and sent him to the emergency department.    Patient denies pain, but  moans with palpation of his abdomen. He denies feeling chilled. Is able to state that he is thirsty. The remainder review of systems is not obtained due to confusion.    Review of Systems    Review of systems not obtained due to patient factors - confusion and mental status    Past Medical History    I have reviewed this patient's medical history and updated it with pertinent information if needed.   Past Medical History:   Diagnosis Date     Atherosclerotic heart disease of native coronary artery without angina pectoris     RCA stenting 2001     Essential (primary) hypertension     No Comments Provided     Hyperlipidemia     No Comments Provided     Mononeuropathy of left lower extremity     R/T diabetes     Osteoarthritis     No Comments Provided     Personal history of malignant neoplasm of prostate     1987,radiation     RA (rheumatoid arthritis) (H)      Rheumatoid arthritis (H)     1996     Sjogren-Ashwin syndrome      Strain of muscle, fascia and tendon of long head of biceps, unspecified arm, initial encounter     2003,right     Type 2 diabetes mellitus without complications (H)     2002,Type II     UTI (urinary tract infection) 9/21/2019       Past Surgical History   I have reviewed this patient's surgical history and updated it with pertinent information if needed.  Past Surgical History:   Procedure Laterality Date     APPENDECTOMY OPEN      1960     ARTHROSCOPY SHOULDER ROTATOR CUFF REPAIR      2002,right, Jim Chanel MD     ARTHROTOMY WRIST      1986     COLONOSCOPY      2014     HEART CATH, ANGIOPLASTY      2001,right coronary artery     OTHER SURGICAL HISTORY      05/07/2014,[id number],SHOULDER REPLACEMENT,Right,reverse     OTHER SURGICAL HISTORY      85138.0,NM BONE SCAN 3 PHASE,09/06/13 negative for  metastases     OTHER SURGICAL HISTORY      972643,OTHER,no evidence of metastases     OTHER SURGICAL HISTORY      712207,OTHER     OTHER SURGICAL HISTORY      [id number],OTHER,right       Social History    I have reviewed this patient's social history and updated it with pertinent information if needed.  Social History     Tobacco Use     Smoking status: Former Smoker     Years: 30.00     Types: Cigarettes     Last attempt to quit: 1980     Years since quittin.4     Smokeless tobacco: Former User     Types: Chew     Quit date: 2014     Tobacco comment: Quit smoking: quit smoking 33 years ago. Chew's daily   Substance Use Topics     Alcohol use: No     Drug use: No     Types: Other     Comment: Drug use: No       Family History   I have reviewed this patient's family history and updated it with pertinent information if needed.   Family History   Problem Relation Age of Onset     Cancer Father         Cancer,throat cancer     Breast Cancer Sister         Cancer-breast       Prior to Admission Medications   Prior to Admission Medications   Prescriptions Last Dose Informant Patient Reported? Taking?   Artificial Saliva (BIOTENE MOISTURIZING MOUTH MT) Unknown at Unknown time [facility] Yes No   Sig: Take 1 spray by mouth 2 times daily   Multiple Vitamins-Minerals (PRESERVISION AREDS PO) Unknown at Unknown time [facility] Yes No   Sig: Take 1 capsule by mouth daily   acetaminophen (TYLENOL) 325 MG tablet More than a month at Unknown time [facility] Yes No   Sig: Take 650 mg by mouth every 4 hours as needed for mild pain    adalimumab (HUMIRA) 40 MG/0.8ML prefilled syringe kit Unknown at Unknown time [facility] Yes No   Sig: Inject 40 mg Subcutaneous every 14 days   blood glucose (WAVESENSE PRESTO) test strip  Nursing Home Yes No   Sig: by In Vitro route 2 times daily    cephALEXin (KEFLEX) 500 MG capsule   No No   Sig: Take 1 capsule (500 mg) by mouth 3 times daily for 7 days   ferrous sulfate (FE TABS) 325 (65 Fe) MG EC tablet Unknown at Unknown time [facility] Yes No   Sig: Take 325 mg by mouth daily Take with water and meal   folic acid (FOLVITE) 1 MG tablet Unknown at Unknown time  Yes No   Sig:  Take 1 mg by mouth daily   glipiZIDE (GLUCOTROL) 5 MG tablet Unknown at Unknown time  Yes No   Sig: Take by mouth daily Every morning   hydroxychloroquine (PLAQUENIL) 200 MG tablet Unknown at Unknown time Nursing Home No No   Sig: Take 1 tablet (200 mg) by mouth every morning   insulin glargine (LANTUS PEN) 100 UNIT/ML pen Unknown at Unknown time prison Yes No   Sig: Inject 10 Units Subcutaneous every morning   lisinopril (PRINIVIL/ZESTRIL) 20 MG tablet Unknown at Unknown time prison Yes No   Sig: Take 10 mg by mouth every morning    menthol-zinc oxide (CALMOSEPTINE) 0.44-20.6 % OINT ointment Unknown at Unknown time prison Yes No   Sig: Apply topically 2 times daily And as needed   metFORMIN (GLUCOPHAGE) 500 MG tablet Unknown at Unknown time prison Yes No   Sig: Take 500 mg by mouth 2 times daily (with meals)    methotrexate 2.5 MG tablet Unknown at Unknown time prison Yes No   Sig: Take 10 mg by mouth once a week On Wednesday   mirtazapine (REMERON) 15 MG tablet Unknown at Unknown time prison Yes No   Sig: Take 7.5 mg by mouth At Bedtime    nitroFURantoin macrocrystal (MACRODANTIN) 100 MG capsule Unknown at Unknown time prison Yes No   Sig: Take 100 mg by mouth daily    nystatin (NYSTOP) 737930 UNIT/GM external powder Unknown at Unknown time prison Yes No   Sig: Apply topically 2 times daily Until healed   predniSONE (DELTASONE) 5 MG tablet Unknown at Unknown time prison Yes No   Sig: Take 7.5 mg by mouth daily (with breakfast) .   tamsulosin (FLOMAX) 0.4 MG capsule Unknown at Unknown time prison Yes No   Sig: Take 0.4 mg by mouth daily      Facility-Administered Medications: None     Allergies   Allergies   Allergen Reactions     Finasteride Other (See Comments)     Gynecomastia, Required tamoxifen treatment     Penicillins Other (See Comments) and Swelling     Erythema     Gold Rash     Injectable gold       Physical Exam   Vital Signs: Temp: 98.6  F (37   C) Temp src: Axillary BP: (!) 178/91 Pulse: 97 Heart Rate: 102 Resp: 20 SpO2: 98 % O2 Device: None (Room air)    Weight: 146 lbs 6.17 oz    Constitutional: Alert, not oriented. Ill appearing and toxic. Appears to understand conversation and follows commands appropriately, cooperative. Mostly mumbles, but at times speaks in full sentences that are difficult to understand, does not provide appropriate verbal responses to questions asked of him.     Eyes: Eyes are clear, pupils are reactive. No scleral icterus. Extroccular movements intact.     HEENT: Oropharynx is clear but dry, no lesions. Normocephalic, no evidence of cranial trauma.      Cardiovascular: Regular rhythm and rate, normal S1 and S2. No murmur, rubs, or gallops. Peripheral pulses intact bilaterally. Trace lower extremity edema in the feet only.    Respiratory: Barrel chest. Distant lung sounds, but no appreciated wheezes, rhonchi, or crackles.    GI: Soft, non-distended. Tender to palpation, especially lower quadrants, no rebound or guarding. No hepatosplenomegaly or masses appreciated. Normal bowel sounds.     Musculoskeletal: Possible biceps rupture on the right. Severely diminished muscle bulk and tone, cachetic. Distal CMS intact.      Skin: Warm and dry, no rashes or ecchymoses. No mottling of skin.      Neurologic: Patient moves all extremities. Cranial nerves are grossly intact.  is symmetric. Gross strength and sensation are equal bilaterally.    Genitourinary: Deferred      Data   Data reviewed today: I reviewed all medications, new labs and imaging results over the last 24 hours. I personally reviewed the abdominal CT image(s) showing atherosclerotic aorta, distended bladder, hydronephrosis.    Recent Labs   Lab 06/07/20  2258 06/07/20  1704   WBC  --  12.0*   HGB  --  12.9*   MCV  --  95   PLT  --  180   * 153*   POTASSIUM 3.1* 3.1*   CHLORIDE 121* 117*   CO2 28 29   BUN 43* 45*   CR 1.26* 1.32*   ANIONGAP 6 7   LAISHA 8.9 9.8   GLC  229* 303*   ALBUMIN  --  2.6*   PROTTOTAL  --  7.9   BILITOTAL  --  0.5   ALKPHOS  --  91   ALT  --  24   AST  --  25     Recent Results (from the past 24 hour(s))   CT Chest/Abdomen/Pelvis w Contrast    Narrative    EXAM: CT CHEST/ABDOMEN/PELVIS W CONTRAST  LOCATION: [facility]  DATE/TIME: 6/7/2020 6:49 PM    INDICATION:  tender abdomen, poor historian, agitation;  COMPARISON: 02/29/2020 CT abdomen pelvis, 03/29/2019 chest CT for PE.  TECHNIQUE: CT angiogram chest during arterial phase injection IV contrast. 2D and 3D MIP reconstructions were performed by the CT technologist. Dose reduction techniques were used.     TECHNIQUE: CT scan of the abdomen and pelvis was performed following injection of IV contrast. Multiplanar reformats were obtained. Dose reduction techniques were used.    CONTRAST: 79 mL Isovue-[id number]    CT CHEST FINDINGS:  HEART: Mild biatrial enlargement, left greater than right. Trace pericardial effusion. Severe coronary arterial calcification.    LUNGS AND PLEURA: No pulmonary mass or consolidation. Stable focal consolidation within the left upper lobe posteriorly along the major fissure, unchanged compared to 03/29/2019 chest CT. Airways are clear. No pleural effusion or pneumothorax.    MEDIASTINUM: No abnormally enlarged mediastinal or hilar lymph nodes. Small hiatal hernia suggested.    MUSCULOSKELETAL: Prior right shoulder replacement. No suspicious bone lesion.      CT ABDOMEN PELVIS FINDINGS:  HEPATOBILIARY: Normal.    PANCREAS: Normal.    SPLEEN: Normal.    ADRENAL GLANDS: Normal.    KIDNEYS/BLADDER: Mild bilateral hydronephrosis and hydroureter. Marked circumferential bladder wall thickening, urothelial enhancement, and perivesicular inflammatory change. No urinary tract calcification. No suspicious renal mass.    BOWEL: No evidence for bowel obstruction. Appendix not identified.    LYMPH NODES: Normal.    VASCULATURE: Mildly dilated infrarenal abdominal aorta maximally 33  mm.    PELVIC ORGANS: Unremarkable.    OTHER: None.    MUSCULOSKELETAL: No suspicious bone lesion. Severe degenerative change in the lower lumbar spine.        Impression    IMPRESSION:  1.  No acute abnormality in the chest.  2.  Stable focal density in the left upper lobe unchanged compared to 03/29/2019. Depending on the clinical picture, recommend continued surveillance in 12 months.    3.  Severe cystitis suggested please correlate clinically. Mild bilateral hydronephrosis and hydroureter without obstructing stone.

## 2020-06-08 NOTE — PLAN OF CARE
Discharge Planner PT   Patient plan for discharge: unable to gather subjective goals from patient  Current status: needing modA for supine > sit, CGA for sit <> stand, transferred bed > chair with FWW and CGA, ambulated 10 feet in room with FWW and CGA. UPright in chair at end of session with alarms on and all needs in reach.   Barriers to return to prior living situation: mobility, mental status  Recommendations for discharge:  return to memory care unit with 24 hr A and Ax 1 with mobility and transfers  Rationale for recommendations: patient had Ax 1 at baseline prior to admission       Entered by: Rosario Lanier 06/08/2020 8:56 AM

## 2020-06-08 NOTE — PROGRESS NOTES
Patient had bleeding lip some sort of material removed from mouth. Not sure if it was food. Showed Dr. Rod and he assessed patients mouth. No active bleeding noted. Will continue to monitor.

## 2020-06-08 NOTE — PROGRESS NOTES
06/08/20 0825   Quick Adds   Type of Visit Initial PT Evaluation   Living Environment   Lives With facility resident  (memory care unit)   Living Arrangements other (see comments)  (memory care unit)   Home Accessibility no concerns   Transportation Anticipated agency   Living Environment Comment patient resided in a memory care unit. Patient unable to provide any history of previous level of function.    Self-Care   Usual Activity Tolerance fair   Current Activity Tolerance fair   Regular Exercise No   Equipment Currently Used at Home other (see comments)   Activity/Exercise/Self-Care Comment had A x 1 with mobility and transfers at baseline   Functional Level Prior   Ambulation 3-->assistive equipment and person   Transferring 3-->assistive equipment and person   Toileting 3-->assistive equipment and person   Bathing 3-->assistive equipment and person   Prior Functional Level Comment unable to gather prior level of function from patient subjective information, per discussion with social work: [facility] at [facility] with mobility and transfers   General Information   Onset of Illness/Injury or Date of Surgery - Date 06/07/20   Referring Physician Camryn Zavala   Patient/Family Goals Statement patient not stating any goals, just talking about old stories   Pertinent History of Current Problem (include personal factors and/or comorbidities that impact the POC) Maxime Powell is a 86 year old male admitted on 6/7/2020. He presented to the emergency department from assisted living facility for evaluation of increased confusion and concern for infection; he was found to have sepsis secondary to a UTI for which he is being admitted for further evaluation and treatment.   Precautions/Limitations fall precautions   General Info Comments up with assist   Cognitive Status Examination   Orientation person;place   Level of Consciousness alert   Follows Commands and Answers Questions 75% of the time   Personal Safety  "and Judgment impaired   Memory impaired   Integumentary/Edema   Integumentary/Edema Comments no concerns   Posture    Posture Protracted shoulders;Kyphosis   Range of Motion (ROM)   ROM Comment B LE AROM WNL   Strength   Strength Comments hip flexion 4/5 B, knee flexion 4/5 B, knee extension 4/5 B, ankle DF 4+/5 B    Bed Mobility   Bed Mobility Comments supine > sit ModA    Transfer Skills   Transfer Comments sit <> stand CGA    Gait   Gait Comments ambulated 10 feet to sink and back with CGA    Balance   Balance Comments able to sit EOB with no UE support, able to stand with one hand support on walker    General Therapy Interventions   Planned Therapy Interventions balance training;bed mobility training;gait training;ROM;strengthening;stretching;transfer training;neuromuscular re-education   Clinical Impression   Criteria for Skilled Therapeutic Intervention yes, treatment indicated   PT Diagnosis generalized weakness and confusion   Influenced by the following impairments weakness, confusion, decreased activity tolerance    Functional limitations due to impairments transfers, bed mobility, gait, safety    Clinical Presentation Evolving/Changing   Clinical Presentation Rationale clinical decision making and chart review   Clinical Decision Making (Complexity) Low complexity   Therapy Frequency Daily   Predicted Duration of Therapy Intervention (days/wks) 3 days   Anticipated Discharge Disposition Other (see comments)  (back to memory care unit once medical status improves)   Risk & Benefits of therapy have been explained Yes   Patient, Family & other staff in agreement with plan of care Yes   [facility] BTIG-PAC TM \"6 Clicks\"   2016, Trustees of [facility], under license to [employer].  All rights reserved.   6 Clicks Short Forms Basic Mobility Inpatient Short Form   [facility] AM-PAC  \"6 Clicks\" V.2 Basic Mobility Inpatient Short Form   1. Turning from your back to your side while in a flat " bed without using bedrails? 2 - A Lot   2. Moving from lying on your back to sitting on the side of a flat bed without using bedrails? 2 - A Lot   3. Moving to and from a bed to a chair (including a wheelchair)? 3 - A Little   4. Standing up from a chair using your arms (e.g., wheelchair, or bedside chair)? 4 - None   5. To walk in hospital room? 3 - A Little   6. Climbing 3-5 steps with a railing? 1 - Total   Basic Mobility Raw Score (Score out of 24.Lower scores equate to lower levels of function) 15   Total Evaluation Time   Total Evaluation Time (Minutes) 8       Rosario Lanier  PT, DPT       6/8/2020   [address] [address]  [email or web address]  Voicemail: [phone]

## 2020-06-08 NOTE — PROGRESS NOTES
"[facility]    Hospitalist Progress Note    Date of Service (when I saw the patient): 06/08/2020    Assessment & Plan   Maxime Powell is an 86 year old male admitted on 6/7/2020. He presented to the emergency department from assisted living facility for evaluation of increased confusion and concern for infection; he was found to have sepsis secondary to a UTI for which he is being admitted for further evaluation and treatment.     Sepsis  Urinary tract infection associated with indwelling urinary catheter (H)  Acute cystitis without hematuria  Presented with leukocytosis with left shift (WBC 12.0, AND 10.4), encephalopathy, and lactic acid 2.3. UA with large leukocyte esterase and pyuria (WBC > 182). Patient formerly had a Murcia, but pulled it out one day prior to admission (6/6). Was tested for UTI at assisted living facility on 6/6, positive for UTI and started on Keflex. CT chest, abdomen, & pelvis shows \"severe cystitis... mild bilateral hydronephrosis and hydroureter without obstructing stone.\" Was given 1750 ml NS in the emergency department and Levaquin 750 mg IV per sepsis order set.  - Additional 1L LR, then LR @ 125 ml/hr on admit  - Continue Levaquin (per sepsis order set when allergy to PCN), hold prior to admission Keflex  - Blood culture x 2 NGTD  - Urine culture growing GNR, speciation and sensitivities pending  - On prednisone daily, but no indication for stress dose steriods  - A&O x 1 at baseline, lives in memory unit     Med rec pending  Patient came from assisted living facility with medication list, but not the date/time of last dose. List is correct as of 5/5/20.   - HUC to try to obtain med rec information from assisted living facility in am   - Completed med orders on 6/8/2020     Urinary retention  Apparently has a Murcia at baseline, which he pulled out on 6.6. Takes Flomax 0.4 mg daily.  - Continue Flomax  - Bladder scan; replace Murcia for > 500 ml " urine     Septic encephalopathy  Encephalopathic on admission. Was able to understand conversation and follow commands, but unable to speak in coherent sentences. Unclear baseline cognitive status. No head CT obtained. Suspect this is related to sepsis and should improve with treatment of infection.  - Stable overnight, appears baseline, is pleasantly confused  - Head CT if cognition worsens     Acute metabolic alkalosis  Hypernatremia  Hypochloridemia  Hypokalemia  Admit VBG pH 7.45 / pCO2 42 / bicarb 29. Sodium 153, chloride 117. Patient appears significantly dry on exam.  -  ml/hr since admission  - Hypernatremia worsened to 156  - Change IVF to 0.45NS w/ 20 mEq KCl  - Replace K per protocol  - Check Mg  - BMP in am     Acute kidney injury   Admit creatinine 1.32 (baseline 0.7 - 1.0), GFR 48 (baseline 65 - 82). Appears dehydrated, likely prerenal / related to sepsis. Given 1.75L NS in the emergency department.  - Additional 1L LR bolus with LR @ 125 ml/hr overnight  - BMP in am  - Cr improved, change IVF to 0.45NS w/ 20 mEq KCl     Leukocytosis   Afebrile. WBC 12.0 with left shift (ANC 10.4). Due to UTI.   - Treat UTI above  - WBC in am     Elevated lactic acid - resolved  Admit lactic acid 2.3, resolved after 1.75 L IV fluids. Due to sepsis.  - No further rechecks     COVID PUI - negative  Assisted living facility was concerned for COVID. Clinically not consistent with COVID, low suspicion.  - COVID negative 6/7/20  - No precautions or re-test needed     Diabetes mellitus, type 2  A1c 7.5. Managed prior to admission with Lantus 10U q am, metformin 500 mg bid.  - Continue Lantus  - Hold metformin due to renal function  - High sliding scale insulin  - Hyper/hypoglycemia protocol  - Uncontrolled hyperglycemia since admission, since additional 3 unit(s) lantus now  - Increase Lantus to 14 unit(s) daily     ASCVD (arteriosclerotic cardiovascular disease)  Hyperlipidemia  Hypertension  History of PCI in 2001.  Managed prior to admission with lisinopril 10 mg q am. Not on any other cardiac medications prior to admission.   - Hold lisinopril due to renal function, resume as appropriate     Rheumatoid arthritis   Immunosuppressed status   Managed prior to admission with Humira 40 mg subcutaneous every 14 days, Plaquenil 200 mg daily, methotrexate 10 mg every Wednesday, and prednisone 7.5 mg daily.   - Hold Humira and methotrexate  - Continue Plaquenil and prednisone  - May need stress dose steroids     Weight loss / cachectica  Appears severely cachetic.   - Nutrition consult      DVT Prophylaxis: Pneumatic Compression Devices  Code Status: DNR/DNI    Disposition: Expected discharge in 1-2 days once sensitivities known.    Aung Rod    Interval History   Patient sitting in a chair.  Remains pleasantly confused.  Denies pain.    -Data reviewed today: I reviewed all new labs and imaging results over the last 24 hours. I personally reviewed no images or EKG's today.    Physical Exam   Temp: 97.6  F (36.4  C) Temp src: Oral BP: (!) 151/71 Pulse: 77 Heart Rate: 102 Resp: 16 SpO2: 98 % O2 Device: None (Room air)    Vitals:    06/07/20 2238 06/08/20 0307   Weight: 66.4 kg (146 lb 6.2 oz) 65.9 kg (145 lb 4.5 oz)     Vital Signs with Ranges  Temp:  [97  F (36.1  C)-98.6  F (37  C)] 97.6  F (36.4  C)  Pulse:  [71-97] 77  Heart Rate:  [] 102  Resp:  [16-20] 16  BP: (118-178)/() 151/71  SpO2:  [91 %-100 %] 98 %  I/O last 3 completed shifts:  In: -   Out: 1500 [Urine:1500]    Gen: Debilitated elderly male, alert and oriented x 1, no acute distressed  HEENT: Atraumatic, normocephalic; sclera non-injected, anicterric; oral mucosa moist, no lesion, no exudate  Lungs: Clear to ausculation, no wheezes, no rhonchi, no rales  Heart: Regular rate, regular rhythm, no gallops, no rubs, no murmurs  GI: Bowel sound normal, no hepatosplenomegaly, no masses, non-tender, non-distended, no guarding, no rebound tenderness  Lymph: No  lymphadenopathy, no edema  Skin: No rashes, no chronic venous stasis     Medications     lactated ringers 125 mL/hr at 06/08/20 0333       ferrous sulfate  325 mg Oral Daily     folic acid  1 mg Oral Daily     glipiZIDE  5 mg Oral QAM AC     hydroxychloroquine  200 mg Oral QAM     insulin aspart  1-10 Units Subcutaneous TID AC     insulin aspart  1-7 Units Subcutaneous At Bedtime     insulin glargine  10 Units Subcutaneous QAM     levofloxacin  750 mg Intravenous Q24H     miconazole   Topical BID     mirtazapine  7.5 mg Oral At Bedtime     predniSONE  7.5 mg Oral Daily with breakfast     sodium chloride (PF)  3 mL Intracatheter Q8H     tamsulosin  0.4 mg Oral Daily       Data   Recent Labs   Lab 06/08/20  0451 06/07/20  2258 06/07/20  1704   WBC 9.5  --  12.0*   HGB 11.2*  --  12.9*   MCV 95  --  95     --  180   * 155* 153*   POTASSIUM 3.1* 3.1* 3.1*   CHLORIDE 121* 121* 117*   CO2 29 28 29   BUN 40* 43* 45*   CR 1.11 1.26* 1.32*   ANIONGAP 6 6 7   LAISHA 8.9 8.9 9.8   * 229* 303*   ALBUMIN 2.0*  --  2.6*   PROTTOTAL 6.5*  --  7.9   BILITOTAL 0.4  --  0.5   ALKPHOS 81  --  91   ALT 21  --  24   AST 19  --  25       Recent Results (from the past 24 hour(s))   CT Chest/Abdomen/Pelvis w Contrast    Narrative    EXAM: CT CHEST/ABDOMEN/PELVIS W CONTRAST  LOCATION: [facility]  DATE/TIME: 6/7/2020 6:49 PM    INDICATION:  tender abdomen, poor historian, agitation;  COMPARISON: 02/29/2020 CT abdomen pelvis, 03/29/2019 chest CT for PE.  TECHNIQUE: CT angiogram chest during arterial phase injection IV contrast. 2D and 3D MIP reconstructions were performed by the CT technologist. Dose reduction techniques were used.     TECHNIQUE: CT scan of the abdomen and pelvis was performed following injection of IV contrast. Multiplanar reformats were obtained. Dose reduction techniques were used.    CONTRAST: 79 mL Isovue-370    CT CHEST FINDINGS:  HEART: Mild biatrial enlargement, left greater than right.  Trace pericardial effusion. Severe coronary arterial calcification.    LUNGS AND PLEURA: No pulmonary mass or consolidation. Stable focal consolidation within the left upper lobe posteriorly along the major fissure, unchanged compared to 03/29/2019 chest CT. Airways are clear. No pleural effusion or pneumothorax.    MEDIASTINUM: No abnormally enlarged mediastinal or hilar lymph nodes. Small hiatal hernia suggested.    MUSCULOSKELETAL: Prior right shoulder replacement. No suspicious bone lesion.      CT ABDOMEN PELVIS FINDINGS:  HEPATOBILIARY: Normal.    PANCREAS: Normal.    SPLEEN: Normal.    ADRENAL GLANDS: Normal.    KIDNEYS/BLADDER: Mild bilateral hydronephrosis and hydroureter. Marked circumferential bladder wall thickening, urothelial enhancement, and perivesicular inflammatory change. No urinary tract calcification. No suspicious renal mass.    BOWEL: No evidence for bowel obstruction. Appendix not identified.    LYMPH NODES: Normal.    VASCULATURE: Mildly dilated infrarenal abdominal aorta maximally 33 mm.    PELVIC ORGANS: Unremarkable.    OTHER: None.    MUSCULOSKELETAL: No suspicious bone lesion. Severe degenerative change in the lower lumbar spine.        Impression    IMPRESSION:  1.  No acute abnormality in the chest.  2.  Stable focal density in the left upper lobe unchanged compared to 03/29/2019. Depending on the clinical picture, recommend continued surveillance in 12 months.    3.  Severe cystitis suggested please correlate clinically. Mild bilateral hydronephrosis and hydroureter without obstructing stone.

## 2020-06-09 ENCOUNTER — APPOINTMENT (OUTPATIENT)
Dept: PHYSICAL THERAPY | Facility: CLINIC | Age: 85
DRG: 919 | End: 2020-06-09
Payer: MEDICARE

## 2020-06-09 LAB
ANION GAP SERPL CALCULATED.3IONS-SCNC: 4 MMOL/L (ref 3–14)
BACTERIA SPEC CULT: ABNORMAL
BUN SERPL-MCNC: 26 MG/DL (ref 7–30)
CALCIUM SERPL-MCNC: 8.9 MG/DL (ref 8.5–10.1)
CHLORIDE SERPL-SCNC: 120 MMOL/L (ref 94–109)
CO2 SERPL-SCNC: 29 MMOL/L (ref 20–32)
CREAT SERPL-MCNC: 0.98 MG/DL (ref 0.66–1.25)
ERYTHROCYTE [DISTWIDTH] IN BLOOD BY AUTOMATED COUNT: 14.5 % (ref 10–15)
GFR SERPL CREATININE-BSD FRML MDRD: 69 ML/MIN/{1.73_M2}
GLUCOSE BLDC GLUCOMTR-MCNC: 116 MG/DL (ref 70–99)
GLUCOSE BLDC GLUCOMTR-MCNC: 170 MG/DL (ref 70–99)
GLUCOSE BLDC GLUCOMTR-MCNC: 231 MG/DL (ref 70–99)
GLUCOSE BLDC GLUCOMTR-MCNC: 279 MG/DL (ref 70–99)
GLUCOSE SERPL-MCNC: 118 MG/DL (ref 70–99)
HCT VFR BLD AUTO: 36.8 % (ref 40–53)
HGB BLD-MCNC: 11.6 G/DL (ref 13.3–17.7)
Lab: ABNORMAL
MCH RBC QN AUTO: 30.4 PG (ref 26.5–33)
MCHC RBC AUTO-ENTMCNC: 31.5 G/DL (ref 31.5–36.5)
MCV RBC AUTO: 96 FL (ref 78–100)
PLATELET # BLD AUTO: 158 10E9/L (ref 150–450)
POTASSIUM SERPL-SCNC: 3.6 MMOL/L (ref 3.4–5.3)
RBC # BLD AUTO: 3.82 10E12/L (ref 4.4–5.9)
SODIUM SERPL-SCNC: 153 MMOL/L (ref 133–144)
SPECIMEN SOURCE: ABNORMAL
WBC # BLD AUTO: 6.3 10E9/L (ref 4–11)

## 2020-06-09 PROCEDURE — 80048 BASIC METABOLIC PNL TOTAL CA: CPT | Performed by: INTERNAL MEDICINE

## 2020-06-09 PROCEDURE — 25000132 ZZH RX MED GY IP 250 OP 250 PS 637: Mod: GY | Performed by: PHYSICIAN ASSISTANT

## 2020-06-09 PROCEDURE — 99232 SBSQ HOSP IP/OBS MODERATE 35: CPT | Performed by: INTERNAL MEDICINE

## 2020-06-09 PROCEDURE — 97530 THERAPEUTIC ACTIVITIES: CPT | Mod: GP | Performed by: PHYSICAL THERAPIST

## 2020-06-09 PROCEDURE — 25000132 ZZH RX MED GY IP 250 OP 250 PS 637: Mod: GY | Performed by: INTERNAL MEDICINE

## 2020-06-09 PROCEDURE — 25000131 ZZH RX MED GY IP 250 OP 636 PS 637: Mod: GY | Performed by: PHYSICIAN ASSISTANT

## 2020-06-09 PROCEDURE — 25000128 H RX IP 250 OP 636: Performed by: PHYSICIAN ASSISTANT

## 2020-06-09 PROCEDURE — 00000146 ZZHCL STATISTIC GLUCOSE BY METER IP

## 2020-06-09 PROCEDURE — 97116 GAIT TRAINING THERAPY: CPT | Mod: GP | Performed by: PHYSICAL THERAPIST

## 2020-06-09 PROCEDURE — 85027 COMPLETE CBC AUTOMATED: CPT | Performed by: INTERNAL MEDICINE

## 2020-06-09 PROCEDURE — 25000128 H RX IP 250 OP 636: Performed by: INTERNAL MEDICINE

## 2020-06-09 PROCEDURE — 25000131 ZZH RX MED GY IP 250 OP 636 PS 637: Mod: GY | Performed by: INTERNAL MEDICINE

## 2020-06-09 PROCEDURE — 36415 COLL VENOUS BLD VENIPUNCTURE: CPT | Performed by: INTERNAL MEDICINE

## 2020-06-09 PROCEDURE — 12000000 ZZH R&B MED SURG/OB

## 2020-06-09 RX ORDER — LISINOPRIL 10 MG/1
10 TABLET ORAL DAILY
Status: DISCONTINUED | OUTPATIENT
Start: 2020-06-09 | End: 2020-06-11 | Stop reason: HOSPADM

## 2020-06-09 RX ADMIN — MICONAZOLE NITRATE: 20 POWDER TOPICAL at 19:42

## 2020-06-09 RX ADMIN — GLIPIZIDE 5 MG: 5 TABLET ORAL at 08:39

## 2020-06-09 RX ADMIN — POTASSIUM CHLORIDE AND SODIUM CHLORIDE: 450; 150 INJECTION, SOLUTION INTRAVENOUS at 15:08

## 2020-06-09 RX ADMIN — LEVOFLOXACIN 750 MG: 5 INJECTION, SOLUTION INTRAVENOUS at 16:43

## 2020-06-09 RX ADMIN — PREDNISONE 7.5 MG: 2.5 TABLET ORAL at 08:41

## 2020-06-09 RX ADMIN — POTASSIUM CHLORIDE AND SODIUM CHLORIDE: 450; 150 INJECTION, SOLUTION INTRAVENOUS at 06:10

## 2020-06-09 RX ADMIN — TAMSULOSIN HYDROCHLORIDE 0.4 MG: 0.4 CAPSULE ORAL at 08:39

## 2020-06-09 RX ADMIN — LISINOPRIL 10 MG: 10 TABLET ORAL at 11:42

## 2020-06-09 RX ADMIN — INSULIN ASPART 4 UNITS: 100 INJECTION, SOLUTION INTRAVENOUS; SUBCUTANEOUS at 11:43

## 2020-06-09 RX ADMIN — INSULIN ASPART 2 UNITS: 100 INJECTION, SOLUTION INTRAVENOUS; SUBCUTANEOUS at 17:40

## 2020-06-09 RX ADMIN — INSULIN GLARGINE 14 UNITS: 100 INJECTION, SOLUTION SUBCUTANEOUS at 08:41

## 2020-06-09 RX ADMIN — MICONAZOLE NITRATE: 20 POWDER TOPICAL at 08:53

## 2020-06-09 RX ADMIN — FERROUS SULFATE TAB 325 MG (65 MG ELEMENTAL FE) 325 MG: 325 (65 FE) TAB at 08:39

## 2020-06-09 RX ADMIN — HYDROXYCHLOROQUINE SULFATE 200 MG: 200 TABLET, FILM COATED ORAL at 08:40

## 2020-06-09 RX ADMIN — FOLIC ACID 1 MG: 1 TABLET ORAL at 08:40

## 2020-06-09 ASSESSMENT — MIFFLIN-ST. JEOR: SCORE: 1392.75

## 2020-06-09 ASSESSMENT — ACTIVITIES OF DAILY LIVING (ADL)
ADLS_ACUITY_SCORE: 27

## 2020-06-09 NOTE — PLAN OF CARE
Patient stated the medication is doing what it is suppose to be doing.    Patient states that the sertraline 100mg is too much, he would like to take 75 mg(would like sertraline 50mg-he can break the pill) -what he was taken before.        Medications ordered in Epic: sertraline 50 mg, trazodone 100mg    Patient's pharmacy entered in [employer] and verified: yes    Patient's last appointment: 01/03/2018  Patient's next appointment: 01/04/2019  Patient's last refill:  02/22/2018    Patient notified that refill may take up to 24-48 hours and to check with their pharmacy for refill completion: yes         S:  Patient inpatient at [facility]    B:  Patient admitted 6/7 for sepsis d/t UTI    A:  Urine is yellow still a little cloudy  Patient is disoriented x 4  Florentino is unable to respond to questions  Florentino has MITTs on both hands to prevent him from pulling out IV lines and his catheter  Patient does not appear to be in pain  Patient vitals are stable  Patient had periods of restlessness     R:  Patient had periods of restlessness and periods of sleep throughout NOC shift    Jamin Deng RN

## 2020-06-09 NOTE — PROGRESS NOTES
Nutrition Note    Nutrition supplement order changed from Boost Breeze TID with meals to Boost Glucose Control TID between meals, as diet has advanced from clear liquid to consistent carbohydrate. Boost GC TID will provide a total of 750 kcal, 42 g protein, and 69 g CHO.     Will continue monitoring pt's nutrition status and modify  intervention as needed.    Sara Pitts RD, LD  Clinical Dietitian  [facility]: [phone]  [facility]: [phone]

## 2020-06-09 NOTE — PROGRESS NOTES
CARE TRANSITIONS PROGRESS NOTE:    Reason for Follow up: Discharge planning.  Pt currently resides at [facility] (Main: [phone] Fax: [phone]) in the Memory Care unit.      Anticipated discharge needs: Return to [facility].  Staff RN has requested PT exercise sheets be sent with the pt so that their staff can work with the pt.    Next steps: Awaiting medical stability.    CTS discussed transportation options with sonCheri.  Discussed vendor, mode of transportation & anticipated private pay costs.  Cheri agree to private pay costs associated with [facility] [employer] (Ph: [phone]) services.    Discharge Planner   Discharge Plans in progress: Return to [facility]  Barriers to discharge plan: Medical stability  Follow up plan: CTS to follow       Entered by: Vera Wang 06/09/2020 9:23 AM       LINDA LazcanoNortheast Georgia [facility] Center [facility] [phone]   [facility]  [phone]

## 2020-06-09 NOTE — PLAN OF CARE
Discharge Planner PT   Patient plan for discharge: custodial, prior andradegideon [facility] resident  Current status: today in PM, amb 75ft RW, min A, sit to sup many VC, able to scoot some, did end with mod A x2 to scoot to head of bed  Barriers to return to prior living situation: none  Recommendations for discharge: custodial when stable  Rationale for recommendations: much stronger this afternoon       Entered by: Kris Hoenk 06/09/2020 3:18 PM

## 2020-06-09 NOTE — PLAN OF CARE
Vitals stable. States no pain; no nonverbal indicators of pain.  Ate lunch a little better than breakfast.  Assist of 2 with gait belt and walker.  Disoriented x4.  Patient's daughter in law was here; patient's energy level and orientation improved with daughter in law at the bedside. Ate an ice cream and walked in hallway with OT/PT.  Plan of care reviewed with patient and daughter in law

## 2020-06-10 ENCOUNTER — APPOINTMENT (OUTPATIENT)
Dept: PHYSICAL THERAPY | Facility: CLINIC | Age: 85
DRG: 919 | End: 2020-06-10
Payer: MEDICARE

## 2020-06-10 LAB
ANION GAP SERPL CALCULATED.3IONS-SCNC: 4 MMOL/L (ref 3–14)
BUN SERPL-MCNC: 23 MG/DL (ref 7–30)
CALCIUM SERPL-MCNC: 8.2 MG/DL (ref 8.5–10.1)
CHLORIDE SERPL-SCNC: 113 MMOL/L (ref 94–109)
CO2 SERPL-SCNC: 27 MMOL/L (ref 20–32)
CREAT SERPL-MCNC: 0.94 MG/DL (ref 0.66–1.25)
GFR SERPL CREATININE-BSD FRML MDRD: 73 ML/MIN/{1.73_M2}
GLUCOSE BLDC GLUCOMTR-MCNC: 100 MG/DL (ref 70–99)
GLUCOSE BLDC GLUCOMTR-MCNC: 106 MG/DL (ref 70–99)
GLUCOSE BLDC GLUCOMTR-MCNC: 133 MG/DL (ref 70–99)
GLUCOSE BLDC GLUCOMTR-MCNC: 180 MG/DL (ref 70–99)
GLUCOSE BLDC GLUCOMTR-MCNC: 297 MG/DL (ref 70–99)
GLUCOSE SERPL-MCNC: 104 MG/DL (ref 70–99)
MAGNESIUM SERPL-MCNC: 1.6 MG/DL (ref 1.6–2.3)
POTASSIUM SERPL-SCNC: 3.3 MMOL/L (ref 3.4–5.3)
POTASSIUM SERPL-SCNC: 3.8 MMOL/L (ref 3.4–5.3)
SODIUM SERPL-SCNC: 144 MMOL/L (ref 133–144)

## 2020-06-10 PROCEDURE — 25000132 ZZH RX MED GY IP 250 OP 250 PS 637: Mod: GY | Performed by: INTERNAL MEDICINE

## 2020-06-10 PROCEDURE — 25000131 ZZH RX MED GY IP 250 OP 636 PS 637: Mod: GY | Performed by: INTERNAL MEDICINE

## 2020-06-10 PROCEDURE — 25000132 ZZH RX MED GY IP 250 OP 250 PS 637: Mod: GY | Performed by: PHYSICIAN ASSISTANT

## 2020-06-10 PROCEDURE — 99232 SBSQ HOSP IP/OBS MODERATE 35: CPT | Performed by: INTERNAL MEDICINE

## 2020-06-10 PROCEDURE — 00000146 ZZHCL STATISTIC GLUCOSE BY METER IP

## 2020-06-10 PROCEDURE — 36415 COLL VENOUS BLD VENIPUNCTURE: CPT | Performed by: INTERNAL MEDICINE

## 2020-06-10 PROCEDURE — 97110 THERAPEUTIC EXERCISES: CPT | Mod: GP | Performed by: PHYSICAL THERAPIST

## 2020-06-10 PROCEDURE — 25000131 ZZH RX MED GY IP 250 OP 636 PS 637: Mod: GY | Performed by: PHYSICIAN ASSISTANT

## 2020-06-10 PROCEDURE — 25000128 H RX IP 250 OP 636: Performed by: INTERNAL MEDICINE

## 2020-06-10 PROCEDURE — 12000000 ZZH R&B MED SURG/OB

## 2020-06-10 PROCEDURE — 25000128 H RX IP 250 OP 636: Performed by: PHYSICIAN ASSISTANT

## 2020-06-10 PROCEDURE — 80048 BASIC METABOLIC PNL TOTAL CA: CPT | Performed by: INTERNAL MEDICINE

## 2020-06-10 PROCEDURE — 84132 ASSAY OF SERUM POTASSIUM: CPT | Performed by: INTERNAL MEDICINE

## 2020-06-10 PROCEDURE — 83735 ASSAY OF MAGNESIUM: CPT | Performed by: INTERNAL MEDICINE

## 2020-06-10 RX ORDER — LEVOFLOXACIN 750 MG/1
750 TABLET, FILM COATED ORAL
Qty: 3 TABLET | Refills: 0 | Status: SHIPPED | OUTPATIENT
Start: 2020-06-11 | End: 2020-06-14

## 2020-06-10 RX ORDER — ACETAMINOPHEN 325 MG/1
650 TABLET ORAL EVERY 4 HOURS PRN
COMMUNITY
Start: 2020-06-10

## 2020-06-10 RX ORDER — LEVOFLOXACIN 500 MG/1
500 TABLET, FILM COATED ORAL
Qty: 3 TABLET | Refills: 0 | Status: SHIPPED | OUTPATIENT
Start: 2020-06-11 | End: 2020-06-10

## 2020-06-10 RX ADMIN — TAMSULOSIN HYDROCHLORIDE 0.4 MG: 0.4 CAPSULE ORAL at 08:15

## 2020-06-10 RX ADMIN — GLIPIZIDE 5 MG: 5 TABLET ORAL at 08:15

## 2020-06-10 RX ADMIN — LEVOFLOXACIN 750 MG: 5 INJECTION, SOLUTION INTRAVENOUS at 16:59

## 2020-06-10 RX ADMIN — Medication 1 MG: at 23:27

## 2020-06-10 RX ADMIN — MIRTAZAPINE 7.5 MG: 7.5 TABLET, FILM COATED ORAL at 22:33

## 2020-06-10 RX ADMIN — MICONAZOLE NITRATE: 20 POWDER TOPICAL at 20:27

## 2020-06-10 RX ADMIN — INSULIN ASPART 7 UNITS: 100 INJECTION, SOLUTION INTRAVENOUS; SUBCUTANEOUS at 11:18

## 2020-06-10 RX ADMIN — Medication 10 MEQ: at 07:48

## 2020-06-10 RX ADMIN — PREDNISONE 7.5 MG: 2.5 TABLET ORAL at 08:15

## 2020-06-10 RX ADMIN — FOLIC ACID 1 MG: 1 TABLET ORAL at 08:15

## 2020-06-10 RX ADMIN — Medication 10 MEQ: at 06:23

## 2020-06-10 RX ADMIN — Medication 10 MEQ: at 10:20

## 2020-06-10 RX ADMIN — Medication 10 MEQ: at 11:42

## 2020-06-10 RX ADMIN — ACETAMINOPHEN 650 MG: 325 TABLET, FILM COATED ORAL at 16:59

## 2020-06-10 RX ADMIN — HYDROXYCHLOROQUINE SULFATE 200 MG: 200 TABLET, FILM COATED ORAL at 08:15

## 2020-06-10 RX ADMIN — LISINOPRIL 10 MG: 10 TABLET ORAL at 08:15

## 2020-06-10 RX ADMIN — FERROUS SULFATE TAB 325 MG (65 MG ELEMENTAL FE) 325 MG: 325 (65 FE) TAB at 08:14

## 2020-06-10 RX ADMIN — INSULIN ASPART 2 UNITS: 100 INJECTION, SOLUTION INTRAVENOUS; SUBCUTANEOUS at 17:11

## 2020-06-10 RX ADMIN — POTASSIUM CHLORIDE AND SODIUM CHLORIDE: 450; 150 INJECTION, SOLUTION INTRAVENOUS at 01:20

## 2020-06-10 RX ADMIN — Medication 10 MEQ: at 08:48

## 2020-06-10 RX ADMIN — MICONAZOLE NITRATE: 20 POWDER TOPICAL at 08:16

## 2020-06-10 RX ADMIN — INSULIN GLARGINE 14 UNITS: 100 INJECTION, SOLUTION SUBCUTANEOUS at 08:19

## 2020-06-10 ASSESSMENT — ACTIVITIES OF DAILY LIVING (ADL)
ADLS_ACUITY_SCORE: 27

## 2020-06-10 ASSESSMENT — MIFFLIN-ST. JEOR: SCORE: 1383.75

## 2020-06-10 NOTE — PROGRESS NOTES
Nutrition Note      Pt eating poorly - ate 1 bite of breakfast yesterday and only a little bit better for lunch. Due to confusion and poor intake, RD will change timing of Boost Glucose Control from TID between meals to TID with meals.        nAisha Golden RDN, LD  Clinical Dietitian  [phone]

## 2020-06-10 NOTE — PROGRESS NOTES
"[facility]    Hospitalist Progress Note    Date of Service (when I saw the patient): 06/10/2020    Assessment & Plan   Maxime Powell is an 86 year old male admitted on 6/7/2020. He presented to the emergency department from assisted living facility for evaluation of increased confusion and concern for infection; he was found to have sepsis secondary to a UTI for which he is being admitted for further evaluation and treatment.     Sepsis  Urinary tract infection associated with indwelling urinary catheter (H)  Acute cystitis without hematuria  Presented with leukocytosis with left shift (WBC 12.0, AND 10.4), encephalopathy, and lactic acid 2.3. UA with large leukocyte esterase and pyuria (WBC > 182). Patient formerly had a Murcia, but pulled it out one day prior to admission (6/6). Was tested for UTI at [facility] living [facility] on 6/6, positive for UTI and started on Keflex. CT chest, abdomen, & pelvis shows \"severe cystitis... mild bilateral hydronephrosis and hydroureter without obstructing stone.\" Was given 1750 ml NS in the emergency department and Levaquin 750 mg IV per sepsis order set.  - Additional 1L LR, then LR @ 125 ml/hr on admit  - Continue Levaquin (per sepsis order set when allergy to PCN), hold prior to admission Keflex  - Blood culture x 2 NGTD  - Urine culture growing Pseudomonas aeruginosa, continue Levaquin  - On prednisone daily, but no indication for stress dose steriods  - A&O x 1 at baseline, lives in memory unit  - Poor oral intake AM of 6/9, will monitor overnight  - Oral intake improved at lunch 6/10  - Facility able to take patient back on 6/11     Med rec completed  Patient came from assisted living facility with medication list, but not the date/time of last dose. List is correct as of 5/5/20.   - HUC to try to obtain med rec information from assisted living facility in am   - Completed med orders on 6/8/2020     Urinary retention  Apparently has a Murcia at " baseline, which he pulled out on 6.6. Takes Flomax 0.4 mg daily.  - Continue Flomax  - Patient discharge with Murcia in place     Septic encephalopathy  Encephalopathic on admission. Was able to understand conversation and follow commands, but unable to speak in coherent sentences. Unclear baseline cognitive status. No head CT obtained. Suspect this is related to sepsis and should improve with treatment of infection.  - Stable overnight, appears baseline, is pleasantly confused     Acute metabolic alkalosis  Hypernatremia  Hypochloridemia  Hypokalemia  Admit VBG pH 7.45 / pCO2 42 / bicarb 29. Sodium 153, chloride 117. Patient appears significantly dry on exam.  -  ml/hr since admission  - Hypernatremia worsened to 156  - Change IVF to 0.45NS w/ 20 mEq KCl  - Replace K per protocol  - Mg 1.6  - Oral intake improved  - Hypernatremia resolved on 6/10  - Saline lock     Acute kidney injury   Admit creatinine 1.32 (baseline 0.7 - 1.0), GFR 48 (baseline 65 - 82). Appears dehydrated, likely prerenal / related to sepsis. Given 1.75L NS in the emergency department.  - Additional 1L LR bolus with LR @ 125 ml/hr overnight  - BMP in am  - Cr improved, change IVF to 0.45NS w/ 20 mEq KCl  - Resolved, Cr now 0.98 on 6/9     Leukocytosis   Afebrile. WBC 12.0 with left shift (ANC 10.4). Due to UTI.   - Treat UTI above  - WBC 6.3 on 6/9     Elevated lactic acid - resolved  Admit lactic acid 2.3, resolved after 1.75 L IV fluids. Due to sepsis.  - No further rechecks     COVID PUI - negative  Assisted living facility was concerned for COVID. Clinically not consistent with COVID, low suspicion.  - COVID negative 6/7/20  - No precautions or re-test needed     Diabetes mellitus, type 2  A1c 7.5. Managed prior to admission with Lantus 10U q am, metformin 500 mg bid.  - Continue Lantus  - Hold metformin due to renal function  - High sliding scale insulin  - Hyper/hypoglycemia protocol  - Uncontrolled hyperglycemia since admission,  since additional 3 unit(s) lantus now  - Increase Lantus to 14 unit(s) daily on 6/8  - Hyperglycemia improved on 6/9     ASCVD (arteriosclerotic cardiovascular disease)  Hyperlipidemia  Hypertension  History of PCI in 2001. Managed prior to admission with lisinopril 10 mg q am. Not on any other cardiac medications prior to admission.   - Hold lisinopril on admission due to renal function  - BP elevated on 6/9, resume lisinopril     Rheumatoid arthritis   Immunosuppressed status   Managed prior to admission with Humira 40 mg subcutaneous every 14 days, Plaquenil 200 mg daily, methotrexate 10 mg every Wednesday, and prednisone 7.5 mg daily.   - Hold Humira and methotrexate  - Continue Plaquenil and prednisone  - No indication for stress dose steroids currently     Weight loss / cachectica  Appears severely cachetic.   - Nutrition consult      DVT Prophylaxis: Pneumatic Compression Devices  Code Status: DNR/DNI    Disposition: Expected discharge tomorrow.    Aung Rod    Interval History   Patient resting in bed.  He denies pain or nausea.  Remains pleasantly confused.  Nurse reports patient ate well at lunch and is drinking fluids.    -Data reviewed today: I reviewed all new labs and imaging results over the last 24 hours. I personally reviewed no images or EKG's today.    Physical Exam   Temp: 97.8  F (36.6  C) Temp src: Oral BP: (!) 151/91 Pulse: 80   Resp: 16 SpO2: 100 % O2 Device: None (Room air)    Vitals:    06/08/20 0307 06/09/20 0448 06/10/20 0616   Weight: 65.9 kg (145 lb 4.5 oz) 64.3 kg (141 lb 12.1 oz) 63.4 kg (139 lb 12.4 oz)     Vital Signs with Ranges  Temp:  [97.6  F (36.4  C)-98.9  F (37.2  C)] 97.8  F (36.6  C)  Pulse:  [73-80] 80  Resp:  [16-18] 16  BP: (143-155)/(64-91) 151/91  SpO2:  [90 %-100 %] 100 %  I/O last 3 completed shifts:  In: 3649.99 [P.O.:1150; I.V.:2499.99]  Out: 4700 [Urine:4700]    Gen: Debilitated elderly male, alert and oriented x 1, no acute distressed  HEENT: Atraumatic,  normocephalic; sclera non-injected, anicterric; oral mucosa moist, no lesion, no exudate  Lungs: Clear to ausculation, no wheezes, no rhonchi, no rales  Heart: Regular rate, regular rhythm, no gallops, no rubs, no murmurs  GI: Bowel sound normal, no hepatosplenomegaly, no masses, non-tender, non-distended, no guarding, no rebound tenderness  Lymph: No lymphadenopathy, no edema  Skin: No rashes, no chronic venous stasis     Medications     0.45% sodium chloride + KCl 20 mEq/L 125 mL/hr at 06/10/20 1200       ferrous sulfate  325 mg Oral Daily     folic acid  1 mg Oral Daily     glipiZIDE  5 mg Oral QAM AC     hydroxychloroquine  200 mg Oral QAM     insulin aspart  1-10 Units Subcutaneous TID AC     insulin aspart  1-7 Units Subcutaneous At Bedtime     insulin glargine  14 Units Subcutaneous QAM     levofloxacin  750 mg Intravenous Q24H     lisinopril  10 mg Oral Daily     miconazole   Topical BID     mirtazapine  7.5 mg Oral At Bedtime     predniSONE  7.5 mg Oral Daily with breakfast     sodium chloride (PF)  3 mL Intracatheter Q8H     tamsulosin  0.4 mg Oral Daily       Data   Recent Labs   Lab 06/10/20  0418 06/09/20  0437 06/08/20  2132  06/08/20  0451  06/07/20  1704   WBC  --  6.3  --   --  9.5  --  12.0*   HGB  --  11.6*  --   --  11.2*  --  12.9*   MCV  --  96  --   --  95  --  95   PLT  --  158  --   --  169  --  180    153*  --   --  156*   < > 153*   POTASSIUM 3.3* 3.6 3.6   < > 3.1*   < > 3.1*   CHLORIDE 113* 120*  --   --  121*   < > 117*   CO2 27 29  --   --  29   < > 29   BUN 23 26  --   --  40*   < > 45*   CR 0.94 0.98  --   --  1.11   < > 1.32*   ANIONGAP 4 4  --   --  6   < > 7   LAISHA 8.2* 8.9  --   --  8.9   < > 9.8   * 118*  --   --  226*   < > 303*   ALBUMIN  --   --   --   --  2.0*  --  2.6*   PROTTOTAL  --   --   --   --  6.5*  --  7.9   BILITOTAL  --   --   --   --  0.4  --  0.5   ALKPHOS  --   --   --   --  81  --  91   ALT  --   --   --   --  21  --  24   AST  --   --   --   --   19  --  25    < > = values in this interval not displayed.       No results found for this or any previous visit (from the past 24 hour(s)).

## 2020-06-10 NOTE — PROGRESS NOTES
CARE TRANSITIONS PROGRESS NOTE:    Reason for Follow up: discharge planning    Anticipated discharge needs: pt will return to [facility] (Main: [phone] Fax: [phone]) on 6-11-20.  Chanell spoke with RNNicky, and she will be prepared for the pt to return in the AM.    CTS discussed transportation options with pt' son, Cheri.  Discussed vendor, mode of transportation & anticipated private pay costs.  Cheri agree to private pay costs associated with [facility] [employer] (Ph: [phone]) services.    SW called pt's son, Cheri, and he is in agreement with pt's discharge tomorrow.    Next steps: Pt will discharge to [facility] (Main: [phone] Fax: [phone]) at 11 AM.    All medications to be faxed to RNNicky, at [facility] prior to discharge.    Discharge Planner   Discharge Plans in progress: Return to [facility]  Barriers to discharge plan: None  Follow up plan: CTS to follow.       Entered by: Vera Wang 06/10/2020 2:34 PM       Vera Judd [facility] [phone]   [facility]  [phone]    Vera Judd [facility] [phone]   [facility]  [phone]

## 2020-06-10 NOTE — PLAN OF CARE
Discharge Planner PT   Patient plan for discharge: back to memory care unit  Current status: needing modA for supine > sit,  SBA for sit <> stand, completed standing exercises and provided with HEP for after discharge. Sit > supine SBA.   Barriers to return to prior living situation: medical status  Recommendations for discharge: back to memory care unit with staff helping with HEP provided today   Rationale for recommendations: patient at baseline function, had A x 1 for mobility and transfers prior to admission.        Entered by: Rosario Lanier 06/10/2020 3:18 PM         Physical Therapy Discharge Summary    Reason for therapy discharge:    No further expectations of functional progress.    Progress towards therapy goal(s). See goals on Care Plan in [facility] electronic health record for goal details.  Goals partially met.  Barriers to achieving goals:   limited tolerance for therapy and patient is at his baseline function prior to admission at the hospital. He received A x 1 at baseline for all mobility and transfers at memory care unit.    Therapy recommendation(s):    Continue HEP provided today with staff at memory care unit.

## 2020-06-10 NOTE — PROGRESS NOTES
Pt alert and confused, at baseline. Pt pulled off brief and pulling at martinez. Mitts applied. Too confused to take oral pills. Adequate urine output. Incontinent of stool x2. VSS. Pt does not appear to be in pain. Potassium 3.3- replaced per protocol.     Mela Erazo RN

## 2020-06-11 VITALS
BODY MASS INDEX: 17.97 KG/M2 | DIASTOLIC BLOOD PRESSURE: 60 MMHG | HEART RATE: 80 BPM | TEMPERATURE: 97.8 F | RESPIRATION RATE: 18 BRPM | SYSTOLIC BLOOD PRESSURE: 128 MMHG | WEIGHT: 139.99 LBS | HEIGHT: 74 IN | OXYGEN SATURATION: 96 %

## 2020-06-11 LAB
ANION GAP SERPL CALCULATED.3IONS-SCNC: 7 MMOL/L (ref 3–14)
BUN SERPL-MCNC: 20 MG/DL (ref 7–30)
CALCIUM SERPL-MCNC: 7.9 MG/DL (ref 8.5–10.1)
CHLORIDE SERPL-SCNC: 109 MMOL/L (ref 94–109)
CO2 SERPL-SCNC: 27 MMOL/L (ref 20–32)
CREAT SERPL-MCNC: 0.82 MG/DL (ref 0.66–1.25)
GFR SERPL CREATININE-BSD FRML MDRD: 80 ML/MIN/{1.73_M2}
GLUCOSE SERPL-MCNC: 104 MG/DL (ref 70–99)
MAGNESIUM SERPL-MCNC: 1.5 MG/DL (ref 1.6–2.3)
POTASSIUM SERPL-SCNC: 3.1 MMOL/L (ref 3.4–5.3)
SODIUM SERPL-SCNC: 143 MMOL/L (ref 133–144)

## 2020-06-11 PROCEDURE — 83735 ASSAY OF MAGNESIUM: CPT | Performed by: INTERNAL MEDICINE

## 2020-06-11 PROCEDURE — 25000132 ZZH RX MED GY IP 250 OP 250 PS 637: Mod: GY | Performed by: PHYSICIAN ASSISTANT

## 2020-06-11 PROCEDURE — 25000131 ZZH RX MED GY IP 250 OP 636 PS 637: Mod: GY | Performed by: INTERNAL MEDICINE

## 2020-06-11 PROCEDURE — 99239 HOSP IP/OBS DSCHRG MGMT >30: CPT | Performed by: FAMILY MEDICINE

## 2020-06-11 PROCEDURE — 25000132 ZZH RX MED GY IP 250 OP 250 PS 637: Mod: GY | Performed by: INTERNAL MEDICINE

## 2020-06-11 PROCEDURE — 80048 BASIC METABOLIC PNL TOTAL CA: CPT | Performed by: INTERNAL MEDICINE

## 2020-06-11 PROCEDURE — 36415 COLL VENOUS BLD VENIPUNCTURE: CPT | Performed by: INTERNAL MEDICINE

## 2020-06-11 PROCEDURE — 00000146 ZZHCL STATISTIC GLUCOSE BY METER IP

## 2020-06-11 RX ORDER — POTASSIUM CHLORIDE 1500 MG/1
20 TABLET, EXTENDED RELEASE ORAL 2 TIMES DAILY
DISCHARGE
Start: 2020-06-11

## 2020-06-11 RX ADMIN — INSULIN GLARGINE 14 UNITS: 100 INJECTION, SOLUTION SUBCUTANEOUS at 08:19

## 2020-06-11 RX ADMIN — LISINOPRIL 10 MG: 10 TABLET ORAL at 08:17

## 2020-06-11 RX ADMIN — MICONAZOLE NITRATE: 20 POWDER TOPICAL at 08:21

## 2020-06-11 RX ADMIN — POTASSIUM CHLORIDE 40 MEQ: 1.5 POWDER, FOR SOLUTION ORAL at 06:50

## 2020-06-11 RX ADMIN — FOLIC ACID 1 MG: 1 TABLET ORAL at 08:17

## 2020-06-11 RX ADMIN — TAMSULOSIN HYDROCHLORIDE 0.4 MG: 0.4 CAPSULE ORAL at 08:17

## 2020-06-11 RX ADMIN — GLIPIZIDE 5 MG: 5 TABLET ORAL at 08:17

## 2020-06-11 RX ADMIN — FERROUS SULFATE TAB 325 MG (65 MG ELEMENTAL FE) 325 MG: 325 (65 FE) TAB at 08:16

## 2020-06-11 RX ADMIN — HYDROXYCHLOROQUINE SULFATE 200 MG: 200 TABLET, FILM COATED ORAL at 08:19

## 2020-06-11 ASSESSMENT — MIFFLIN-ST. JEOR: SCORE: 1384.75

## 2020-06-11 ASSESSMENT — ACTIVITIES OF DAILY LIVING (ADL)
ADLS_ACUITY_SCORE: 27

## 2020-06-11 NOTE — PROGRESS NOTES
CARE TRANSITIONS DISCHARGE NOTE:    Name: Maxime Powell    Admit Date and Time: 6/7/2020  4:15 PM    MRN#: [id number]    Reason for Hospitalization: Acute cystitis without hematuria [N30.00]  Sepsis with encephalopathy without septic shock, due to unspecified organism (H) [A41.9, R65.20, G93.40]    Discharge Date: 6/11/2020    Patient / Family response to discharge plan:  Pt's son is in agreement    Other Providers (Care Coordinator, County Services, PCA services etc): Yes: [facility] (Main: [phone] Fax: [phone]).  Staff RN, Ariana, updated.    CTS Hand Off Completed: Not needed    PAS #: N/A    J LUIS Score: Medium    Future Appointments: No future appointments.    Discharge Disposition: assisted living, memory care unit via [facility] wheelchair transport at 11 am    Discharge Planner   Discharge Plans in progress: complete  Barriers to discharge plan: None  Follow up plan: ALANNA to follow       Entered by: Vera Wang 06/11/2020 8:06 AM       Vera Judd [facility] [phone]   [facility] [place] [facility] [place]  [phone]

## 2020-06-11 NOTE — DISCHARGE INSTRUCTIONS
Nutrition:    In light of recent weight loss and poor oral intake for at least the past week, recommend a high-calorie, high-protein diet upon discharge. During admission, patient received Boost Glucose Control TID with meals to supplement poor oral intake. Suspect that appetite will return to baseline as infection clears. Patient ate 25% of meals TID on 6/10/20. If oral intake does not improve, suggest an oral nutrition supplement such as Boost 2-3 times per day between meals, depending on oral intake. Boost Plus is recommended due to its' high calorie and protein content. However, depending on the patient's blood sugar levels, Boost Glucose Control or Glucerna may be warranted.     Boost Plus: 360 calories, 14 g protein, and 45 g carbs  Boost Original: 240 calories, 10 g protein, and 41 g carbs  Boost Glucose Control: 190 calories, 16 g protein, and 16 g carbs  Glucerna: 180 calories, 10 g protein, and 16 g carbs    Feel free to contact dietitian with questions at the number below:    Anisha Golden RDN, LD  Clinical Dietitian  [phone]

## 2020-06-11 NOTE — PROGRESS NOTES
Pt alert and confused, at baseline. Pt became restless and agitated, pulling off gown and tugging at martinez. Mitts applied. PRN Melatonin, pt responded well and was able to remove Mitts. Martinez intact and patent, adequate output. Denies pain.    Mela Erazo RN

## 2020-06-11 NOTE — PLAN OF CARE
Patient is alert to self and confused. Vss on room air. Not following simple direction. Some clear conversations, others illogical. Ao3 to get patient into wheelchair for transport. Called nursing facility he is being discharged to for report, no answer. Voicemail left, orders faxed.

## 2020-06-11 NOTE — PLAN OF CARE
[facility] DISCHARGE NOTE    Patient discharged to nursing home at 11:05 AM via wheel chair. Accompanied staff. Discharge instructions reviewed with caregiver, opportunity offered to ask questions. Prescriptions sent to patients preferred pharmacy. All belongings sent with patient.    Nasreen Dixon RN

## 2020-06-11 NOTE — PLAN OF CARE
Alert but confused. Vss on room air. Murcia intact, draining cloudy yellow urine. Attempted to get patient up for breakfast, uncooperative, and not assisting with waking up/participating with transfer. Attempted then for patient to sit up in bed for breakfast and medications, declining to do so, rolling into ball in bed. Took 3 bits of medications in apple sauce. Will re attempt to get patient up later. Anticipate discharge today around 11.   Alarms on.

## 2020-06-11 NOTE — DISCHARGE SUMMARY
[facility]  Hospitalist Discharge Summary      Date of Admission:  6/7/2020  Date of Discharge:  6/11/2020  Discharging Provider: Alejandra Echeverria MD      Discharge Diagnoses   Acute UTI associated with chronic indwelling urinary catheter  Sepsis due to UTI  Acute cystitis w/o hematuria  Urinary retention - indwelling martinez  Acute metabolic alkalosis  Hypernatremia, resolved  Hypokalemia  Hypomagnesemia  Acute kidney injury, resolved   leukocytosis resolved  Diabetes Type 2  ASCVD  Hyperlipidemia  RA  Immunosuppression  Weight loss/cachexia - severe protein calorie malnutrition    Follow-ups Needed After Discharge   Follow-up Appointments     Follow-up and recommended labs and tests      Follow up with primary care provider, [facility],   within 7 days for hospital follow- up.  The following labs/tests are   recommended: BMP and CBC and magnesium.               Unresulted Labs Ordered in the Past 30 Days of this Admission     Date and Time Order Name Status Description    6/7/2020 1627 Blood culture Preliminary     6/7/2020 1627 Blood culture Preliminary       These results will be followed up by      Discharge Disposition   Discharged to assisted living/memory care  Condition at discharge: Stable      Hospital Course   Maxime Powell is an 86 year old male admitted on 6/7/2020. He presented to the emergency department from assisted living facility for evaluation of increased confusion and concern for infection; he was found to have sepsis secondary to a UTI for which he is being admitted for further evaluation and treatment.     Sepsis  Urinary tract infection associated with indwelling urinary catheter (H)  Acute cystitis without hematuria  Presented with leukocytosis with left shift (WBC 12.0, AND 10.4), encephalopathy, and lactic acid 2.3. UA with large leukocyte esterase and pyuria (WBC > 182). Patient formerly had a Martinez, but pulled it out one day prior to  "admission (6/6). Was tested for UTI at assisted living facility on 6/6, positive for UTI and started on Keflex. CT chest, abdomen, & pelvis shows \"severe cystitis... mild bilateral hydronephrosis and hydroureter without obstructing stone.\" Was given 1750 ml NS in the emergency department and Levaquin 750 mg IV per sepsis order set.  - Additional 1L LR, then LR @ 125 ml/hr on admit  - Continue Levaquin (per sepsis order set when allergy to PCN), hold prior to admission Keflex  - Blood culture x 2 NGTD  - Urine culture growing Pseudomonas aeruginosa, continue Levaquin - 3 more days after discharge  - On prednisone daily, but no indication for stress dose steriods  - A&O x 1 at baseline, lives in memory unit       Med rec completed  Patient came from assisted living facility with medication list, but not the date/time of last dose. List is correct as of 5/5/20.   - HUC to try to obtain med rec information from assisted living facility in am   - Completed med orders on 6/8/2020     Urinary retention  Apparently has a Murcia at baseline, which he pulled out on 6.6. Takes Flomax 0.4 mg daily.  - Continue Flomax  - Patient discharge with Murcia in place     Septic encephalopathy  Encephalopathic on admission. Was able to understand conversation and follow commands, but unable to speak in coherent sentences. Unclear baseline cognitive status. No head CT obtained. Suspect this is related to sepsis and should improve with treatment of infection.  - Stable overnight, appears baseline, is pleasantly confused     Acute metabolic alkalosis  Hypernatremia  Hypochloridemia  Hypokalemia  Hypomagnesemia     Admit VBG pH 7.45 / pCO2 42 / bicarb 29. Sodium 153, chloride 117. Patient appears significantly dry on exam.  -  ml/hr since admission  - Hypernatremia worsened to 156  - Change IVF to 0.45NS w/ 20 mEq KCl  - Replace K per protocol  - Mg 1.6  - Oral intake improved  - Hypernatremia resolved on 6/10  - Saline lock  - Mg 1.5 on " day of discharge, will change to oral replacement (No IV access) and recommend recheck Mg in 1 week.   -K is 3.1 on discharge.  Discharging on KCl 20meq twice daily - BMP to be checked in 1 week.      Acute kidney injury   Admit creatinine 1.32 (baseline 0.7 - 1.0), GFR 48 (baseline 65 - 82). Appears dehydrated, likely prerenal / related to sepsis. Given 1.75L NS in the emergency department.  - Additional 1L LR bolus with LR @ 125 ml/hr overnight  - BMP in am  - Cr improved, change IVF to 0.45NS w/ 20 mEq KCl  - Resolved, Cr now 0.98 on 6/9     Leukocytosis   Afebrile. WBC 12.0 with left shift (ANC 10.4). Due to UTI.   - Treat UTI above  - WBC 6.3 on 6/9     Elevated lactic acid - resolved  Admit lactic acid 2.3, resolved after 1.75 L IV fluids. Due to sepsis.  - No further rechecks     COVID PUI - negative  Assisted living facility was concerned for COVID. Clinically not consistent with COVID, low suspicion.  - COVID negative 6/7/20  - No precautions or re-test needed     Diabetes mellitus, type 2  A1c 7.5. Managed prior to admission with Lantus 10U q am, metformin 500 mg bid.  - Continue Lantus  - Hold metformin due to renal function  - High sliding scale insulin  - Hyper/hypoglycemia protocol  - Uncontrolled hyperglycemia since admission, since additional 3 unit(s) lantus now  - Increase Lantus to 14 unit(s) daily on 6/8  - Hyperglycemia improved on 6/9     ASCVD (arteriosclerotic cardiovascular disease)  Hyperlipidemia  Hypertension  History of PCI in 2001. Managed prior to admission with lisinopril 10 mg q am. Not on any other cardiac medications prior to admission.   - Hold lisinopril on admission due to renal function  - BP elevated on 6/9, resume lisinopril     Rheumatoid arthritis   Immunosuppressed status   Managed prior to admission with Humira 40 mg subcutaneous every 14 days, Plaquenil 200 mg daily, methotrexate 10 mg every Wednesday, and prednisone 7.5 mg daily.   - Hold Humira and methotrexate  -  Continue Plaquenil and prednisone  - No indication for stress dose steroids currently     Weight loss / cachectica - Severe protein calorie malnutrition  Appears severely cachetic.   - Nutrition consult  - recommending oral nutritional supplement like Boost 2-3x/day between meals.    Clinical Findings - Dietary Consult 06/08/2020:     RECOMMENDATIONS FOR MD/PROVIDER TO ORDER:   ADAT   Recommendations Ordered by Registered Dietitian (RD):   Boost Breeze TID with meals   Future/Additional Recommendations:   As diet advances, consider changing ONS to Boost Plus or Boost Very High Calorie pending oral intake.   Daily weights as ordered   Continue to monitor and encourage oral inake   RD to monitor for WOC RN note to determine additional nutrition interventions   Malnutrition: Severe malnutrition   Obtained from Chart/Interdisciplinary Team:   Appears severely cachectic; severely diminished muscle bulk and tone per H&P      DVT Prophylaxis: Pneumatic Compression Devices  Code Status: DNR/DNI    Disposition: Expected discharge tomorrow.    Aung Rod      Consultations This Hospital Stay   NUTRITION SERVICES ADULT IP CONSULT  PHYSICAL THERAPY ADULT IP CONSULT  SOCIAL WORK IP CONSULT  NUTRITION SERVICES ADULT IP CONSULT  WOUND OSTOMY CONTINENCE NURSE  IP CONSULT  PHYSICAL THERAPY ADULT IP CONSULT  PHYSICAL THERAPY ADULT IP CONSULT    Code Status   DNR/DNI    Time Spent on this Encounter   I, Alejandra Echeverria MD, personally saw the patient today and spent greater than 30 minutes discharging this patient.       Alejandra Echeverria MD  [facility]  ______________________________________________________________________    Physical Exam   Vital Signs: Temp: 97.8  F (36.6  C) Temp src: Oral BP: 128/60 Pulse: 80 Heart Rate: 80 Resp: 18 SpO2: 96 % O2 Device: None (Room air)    Weight: 139 lbs 15.87 oz  Constitutional: wakes to voice, pleasant, answers  Yes/no questions.    ENT: normocepalic, without  obvious abnormality  Respiratory: clear to auscultation bilaterally  Cardiovascular: Normal apical impulse, regular rate and rhythm, normal S1 and S2, no S3 or S4, and no murmur noted  Skin: no rashes  Neuropsychiatric: General: calm  Level of consciousness: drowsy  Orientation: oriented only to self       Primary Care Physician   [facility]    Discharge Orders      Reason for your hospital stay    This is an 86 year old male admitted with a UTI.     Activity    Your activity upon discharge: activity as tolerated     Monitor and record    blood glucose 4 times a day, before meals and at bedtime     Tubes and drains    You are going home with the following tubes or drains: martinez catheter.  Tube cares per hospital or home care instructions     Follow-up and recommended labs and tests    Follow up with primary care provider, [facility], within 7 days for hospital follow- up.  The following labs/tests are recommended: BMP and CBC and magnesium.     DNR/DNI     Diet    Follow this diet upon discharge: Orders Placed This Encounter      Snacks/Supplements Adult: Boost Glucose Control; With Meals      Consistent Carbohydrate Diet 2767-4489 Calories: Moderate Consistent CHO (4-6 CHO units/meal)       Significant Results and Procedures   Most Recent 3 CBC's:  Recent Labs   Lab Test 06/09/20  0437 06/08/20  0451 06/07/20  1704   WBC 6.3 9.5 12.0*   HGB 11.6* 11.2* 12.9*   MCV 96 95 95    169 180     Most Recent 3 BMP's:  Recent Labs   Lab Test 06/11/20  0424 06/10/20  1724 06/10/20  0418 06/09/20  0437     --  144 153*   POTASSIUM 3.1* 3.8 3.3* 3.6   CHLORIDE 109  --  113* 120*   CO2 27  --  27 29   BUN 20  --  23 26   CR 0.82  --  0.94 0.98   ANIONGAP 7  --  4 4   LAISHA 7.9*  --  8.2* 8.9   *  --  104* 118*     Most Recent 2 LFT's:  Recent Labs   Lab Test 06/08/20  0451 06/07/20  1704   AST 19 25   ALT 21 24   ALKPHOS 81 91   BILITOTAL 0.4 0.5     Most Recent 3 INR's:No  lab results found.,   Results for orders placed or performed during the hospital encounter of 06/07/20   CT Chest/Abdomen/Pelvis w Contrast    Narrative    EXAM: CT CHEST/ABDOMEN/PELVIS W CONTRAST  LOCATION: [facility]  DATE/TIME: 6/7/2020 6:49 PM    INDICATION:  tender abdomen, poor historian, agitation;  COMPARISON: 02/29/2020 CT abdomen pelvis, 03/29/2019 chest CT for PE.  TECHNIQUE: CT angiogram chest during arterial phase injection IV contrast. 2D and 3D MIP reconstructions were performed by the CT technologist. Dose reduction techniques were used.     TECHNIQUE: CT scan of the abdomen and pelvis was performed following injection of IV contrast. Multiplanar reformats were obtained. Dose reduction techniques were used.    CONTRAST: 79 mL Isovue-370    CT CHEST FINDINGS:  HEART: Mild biatrial enlargement, left greater than right. Trace pericardial effusion. Severe coronary arterial calcification.    LUNGS AND PLEURA: No pulmonary mass or consolidation. Stable focal consolidation within the left upper lobe posteriorly along the major fissure, unchanged compared to 03/29/2019 chest CT. Airways are clear. No pleural effusion or pneumothorax.    MEDIASTINUM: No abnormally enlarged mediastinal or hilar lymph nodes. Small hiatal hernia suggested.    MUSCULOSKELETAL: Prior right shoulder replacement. No suspicious bone lesion.      CT ABDOMEN PELVIS FINDINGS:  HEPATOBILIARY: Normal.    PANCREAS: Normal.    SPLEEN: Normal.    ADRENAL GLANDS: Normal.    KIDNEYS/BLADDER: Mild bilateral hydronephrosis and hydroureter. Marked circumferential bladder wall thickening, urothelial enhancement, and perivesicular inflammatory change. No urinary tract calcification. No suspicious renal mass.    BOWEL: No evidence for bowel obstruction. Appendix not identified.    LYMPH NODES: Normal.    VASCULATURE: Mildly dilated infrarenal abdominal aorta maximally 33 mm.    PELVIC ORGANS: Unremarkable.    OTHER:  None.    MUSCULOSKELETAL: No suspicious bone lesion. Severe degenerative change in the lower lumbar spine.        Impression    IMPRESSION:  1.  No acute abnormality in the chest.  2.  Stable focal density in the left upper lobe unchanged compared to 03/29/2019. Depending on the clinical picture, recommend continued surveillance in 12 months.    3.  Severe cystitis suggested please correlate clinically. Mild bilateral hydronephrosis and hydroureter without obstructing stone.           Discharge Medications   Current Discharge Medication List      START taking these medications    Details   levofloxacin (LEVAQUIN) 750 MG tablet Take 1 tablet (750 mg) by mouth daily (with dinner) for 3 days  Qty: 3 tablet, Refills: 0    Associated Diagnoses: Acute cystitis without hematuria      magnesium chloride 535 (64 Mg) MG TBEC CR tablet Take 1 tablet (535 mg) by mouth daily  Qty:      Associated Diagnoses: Hypomagnesemia         CONTINUE these medications which have CHANGED    Details   acetaminophen (TYLENOL) 325 MG tablet Take 2 tablets (650 mg) by mouth every 4 hours as needed for mild pain    Associated Diagnoses: Osteoarthritis of right glenohumeral joint      insulin glargine (LANTUS PEN) 100 UNIT/ML pen Inject 10 Units Subcutaneous every morning  Qty: 3 mL, Refills: 0    Comments: If Lantus is not covered by insurance, may substitute Basaglar at same dose and frequency.    Associated Diagnoses: Type 2 diabetes mellitus with other specified complication, unspecified whether long term insulin use (H)         CONTINUE these medications which have NOT CHANGED    Details   adalimumab (HUMIRA) 40 MG/0.8ML prefilled syringe kit Inject 40 mg Subcutaneous every 14 days      ferrous sulfate (FE TABS) 325 (65 Fe) MG EC tablet Take 325 mg by mouth daily Take with water and meal      folic acid (FOLVITE) 1 MG tablet Take 1 mg by mouth daily      glipiZIDE (GLUCOTROL) 5 MG tablet Take by mouth daily Every morning       hydroxychloroquine (PLAQUENIL) 200 MG tablet Take 1 tablet (200 mg) by mouth every morning    Comments: Hold this medicine until 4/5 and then restart  Associated Diagnoses: Rheumatoid arthritis, involving unspecified site, unspecified rheumatoid factor presence (H)      lisinopril (PRINIVIL/ZESTRIL) 20 MG tablet Take 10 mg by mouth every morning       melatonin 3 MG tablet Take 3 mg by mouth daily      menthol-zinc oxide (CALMOSEPTINE) 0.44-20.6 % OINT ointment Apply topically 3 times daily And as needed      metFORMIN (GLUCOPHAGE) 500 MG tablet Take 500 mg by mouth 2 times daily (with meals)       methotrexate 2.5 MG tablet Take 10 mg by mouth once a week On Wednesday      mirtazapine (REMERON) 15 MG tablet Take 7.5 mg by mouth At Bedtime       multivitamin (I-DIANE) TABS per tablet Take 1 tablet by mouth daily preservision cap      nitroFURantoin macrocrystal (MACRODANTIN) 100 MG capsule Take 100 mg by mouth daily       nystatin (NYSTOP) 031115 UNIT/GM external powder Apply topically 2 times daily Until healed , also bid prn      predniSONE (DELTASONE) 5 MG tablet Take 7.5 mg by mouth daily (with breakfast) .      tamsulosin (FLOMAX) 0.4 MG capsule Take 0.4 mg by mouth daily      triamcinolone (KENALOG) 0.1 % external cream Apply topically 2 times daily as needed for irritation      Artificial Saliva (BIOTENE MOISTURIZING MOUTH MT) Take 1 spray by mouth 2 times daily         STOP taking these medications       cefuroxime (CEFTIN) 250 MG tablet Comments:   Reason for Stopping:             Allergies   Allergies   Allergen Reactions     Finasteride Other (See Comments)     Gynecomastia, Required tamoxifen treatment     Penicillins Other (See Comments) and Swelling     Erythema     Gold Rash     Injectable gold

## 2020-06-12 LAB — GLUCOSE BLDC GLUCOMTR-MCNC: 108 MG/DL (ref 70–99)

## 2020-06-13 LAB
BACTERIA SPEC CULT: NO GROWTH
BACTERIA SPEC CULT: NO GROWTH
SPECIMEN SOURCE: NORMAL
SPECIMEN SOURCE: NORMAL

## 2020-06-19 ENCOUNTER — AMBULATORY - HEALTHEAST (OUTPATIENT)
Dept: OTHER | Facility: CLINIC | Age: 85
End: 2020-06-19

## 2020-06-19 ENCOUNTER — DOCUMENTATION ONLY (OUTPATIENT)
Dept: OTHER | Facility: CLINIC | Age: 85
End: 2020-06-19

## 2020-12-29 NOTE — ED NOTES
Bed: ED09  Expected date:   Expected time:   Means of arrival: Ambulance  Comments:  fever   Additional Notes: Samples of Cerave foaming wash and hydrating wash given to patient today.\\nAcne care plan provided. Detail Level: Simple

## 2023-09-30 NOTE — PLAN OF CARE
Patient alert, but confused and disorientated. Patient setting bed alarm off overnight by sitting up on the edge of the bed. Patient easily redirected and was pleasant/cooperative. Vital signs stable. On room air and afebrile. Denied any pain. Patient able to sleep for portions of the night.       Murcia catheter secure and intact. Scheduled IV antibiotics given.     BG checks completed. IV fluids running.    Yes

## 2024-04-12 NOTE — TELEPHONE ENCOUNTER
Dtr in law [facility] calling, states [facility] does not have order for new abx ordered 7/7/19.  Did fax to [phone], FIDENCIO [place]ura of incoming fax.      Mercedes Grider RN    [facility] [employer] RN  Lung Nodule and ED Lab Results F/U RN  Epic pool (ED late result f/u RN) : P [id number]   # [phone]     Your testing was negative for covid, flu, RSV.  Please use over the counter medications for your symptoms as recommended at your visit.  Salma Martel MD    Patient notified via Purple